# Patient Record
Sex: FEMALE | Race: WHITE | NOT HISPANIC OR LATINO | Employment: OTHER | URBAN - METROPOLITAN AREA
[De-identification: names, ages, dates, MRNs, and addresses within clinical notes are randomized per-mention and may not be internally consistent; named-entity substitution may affect disease eponyms.]

---

## 2017-05-02 ENCOUNTER — ALLSCRIPTS OFFICE VISIT (OUTPATIENT)
Dept: OTHER | Facility: OTHER | Age: 67
End: 2017-05-02

## 2017-05-02 RX ORDER — DILTIAZEM HYDROCHLORIDE 240 MG/1
240 CAPSULE, EXTENDED RELEASE ORAL DAILY
COMMUNITY

## 2017-05-02 RX ORDER — NABUMETONE 750 MG/1
750 TABLET, FILM COATED ORAL 2 TIMES DAILY
COMMUNITY

## 2017-05-02 RX ORDER — SIMVASTATIN 20 MG
20 TABLET ORAL
COMMUNITY

## 2017-05-02 RX ORDER — ASPIRIN 81 MG/1
81 TABLET ORAL DAILY
COMMUNITY

## 2017-05-02 RX ORDER — DONEPEZIL HYDROCHLORIDE 10 MG/1
10 TABLET, FILM COATED ORAL
COMMUNITY

## 2017-05-02 RX ORDER — CHOLECALCIFEROL (VITAMIN D3) 1250 MCG
CAPSULE ORAL
COMMUNITY

## 2017-05-02 RX ORDER — LEVOTHYROXINE SODIUM 137 UG/1
CAPSULE ORAL
COMMUNITY

## 2017-05-02 RX ORDER — DIPHENOXYLATE HYDROCHLORIDE AND ATROPINE SULFATE 2.5; .025 MG/1; MG/1
1 TABLET ORAL DAILY
COMMUNITY

## 2017-05-02 RX ORDER — DULOXETIN HYDROCHLORIDE 60 MG/1
60 CAPSULE, DELAYED RELEASE ORAL DAILY
COMMUNITY

## 2017-05-02 RX ORDER — TAMOXIFEN CITRATE 20 MG/1
20 TABLET ORAL 2 TIMES DAILY
COMMUNITY

## 2017-05-02 RX ORDER — MEMANTINE HYDROCHLORIDE 10 MG/1
10 TABLET ORAL 2 TIMES DAILY
COMMUNITY

## 2017-05-10 ENCOUNTER — ANESTHESIA EVENT (OUTPATIENT)
Dept: PERIOP | Facility: HOSPITAL | Age: 67
End: 2017-05-10
Payer: MEDICARE

## 2017-05-10 ENCOUNTER — ANESTHESIA (OUTPATIENT)
Dept: PERIOP | Facility: HOSPITAL | Age: 67
End: 2017-05-10
Payer: MEDICARE

## 2017-05-10 ENCOUNTER — HOSPITAL ENCOUNTER (OUTPATIENT)
Facility: HOSPITAL | Age: 67
Setting detail: OUTPATIENT SURGERY
Discharge: HOME | End: 2017-05-10
Attending: INTERNAL MEDICINE | Admitting: INTERNAL MEDICINE
Payer: MEDICARE

## 2017-05-10 ENCOUNTER — GENERIC CONVERSION - ENCOUNTER (OUTPATIENT)
Dept: OTHER | Facility: OTHER | Age: 67
End: 2017-05-10

## 2017-05-10 VITALS
WEIGHT: 192.02 LBS | HEART RATE: 73 BPM | OXYGEN SATURATION: 98 % | TEMPERATURE: 97.2 F | DIASTOLIC BLOOD PRESSURE: 72 MMHG | HEIGHT: 66 IN | BODY MASS INDEX: 30.86 KG/M2 | SYSTOLIC BLOOD PRESSURE: 131 MMHG | RESPIRATION RATE: 16 BRPM

## 2017-05-10 DIAGNOSIS — R07.89 ATYPICAL CHEST PAIN: ICD-10-CM

## 2017-05-10 PROCEDURE — 88305 TISSUE EXAM BY PATHOLOGIST: CPT | Performed by: INTERNAL MEDICINE

## 2017-05-10 PROCEDURE — 88342 IMHCHEM/IMCYTCHM 1ST ANTB: CPT | Performed by: INTERNAL MEDICINE

## 2017-05-10 RX ORDER — PROPOFOL 10 MG/ML
INJECTION, EMULSION INTRAVENOUS AS NEEDED
Status: DISCONTINUED | OUTPATIENT
Start: 2017-05-10 | End: 2017-05-10 | Stop reason: SURG

## 2017-05-10 RX ORDER — SODIUM CHLORIDE, SODIUM LACTATE, POTASSIUM CHLORIDE, CALCIUM CHLORIDE 600; 310; 30; 20 MG/100ML; MG/100ML; MG/100ML; MG/100ML
125 INJECTION, SOLUTION INTRAVENOUS CONTINUOUS
Status: DISCONTINUED | OUTPATIENT
Start: 2017-05-10 | End: 2017-05-10 | Stop reason: HOSPADM

## 2017-05-10 RX ORDER — LIDOCAINE HYDROCHLORIDE 20 MG/ML
INJECTION, SOLUTION EPIDURAL; INFILTRATION; INTRACAUDAL; PERINEURAL AS NEEDED
Status: DISCONTINUED | OUTPATIENT
Start: 2017-05-10 | End: 2017-05-10 | Stop reason: SURG

## 2017-05-10 RX ADMIN — LIDOCAINE HYDROCHLORIDE 40 MG: 20 INJECTION, SOLUTION EPIDURAL; INFILTRATION; INTRACAUDAL; PERINEURAL at 12:37

## 2017-05-10 RX ADMIN — SODIUM CHLORIDE, SODIUM LACTATE, POTASSIUM CHLORIDE, AND CALCIUM CHLORIDE: .6; .31; .03; .02 INJECTION, SOLUTION INTRAVENOUS at 12:16

## 2017-05-10 RX ADMIN — PROPOFOL 80 MG: 10 INJECTION, EMULSION INTRAVENOUS at 12:37

## 2017-05-15 ENCOUNTER — GENERIC CONVERSION - ENCOUNTER (OUTPATIENT)
Dept: OTHER | Facility: OTHER | Age: 67
End: 2017-05-15

## 2018-01-13 VITALS
WEIGHT: 188.13 LBS | SYSTOLIC BLOOD PRESSURE: 140 MMHG | DIASTOLIC BLOOD PRESSURE: 80 MMHG | HEIGHT: 66 IN | BODY MASS INDEX: 30.23 KG/M2 | HEART RATE: 76 BPM

## 2018-01-18 NOTE — RESULT NOTES
Verified Results  (1) TISSUE EXAM [id number] 12:40PM Abel Para     Test Name Result Flag Reference   LAB AP CASE REPORT (Report)     Surgical Pathology Report             Case: [id number]                   Authorizing Provider: Bryanna Quach MD  Collected:      05/10/2017 1240        Ordering Location:   [facility] Received:      05/10/2017 1314                    Operating Room                                 Pathologist:      Avery Medeiros MD                                Specimens:  A) - Duodenum, Duodenum, r/o celiac                                  B) - Stomach, Stomach, r/o H pylori                                  C) - Esophagus, Esophagus, r/o Carey's   LAB AP FINAL DIAGNOSIS (Report)     A  Duodenum, Duodenum, r/o celiac biopsy:   -Benign small intestinal mucosa with intact villi and no histopathological   changes   -No evidence of dysplasia or malignancy  B  Stomach, Stomach, r/o H pylori biopsy:  -Benign gastric- type mucosa with mild chronic inactive gastritis   -No evidence of Paneth cell metaplasia or atrophic gastritis   -No evidence of dysplasia or malignancy   -No H Pylori organisms identified on immunohistochemical stained slide  Control reacted appropriately  C  Esophagus, Esophagus, r/o Carey's biopsy:  -Benign gastric-cardiac type mucosa with mild chronic inactive gastritis   -No evidence of Goblet cell metaplasia/ Carey???s esophagus identified   -Squamous epithelium is not identified   -No evidence of dysplasia or malignancy seen  Electronically signed by Avery Medeiros MD on 5/12/2017 at 6:01 PM   LAB AP NOTE      Interpretation performed at [facility], [facility]   LAB AP SURGICAL ADDITIONAL INFORMATION (Report)     These tests were developed and their performance characteristics   determined by [facility] or [facility]  They may not be cleared or approved by the [place]   Food and   Drug Administration  The FDA has determined that such clearance or   approval is not necessary  These tests are used for clinical purposes  They should not be regarded as investigational or for research  This   laboratory has been approved by CLIA 88, designated as a high-complexity   laboratory and is qualified to perform these tests  LAB AP GROSS DESCRIPTION (Report)     A  The specimen is received in formalin, labeled with the patient's   name and hospital number, and is designated duodenum  The specimen   consists of two, rubbery, tan-brown tissue fragments each measuring up to   0 2 cm in greatest dimension  Entirely submitted  One cassette  B  The specimen is received in formalin, labeled with the patient's name   and hospital number, and is designated  stomach consists of 3 rubbery   tan-brown tissue fragments measuring from 0 2 up to 0 3 cm in greatest   dimension  Entirely submitted  One cassette  C  The specimen is received in formalin, labeled with the patient's name   and hospital number, and is designated esophagus  The specimen consists   of a single, rubbery, tan-brown tissue fragment measuring up to 0 2 cm in   greatest dimension  Entirely submitted  One cassette  Note: The estimated total formalin fixation time based upon information   provided by the submitting clinician and the standard processing schedule   is 25 25 hours      SRS   LAB AP CLINICAL INFORMATION Cold bx   R/o celiac     Cold bx   R/o celiac

## 2018-01-29 ENCOUNTER — APPOINTMENT (EMERGENCY)
Dept: RADIOLOGY | Facility: HOSPITAL | Age: 68
End: 2018-01-29
Payer: MEDICARE

## 2018-01-29 ENCOUNTER — HOSPITAL ENCOUNTER (EMERGENCY)
Facility: HOSPITAL | Age: 68
Discharge: HOME | End: 2018-01-29
Attending: EMERGENCY MEDICINE | Admitting: EMERGENCY MEDICINE
Payer: MEDICARE

## 2018-01-29 VITALS
DIASTOLIC BLOOD PRESSURE: 79 MMHG | HEIGHT: 66 IN | HEART RATE: 67 BPM | OXYGEN SATURATION: 99 % | RESPIRATION RATE: 18 BRPM | SYSTOLIC BLOOD PRESSURE: 165 MMHG | TEMPERATURE: 98.1 F

## 2018-01-29 DIAGNOSIS — S76.012A STRAIN OF FLEXOR MUSCLE OF HIP, LEFT, INITIAL ENCOUNTER: ICD-10-CM

## 2018-01-29 DIAGNOSIS — S76.212A STRAIN OF GROIN, LEFT, INITIAL ENCOUNTER: Primary | ICD-10-CM

## 2018-01-29 PROCEDURE — 73502 X-RAY EXAM HIP UNI 2-3 VIEWS: CPT

## 2018-01-29 PROCEDURE — 99283 EMERGENCY DEPT VISIT LOW MDM: CPT

## 2018-01-29 RX ORDER — CYCLOBENZAPRINE HCL 10 MG
10 TABLET ORAL 2 TIMES DAILY PRN
Qty: 20 TABLET | Refills: 0 | Status: SHIPPED | OUTPATIENT
Start: 2018-01-29 | End: 2021-01-20 | Stop reason: HOSPADM

## 2018-01-29 RX ORDER — CYCLOBENZAPRINE HCL 10 MG
10 TABLET ORAL ONCE
Status: COMPLETED | OUTPATIENT
Start: 2018-01-29 | End: 2018-01-29

## 2018-01-29 RX ORDER — IBUPROFEN 400 MG/1
400 TABLET ORAL ONCE
Status: COMPLETED | OUTPATIENT
Start: 2018-01-29 | End: 2018-01-29

## 2018-01-29 RX ADMIN — IBUPROFEN 400 MG: 400 TABLET, FILM COATED ORAL at 15:15

## 2018-01-29 RX ADMIN — CYCLOBENZAPRINE HYDROCHLORIDE 10 MG: 10 TABLET, FILM COATED ORAL at 15:15

## 2018-01-29 NOTE — DISCHARGE INSTRUCTIONS
Groin Strain   WHAT YOU NEED TO KNOW:   A groin strain occurs when a muscle or tendon is stretched or torn  The groin is the area where your abdomen meets your upper leg  Tendons are cords of tissue that attach muscle to bone  DISCHARGE INSTRUCTIONS:   Rest your groin area: You will need to rest your groin area from activities that may cause you pain  This will help decrease the risk of more damage to your groin  Use crutches or a cane as directed  Ice your groin area: Ice your groin area to help decrease swelling and pain  Put crushed ice in a plastic bag and cover it with a towel  Put the ice on your groin area for 15 to 20 minutes every hour  Do this for as many days as directed  Wrap your groin:  Your healthcare provider will instruct you on how to wrap your groin with an elastic bandage or tape  When you wrap your groin, it becomes more stable  Wrapping your groin can help decrease your pain  Elevate the injured area:  Keep the leg on your injured side raised to help decrease pain and swelling in your groin area  Use pillows, blankets, or rolled towels to elevate your leg as often as you can  Medicine:   · Pain medicine: You may be given medicine such as aspirin or ibuprofen to decrease or take away pain  Do not wait until the pain is severe before you take your medicine  · Take your medicine as directed:  Call your healthcare provider if you think your medicine is not helping or if you have side effects  Tell him if you take any vitamins, herbs, or other medicines  Keep a list of the medicines you take  Include the amounts, and when and why you take them  Bring the list or the pill bottles to follow-up visits  Activity:  You may need to exercise the injured area after your pain and swelling have decreased  Exercises will help prevent stiffness in the injured area and increase strength  Return to your normal activities slowly   You could injure yourself again if you try to return to normal activity too soon  Return to your normal level of activity when:  · You do not have pain when you walk, run, or jump  · Your injured leg moves like your uninjured leg  · Your injured leg feels as strong as your uninjured leg  Prevent another injury:   · Warm up and stretch before you exercise  · Wear shoes that fit well  · Wear equipment to protect yourself when you play sports  · Do exercises as directed to build muscle strength  Stop if you feel pain or tightness in your groin  Follow up with your healthcare provider as directed:  Write down any questions you have so you remember to ask them in your follow-up visits  Contact your healthcare provider if:   · You have increased swelling and pain in your injured area  · You have increased groin pain when standing or with movement  · You have questions or concerns about your injury or treatment  © 2017 [address]  Information is for End User's use only and may not be sold, redistributed or otherwise used for commercial purposes  All illustrations and images included in CareNotes® are the copyrighted property of A [employer] M [employer]  or [employer]ft[employer] Pick[employer]  The above information is an  only  It is not intended as medical advice for individual conditions or treatments  Talk to your doctor, nurse or pharmacist before following any medical regimen to see if it is safe and effective for you  Hip Sprain, Ambulatory Care   GENERAL INFORMATION:   A hip sprain  is when a ligament in your hip is stretched or torn  Ligaments (tough tissue that connects bones) surround your hip and hold it in place    Seek immediate care for the following symptoms:   · Trouble standing on the leg on your injured side    · New or increased stiffness or trouble moving your injured hip    · New or increased numbness in the leg on your injured side    · Increased swelling and pain in your hip    · Bluish or pale skin on the leg on your injured side  Treatment for a hip sprain  may include any of the following:  · NSAIDs  help decrease swelling and pain or fever  This medicine is available with or without a doctor's order  NSAIDs can cause stomach bleeding or kidney problems in certain people  If you take blood thinner medicine, always ask your healthcare provider if NSAIDs are safe for you  Always read the medicine label and follow directions  · Hip exercises  help decrease stiffness  Your healthcare provider may want you to start hip exercises after you rest your hip for about 3 days  He may also have you start physical therapy  A physical therapist teaches you light exercises to help decrease pain and swelling and improve hip movement  Once you are able to move your hip without pain, you will be taught exercises to improve your strength  If you have a severe sprain, you may need to wait 1 to 3 weeks before you exercise your hip  Manage your hip sprain:   · Rest your hip for 2 to 3 days after your injury  This will help decrease the risk of more damage to your hip  · Apply ice on your hip for 15 to 20 minutes every hour or as directed  Use an ice pack, or put crushed ice in a plastic bag  Cover it with a towel  Ice helps prevent tissue damage and decreases swelling and pain  You may need to apply ice to your hip at least 4 to 8 times each day  Prevent another hip sprain:   · Do not exercise when you feel pain or you are tired  · Exercise daily  Make sure you warm up and stretch before you exercise  · Wear equipment to protect yourself when you play sports  · Wear shoes that fit well to decrease your risk for falls  · Stop exercising and playing sports if your symptoms from a past injury return  Follow up with your healthcare provider as directed:  Write down your questions so you remember to ask them during your visits  CARE AGREEMENT:   You have the right to help plan your care   Learn about your health condition and how it may be treated  Discuss treatment options with your caregivers to decide what care you want to receive  You always have the right to refuse treatment  The above information is an  only  It is not intended as medical advice for individual conditions or treatments  Talk to your doctor, nurse or pharmacist before following any medical regimen to see if it is safe and effective for you  © 2014 [address] is for End User's use only and may not be sold, redistributed or otherwise used for commercial purposes  All illustrations and images included in CareNotes® are the copyrighted property of A D A M , [employer]  or [employer]ftali Pickens

## 2018-01-29 NOTE — ED PROVIDER NOTES
History  Chief Complaint   Patient presents with    Hip Pain     Pt presents to ED due to left hip pain after twisting LLE while making the bed this AM  Hx of hip pain  +2 pedal pulse  History provided by:  Patient   used: No    Hip Pain   Associated symptoms: no rash and no shortness of breath     41-year-old female presented for evaluation of left hip and groin pain which came on this morning after she had twisted her body and leg while making the bed  She had previously injured the same area about a month ago and was still in the process of improving when she re-injured herself today  Took Tylenol prior to arrival with some improvement  Tenderness of the left low back/hip as well as the left groin  Pain exacerbated with resistance against flexion of the hip  Normal strength, sensation  No difficulty urinating or with bowel movements  Plan conservative treatment for groin strain/hip strain  Given info for exercises  Follow up with PCP if symptoms are not improving  Prior to Admission Medications   Prescriptions Last Dose Informant Patient Reported? Taking?    Cholecalciferol (VITAMIN D3) 89421 units CAPS   Yes No   Sig: Take by mouth   DULoxetine (CYMBALTA) 60 mg delayed release capsule   Yes No   Sig: Take 60 mg by mouth daily   Levothyroxine Sodium 137 MCG CAPS   Yes No   Sig: Take by mouth   aspirin (ECOTRIN LOW STRENGTH) 81 mg EC tablet   Yes No   Sig: Take 81 mg by mouth daily   diltiazem (DILACOR XR) 240 MG 24 hr capsule   Yes No   Sig: Take 240 mg by mouth daily   donepezil (ARICEPT) 10 mg tablet   Yes No   Sig: Take 10 mg by mouth daily at bedtime   losartan 50 mg TABS 100 mg, hydrochlorothiazide 12 5 mg TABS 12 5 mg   Yes No   Sig: Take by mouth daily   memantine (NAMENDA) 10 mg tablet   Yes No   Sig: Take 10 mg by mouth 2 (two) times a day   multivitamin (THERAGRAN) TABS   Yes No   Sig: Take 1 tablet by mouth daily   nabumetone (RELAFEN) 750 mg tablet   Yes No Sig: Take 750 mg by mouth 2 (two) times a day   simvastatin (ZOCOR) 20 mg tablet   Yes No   Sig: Take 20 mg by mouth daily at bedtime   tamoxifen (NOLVADEX) 20 mg tablet   Yes No   Sig: Take 20 mg by mouth 2 (two) times a day      Facility-Administered Medications: None       Past Medical History:   Diagnosis Date    GERD (gastroesophageal reflux disease)     Hyperlipidemia     Hypertension     Hypothyroidism     Memory difficulties     Shortness of breath        Past Surgical History:   Procedure Laterality Date    APPENDECTOMY      BREAST SURGERY       SECTION      HYSTERECTOMY      [place] ESOPHAGOGASTRODUODENOSCOPY TRANSORAL DIAGNOSTIC N/A 5/10/2017    Procedure: ESOPHAGOGASTRODUODENOSCOPY (EGD); Surgeon: Jany Buck MD;  Location: [facility]; Service: Gastroenterology    TONSILLECTOMY         History reviewed  No pertinent family history  I have reviewed and agree with the history as documented  Social History   Substance Use Topics    Smoking status: Never Smoker    Smokeless tobacco: Never Used    Alcohol use No        Review of Systems   Constitutional: Negative for activity change and appetite change  Respiratory: Negative for chest tightness and shortness of breath  Musculoskeletal: Positive for gait problem  Negative for back pain and neck pain  Skin: Negative for color change and rash  Neurological: Negative for dizziness and weakness  All other systems reviewed and are negative        Physical Exam  ED Triage Vitals [18 1306]   Temperature Pulse Respirations Blood Pressure SpO2   98 1 °F (36 7 °C) 67 18 165/79 99 %      Temp Source Heart Rate Source Patient Position - Orthostatic VS BP Location FiO2 (%)   Oral Monitor Sitting Left arm --      Pain Score       9           Orthostatic Vital Signs  Vitals:    18 1306   BP: 165/79   Pulse: 67   Patient Position - Orthostatic VS: Sitting       Physical Exam   Constitutional: She appears well-developed and well-nourished  No distress  HENT:   Head: Normocephalic and atraumatic  Cardiovascular: Normal rate, regular rhythm and intact distal pulses  Musculoskeletal: Normal range of motion  Pain with active flexion of the left hip as well as some rotation  Tenderness along the inguinal region and around the left hip to the sacroiliac region  Neurological: She exhibits normal muscle tone  Coordination normal    Normal strength, sensation  Skin: Skin is warm and dry  Psychiatric: She has a normal mood and affect  Her behavior is normal    Nursing note and vitals reviewed  ED Medications  Medications   ibuprofen (MOTRIN) tablet 400 mg (not administered)   cyclobenzaprine (FLEXERIL) tablet 10 mg (not administered)       Diagnostic Studies  Results Reviewed     None                 XR hip/pelv 2-3 vws left if performed   Final Result by Ada Velasco MD (01/29 1343)      No acute osseous abnormality  Workstation performed: [id number]                    Procedures  Procedures       Phone Contacts  ED Phone Contact    ED Course  ED Course                                MDM  Number of Diagnoses or Management Options  Strain of flexor muscle of hip, left, initial encounter:   Strain of groin, left, initial encounter:   Diagnosis management comments: 79-year-old female with the left hip/groin strain after twisting while making her bed this morning  X-ray done prior to my evaluation was unremarkable  Given ibuprofen, Flexeril in the emergency department  Can continue symptomatic care at home followed by physical therapy/exercises as tolerated         Amount and/or Complexity of Data Reviewed  Tests in the radiology section of CPT®: reviewed      CritCare Time    Disposition  Final diagnoses:   Strain of groin, left, initial encounter   Strain of flexor muscle of hip, left, initial encounter     Time reflects when diagnosis was documented in both MDM as applicable and the Disposition within this note     Time User Action Codes Description Comment    1/29/2018  2:47 PM Renetta November Add [[id number] [id number]] Strain of groin, left, initial encounter     1/29/2018  2:47 [facility]rain of flexor muscle of hip, left, initial encounter       ED Disposition     ED Disposition Condition Comment    Discharge  Codi Bello discharge to home/self care  Condition at discharge: Stable        Follow-up Information     Follow up With Specialties Details Why Contact Info    Ifeanyi Peng MD Internal Medicine   [address] [place] [address]  [phone]          Patient's Medications   Discharge Prescriptions    CYCLOBENZAPRINE (FLEXERIL) 10 MG TABLET    Take 1 tablet (10 mg total) by mouth 2 (two) times a day as needed for muscle spasms (Don't take with tramadol)       Start Date: 1/29/2018 End Date: --       Order Dose: 10 mg       Quantity: 20 tablet    Refills: 0     No discharge procedures on file      ED Provider  Electronically Signed by           Margarita Gonzalez MD  01/29/18 6896

## 2018-09-15 ENCOUNTER — APPOINTMENT (OUTPATIENT)
Dept: RADIOLOGY | Facility: MEDICAL CENTER | Age: 68
End: 2018-09-15
Payer: MEDICARE

## 2018-09-15 ENCOUNTER — OFFICE VISIT (OUTPATIENT)
Dept: URGENT CARE | Facility: MEDICAL CENTER | Age: 68
End: 2018-09-15
Payer: MEDICARE

## 2018-09-15 VITALS
OXYGEN SATURATION: 98 % | RESPIRATION RATE: 18 BRPM | HEART RATE: 66 BPM | TEMPERATURE: 99 F | HEIGHT: 66 IN | BODY MASS INDEX: 28.12 KG/M2 | DIASTOLIC BLOOD PRESSURE: 70 MMHG | WEIGHT: 175 LBS | SYSTOLIC BLOOD PRESSURE: 136 MMHG

## 2018-09-15 DIAGNOSIS — M79.671 RIGHT FOOT PAIN: Primary | ICD-10-CM

## 2018-09-15 DIAGNOSIS — M79.671 RIGHT FOOT PAIN: ICD-10-CM

## 2018-09-15 PROCEDURE — G0463 HOSPITAL OUTPT CLINIC VISIT: HCPCS | Performed by: PHYSICIAN ASSISTANT

## 2018-09-15 PROCEDURE — 99213 OFFICE O/P EST LOW 20 MIN: CPT | Performed by: PHYSICIAN ASSISTANT

## 2018-09-15 PROCEDURE — 73630 X-RAY EXAM OF FOOT: CPT

## 2018-09-15 RX ORDER — AMOXICILLIN AND CLAVULANATE POTASSIUM 875; 125 MG/1; MG/1
1 TABLET, FILM COATED ORAL EVERY 12 HOURS SCHEDULED
COMMUNITY
End: 2021-01-20 | Stop reason: HOSPADM

## 2018-09-15 NOTE — PATIENT INSTRUCTIONS
Ace bandage as directed  Ice as directed  Take motrin as directed  Follow up with PCP in 1-2 days  Go to the ER for worsening symptoms

## 2018-09-15 NOTE — PROGRESS NOTES
[facility]        NAME: Johnathon Gomes is a 70 y o  female  : 1950    MRN: [id number]      Assessment and Plan   Right foot pain [M79 671]  1  Right foot pain  XR foot 3+ vw right         Patient Instructions     Patient Instructions   Ace bandage as directed  Ice as directed  Follow up with PCP in 1-2 days  Go to the ER for worsening symptoms  Chief Complaint     Chief Complaint   Patient presents with    Foot Pain         History of Present Illness       This is a 58-year-old female complaining of right foot pain x2 days  Patient denies any injury, trauma, accident or specific event when for injuring her foot  Patient reports pain with ambulation  Patient reports she is unable to bear weight  Patient use a walker to get out to the car today  Patient denies this ever happening in the past   Patient denies history of gout  Patient denies any fever chills, nausea vomiting, diarrhea, constipation  Patient reports swelling and mild redness to the dorsal aspect of foot  Pain is located over the 3rd 4th and 5th metatarsals  Review of Systems   Review of Systems   Constitutional: Negative for chills and fever  HENT: Negative  Eyes: Negative  Respiratory: Negative for chest tightness, shortness of breath and wheezing  Cardiovascular: Negative for chest pain and palpitations  Musculoskeletal: Positive for arthralgias  Skin: Negative for rash           Current Medications       Current Outpatient Prescriptions:     amoxicillin-clavulanate (AUGMENTIN) 875-125 mg per tablet, Take 1 tablet by mouth every 12 (twelve) hours, Disp: , Rfl:     aspirin (ECOTRIN LOW STRENGTH) 81 mg EC tablet, Take 81 mg by mouth daily, Disp: , Rfl:     diltiazem (DILACOR XR) 240 MG 24 hr capsule, Take 240 mg by mouth daily, Disp: , Rfl:     donepezil (ARICEPT) 10 mg tablet, Take 10 mg by mouth daily at bedtime, Disp: , Rfl:     Levothyroxine Sodium 137 MCG CAPS, Take by mouth, Disp: , Rfl:     losartan 50 mg TABS 100 mg, hydrochlorothiazide 12 5 mg TABS 12 5 mg, Take by mouth daily, Disp: , Rfl:     memantine (NAMENDA) 10 mg tablet, Take 10 mg by mouth 2 (two) times a day, Disp: , Rfl:     multivitamin (THERAGRAN) TABS, Take 1 tablet by mouth daily, Disp: , Rfl:     nabumetone (RELAFEN) 750 mg tablet, Take 750 mg by mouth 2 (two) times a day, Disp: , Rfl:     simvastatin (ZOCOR) 20 mg tablet, Take 20 mg by mouth daily at bedtime, Disp: , Rfl:     tamoxifen (NOLVADEX) 20 mg tablet, Take 20 mg by mouth 2 (two) times a day, Disp: , Rfl:     Cholecalciferol (VITAMIN D3) 28255 units CAPS, Take by mouth, Disp: , Rfl:     cyclobenzaprine (FLEXERIL) 10 mg tablet, Take 1 tablet (10 mg total) by mouth 2 (two) times a day as needed for muscle spasms (Don't take with tramadol) (Patient not taking: Reported on 9/15/2018 ), Disp: 20 tablet, Rfl: 0    DULoxetine (CYMBALTA) 60 mg delayed release capsule, Take 60 mg by mouth daily, Disp: , Rfl:     Current Allergies     Allergies as of 09/15/2018 - Reviewed 09/15/2018   Allergen Reaction Noted    Erythromycin GI Intolerance 2017            The following portions of the patient's history were reviewed and updated as appropriate: allergies, current medications, past family history, past medical history, past social history, past surgical history and problem list      Past Medical History:   Diagnosis Date    GERD (gastroesophageal reflux disease)     Hyperlipidemia     Hypertension     Hypothyroidism     Memory difficulties     Shortness of breath        Past Surgical History:   Procedure Laterality Date    APPENDECTOMY      BREAST SURGERY       SECTION      HYSTERECTOMY      [place] ESOPHAGOGASTRODUODENOSCOPY TRANSORAL DIAGNOSTIC N/A 5/10/2017    Procedure: ESOPHAGOGASTRODUODENOSCOPY (EGD); Surgeon: Amber Parr MD;  Location: [facility];   Service: Gastroenterology    TONSILLECTOMY         No family history on file       Medications have been verified  Objective   /70   Pulse 66   Temp 99 °F (37 2 °C) (Temporal)   Resp 18   Ht 5' 6" (1 676 m)   Wt 79 4 kg (175 lb)   SpO2 98%   BMI 28 25 kg/m²     X-RAY  Right foot  I personally reviewed X-ray  No acute osseous abnormalities  Physical Exam     Physical Exam   Constitutional: She is oriented to person, place, and time  She appears well-developed and well-nourished  No distress  Cardiovascular: Normal rate, regular rhythm and normal heart sounds  Pulmonary/Chest: Effort normal and breath sounds normal  No respiratory distress  Musculoskeletal: She exhibits tenderness  Feet:    Neurological: She is alert and oriented to person, place, and time  Skin: No rash noted  Nursing note and vitals reviewed

## 2018-09-15 NOTE — PROGRESS NOTES
Pt  Began with foot pain about two days ago  Denies trauma to the area  Her right foot is swollen and painful  The top her foot is red and warm

## 2019-09-23 ENCOUNTER — TRANSCRIBE ORDERS (OUTPATIENT)
Dept: ADMINISTRATIVE | Facility: HOSPITAL | Age: 69
End: 2019-09-23

## 2019-09-23 DIAGNOSIS — F02.80 ALZHEIMER'S DEMENTIA WITHOUT BEHAVIORAL DISTURBANCE, UNSPECIFIED TIMING OF DEMENTIA ONSET (HCC): Primary | ICD-10-CM

## 2019-09-23 DIAGNOSIS — G30.9 ALZHEIMER'S DEMENTIA WITHOUT BEHAVIORAL DISTURBANCE, UNSPECIFIED TIMING OF DEMENTIA ONSET (HCC): Primary | ICD-10-CM

## 2019-09-27 ENCOUNTER — TRANSCRIBE ORDERS (OUTPATIENT)
Dept: ADMINISTRATIVE | Facility: HOSPITAL | Age: 69
End: 2019-09-27

## 2019-09-27 ENCOUNTER — APPOINTMENT (OUTPATIENT)
Dept: RADIOLOGY | Facility: MEDICAL CENTER | Age: 69
End: 2019-09-27
Payer: MEDICARE

## 2019-09-27 DIAGNOSIS — R05.9 COUGH: ICD-10-CM

## 2019-09-27 DIAGNOSIS — R05.9 COUGH: Primary | ICD-10-CM

## 2019-09-27 PROCEDURE — 71046 X-RAY EXAM CHEST 2 VIEWS: CPT

## 2019-09-30 ENCOUNTER — HOSPITAL ENCOUNTER (EMERGENCY)
Facility: HOSPITAL | Age: 69
Discharge: HOME | End: 2019-09-30
Attending: EMERGENCY MEDICINE
Payer: MEDICARE

## 2019-09-30 ENCOUNTER — APPOINTMENT (EMERGENCY)
Dept: RADIOLOGY | Facility: HOSPITAL | Age: 69
End: 2019-09-30
Payer: MEDICARE

## 2019-09-30 ENCOUNTER — APPOINTMENT (EMERGENCY)
Dept: CT IMAGING | Facility: HOSPITAL | Age: 69
End: 2019-09-30
Payer: MEDICARE

## 2019-09-30 VITALS
SYSTOLIC BLOOD PRESSURE: 147 MMHG | OXYGEN SATURATION: 94 % | TEMPERATURE: 98.1 F | HEART RATE: 88 BPM | BODY MASS INDEX: 29.82 KG/M2 | RESPIRATION RATE: 18 BRPM | WEIGHT: 184.75 LBS | DIASTOLIC BLOOD PRESSURE: 66 MMHG

## 2019-09-30 DIAGNOSIS — R79.89 LOW TSH LEVEL: ICD-10-CM

## 2019-09-30 DIAGNOSIS — R42 DIZZINESS: Primary | ICD-10-CM

## 2019-09-30 LAB
ALBUMIN SERPL BCP-MCNC: 3.2 G/DL (ref 3.5–5)
ALP SERPL-CCNC: 51 U/L (ref 46–116)
ALT SERPL W P-5'-P-CCNC: 27 U/L (ref 12–78)
ANION GAP SERPL CALCULATED.3IONS-SCNC: 8 MMOL/L (ref 4–13)
APTT PPP: 27 SECONDS (ref 23–37)
AST SERPL W P-5'-P-CCNC: 36 U/L (ref 5–45)
BACTERIA UR QL AUTO: ABNORMAL /HPF
BASOPHILS # BLD AUTO: 0.03 THOUSANDS/ΜL (ref 0–0.1)
BASOPHILS NFR BLD AUTO: 1 % (ref 0–1)
BILIRUB SERPL-MCNC: 0.9 MG/DL (ref 0.2–1)
BILIRUB UR QL STRIP: NEGATIVE
BUN SERPL-MCNC: 18 MG/DL (ref 5–25)
CALCIUM SERPL-MCNC: 8.5 MG/DL (ref 8.3–10.1)
CHLORIDE SERPL-SCNC: 105 MMOL/L (ref 100–108)
CLARITY UR: CLEAR
CO2 SERPL-SCNC: 26 MMOL/L (ref 21–32)
COLOR UR: YELLOW
CREAT SERPL-MCNC: 1.09 MG/DL (ref 0.6–1.3)
EOSINOPHIL # BLD AUTO: 0.13 THOUSAND/ΜL (ref 0–0.61)
EOSINOPHIL NFR BLD AUTO: 2 % (ref 0–6)
ERYTHROCYTE [DISTWIDTH] IN BLOOD BY AUTOMATED COUNT: 12.6 % (ref 11.6–15.1)
GFR SERPL CREATININE-BSD FRML MDRD: 52 ML/MIN/1.73SQ M
GLUCOSE SERPL-MCNC: 110 MG/DL (ref 65–140)
GLUCOSE UR STRIP-MCNC: NEGATIVE MG/DL
HCT VFR BLD AUTO: 37 % (ref 34.8–46.1)
HGB BLD-MCNC: 12.4 G/DL (ref 11.5–15.4)
HGB UR QL STRIP.AUTO: NEGATIVE
IMM GRANULOCYTES # BLD AUTO: 0.01 THOUSAND/UL (ref 0–0.2)
IMM GRANULOCYTES NFR BLD AUTO: 0 % (ref 0–2)
INR PPP: 1.1 (ref 0.84–1.19)
KETONES UR STRIP-MCNC: ABNORMAL MG/DL
LEUKOCYTE ESTERASE UR QL STRIP: ABNORMAL
LYMPHOCYTES # BLD AUTO: 2.48 THOUSANDS/ΜL (ref 0.6–4.47)
LYMPHOCYTES NFR BLD AUTO: 46 % (ref 14–44)
MAGNESIUM SERPL-MCNC: 2 MG/DL (ref 1.6–2.6)
MCH RBC QN AUTO: 33.7 PG (ref 26.8–34.3)
MCHC RBC AUTO-ENTMCNC: 33.5 G/DL (ref 31.4–37.4)
MCV RBC AUTO: 101 FL (ref 82–98)
MONOCYTES # BLD AUTO: 0.66 THOUSAND/ΜL (ref 0.17–1.22)
MONOCYTES NFR BLD AUTO: 12 % (ref 4–12)
NEUTROPHILS # BLD AUTO: 2.1 THOUSANDS/ΜL (ref 1.85–7.62)
NEUTS SEG NFR BLD AUTO: 39 % (ref 43–75)
NITRITE UR QL STRIP: NEGATIVE
NON-SQ EPI CELLS URNS QL MICRO: ABNORMAL /HPF
NRBC BLD AUTO-RTO: 0 /100 WBCS
NT-PROBNP SERPL-MCNC: 491 PG/ML
OTHER STN SPEC: ABNORMAL
PH UR STRIP.AUTO: 5.5 [PH] (ref 4.5–8)
PLATELET # BLD AUTO: 181 THOUSANDS/UL (ref 149–390)
PMV BLD AUTO: 8.9 FL (ref 8.9–12.7)
POTASSIUM SERPL-SCNC: 3.8 MMOL/L (ref 3.5–5.3)
PROT SERPL-MCNC: 6.2 G/DL (ref 6.4–8.2)
PROT UR STRIP-MCNC: NEGATIVE MG/DL
PROTHROMBIN TIME: 13.6 SECONDS (ref 11.6–14.5)
RBC # BLD AUTO: 3.68 MILLION/UL (ref 3.81–5.12)
RBC #/AREA URNS AUTO: ABNORMAL /HPF
SODIUM SERPL-SCNC: 139 MMOL/L (ref 136–145)
SP GR UR STRIP.AUTO: 1.01 (ref 1–1.03)
T4 FREE SERPL-MCNC: 2.62 NG/DL (ref 0.76–1.46)
TROPONIN I SERPL-MCNC: <0.02 NG/ML
TSH SERPL DL<=0.05 MIU/L-ACNC: <0.007 UIU/ML (ref 0.36–3.74)
UROBILINOGEN UR QL STRIP.AUTO: 0.2 E.U./DL
WBC # BLD AUTO: 5.41 THOUSAND/UL (ref 4.31–10.16)
WBC #/AREA URNS AUTO: ABNORMAL /HPF

## 2019-09-30 PROCEDURE — 85730 THROMBOPLASTIN TIME PARTIAL: CPT | Performed by: EMERGENCY MEDICINE

## 2019-09-30 PROCEDURE — 83735 ASSAY OF MAGNESIUM: CPT | Performed by: EMERGENCY MEDICINE

## 2019-09-30 PROCEDURE — 84443 ASSAY THYROID STIM HORMONE: CPT | Performed by: EMERGENCY MEDICINE

## 2019-09-30 PROCEDURE — 93005 ELECTROCARDIOGRAM TRACING: CPT

## 2019-09-30 PROCEDURE — 83880 ASSAY OF NATRIURETIC PEPTIDE: CPT | Performed by: EMERGENCY MEDICINE

## 2019-09-30 PROCEDURE — 80053 COMPREHEN METABOLIC PANEL: CPT | Performed by: EMERGENCY MEDICINE

## 2019-09-30 PROCEDURE — 71046 X-RAY EXAM CHEST 2 VIEWS: CPT

## 2019-09-30 PROCEDURE — 84439 ASSAY OF FREE THYROXINE: CPT | Performed by: EMERGENCY MEDICINE

## 2019-09-30 PROCEDURE — 36415 COLL VENOUS BLD VENIPUNCTURE: CPT

## 2019-09-30 PROCEDURE — 81001 URINALYSIS AUTO W/SCOPE: CPT

## 2019-09-30 PROCEDURE — 85610 PROTHROMBIN TIME: CPT | Performed by: EMERGENCY MEDICINE

## 2019-09-30 PROCEDURE — 99284 EMERGENCY DEPT VISIT MOD MDM: CPT | Performed by: EMERGENCY MEDICINE

## 2019-09-30 PROCEDURE — 84484 ASSAY OF TROPONIN QUANT: CPT | Performed by: EMERGENCY MEDICINE

## 2019-09-30 PROCEDURE — 99284 EMERGENCY DEPT VISIT MOD MDM: CPT

## 2019-09-30 PROCEDURE — 70450 CT HEAD/BRAIN W/O DYE: CPT

## 2019-09-30 PROCEDURE — 85025 COMPLETE CBC W/AUTO DIFF WBC: CPT | Performed by: EMERGENCY MEDICINE

## 2019-09-30 RX ORDER — MECLIZINE HCL 12.5 MG/1
25 TABLET ORAL ONCE
Status: COMPLETED | OUTPATIENT
Start: 2019-09-30 | End: 2019-09-30

## 2019-09-30 RX ORDER — MECLIZINE HYDROCHLORIDE 25 MG/1
25 TABLET ORAL 3 TIMES DAILY PRN
Qty: 30 TABLET | Refills: 0 | Status: SHIPPED | OUTPATIENT
Start: 2019-09-30

## 2019-09-30 RX ADMIN — MECLIZINE 25 MG: 12.5 TABLET ORAL at 14:16

## 2019-09-30 NOTE — ED PROVIDER NOTES
History  Chief Complaint   Patient presents with    Dizziness     Pt reports dizziness and weakness starting this morning  Pt sent over by PCP for possible stroke  Pt does not show deficits at this time  53-year-old female comes in for evaluation of dizziness  Patient states approximately 2 hours ago he began having dizziness like the room is spinning now feels weak  States the dizziness is somewhat better but is not completely gone  Patient states the dizziness is worse when she turns her head from side to side  Patient denies any headache vomiting fever chills chest pain or shortness of breath  Patient denies any previous similar episodes  History provided by:  Parent and patient   used: No    Dizziness   Quality:  Head spinning and room spinning  Severity:  Moderate  Onset quality:  Sudden  Duration:  2 hours  Timing:  Constant  Progression:  Improving  Chronicity:  New  Context: head movement    Ineffective treatments:  None tried  Associated symptoms: weakness    Associated symptoms: no blood in stool, no chest pain, no diarrhea, no headaches, no palpitations, no shortness of breath and no vomiting    Risk factors: no anemia, no Meniere's disease and no new medications        Prior to Admission Medications   Prescriptions Last Dose Informant Patient Reported? Taking?    Cholecalciferol (VITAMIN D3) 38811 units CAPS   Yes No   Sig: Take by mouth   DULoxetine (CYMBALTA) 60 mg delayed release capsule   Yes No   Sig: Take 60 mg by mouth daily   Levothyroxine Sodium 137 MCG CAPS   Yes No   Sig: Take by mouth   amoxicillin-clavulanate (AUGMENTIN) 875-125 mg per tablet   Yes No   Sig: Take 1 tablet by mouth every 12 (twelve) hours   aspirin (ECOTRIN LOW STRENGTH) 81 mg EC tablet   Yes No   Sig: Take 81 mg by mouth daily   cyclobenzaprine (FLEXERIL) 10 mg tablet   No No   Sig: Take 1 tablet (10 mg total) by mouth 2 (two) times a day as needed for muscle spasms (Don't take with tramadol)   Patient not taking: Reported on 9/15/2018    diltiazem (DILACOR XR) 240 MG 24 hr capsule   Yes No   Sig: Take 240 mg by mouth daily   donepezil (ARICEPT) 10 mg tablet   Yes No   Sig: Take 10 mg by mouth daily at bedtime   losartan 50 mg TABS 100 mg, hydrochlorothiazide 12 5 mg TABS 12 5 mg   Yes No   Sig: Take by mouth daily   memantine (NAMENDA) 10 mg tablet   Yes No   Sig: Take 10 mg by mouth 2 (two) times a day   multivitamin (THERAGRAN) TABS   Yes No   Sig: Take 1 tablet by mouth daily   nabumetone (RELAFEN) 750 mg tablet   Yes No   Sig: Take 750 mg by mouth 2 (two) times a day   simvastatin (ZOCOR) 20 mg tablet   Yes No   Sig: Take 20 mg by mouth daily at bedtime   tamoxifen (NOLVADEX) 20 mg tablet   Yes No   Sig: Take 20 mg by mouth 2 (two) times a day      Facility-Administered Medications: None       Past Medical History:   Diagnosis Date    Cancer ([facility] [address] )     breast    GERD (gastroesophageal reflux disease)     Hyperlipidemia     Hypertension     Hypothyroidism     Memory difficulties     Shortness of breath        Past Surgical History:   Procedure Laterality Date    APPENDECTOMY      BREAST SURGERY       SECTION      HYSTERECTOMY      [place] ESOPHAGOGASTRODUODENOSCOPY TRANSORAL DIAGNOSTIC N/A 5/10/2017    Procedure: ESOPHAGOGASTRODUODENOSCOPY (EGD); Surgeon: Yesy Winkler MD;  Location: [facility]; Service: Gastroenterology    TONSILLECTOMY         History reviewed  No pertinent family history  I have reviewed and agree with the history as documented  Social History     Tobacco Use    Smoking status: Never Smoker    Smokeless tobacco: Never Used   Substance Use Topics    Alcohol use: No    Drug use: No        Review of Systems   Constitutional: Negative for fatigue and fever  HENT: Negative for congestion and ear pain  Eyes: Negative for discharge and redness  Respiratory: Negative for apnea, cough, shortness of breath and wheezing      Cardiovascular: Negative for chest pain and palpitations  Gastrointestinal: Negative for abdominal pain, blood in stool, diarrhea and vomiting  Endocrine: Negative for cold intolerance and polydipsia  Genitourinary: Negative for difficulty urinating and hematuria  Musculoskeletal: Negative for arthralgias and back pain  Skin: Negative for color change and rash  Allergic/Immunologic: Negative for environmental allergies and immunocompromised state  Neurological: Positive for dizziness and weakness  Negative for numbness and headaches  Hematological: Negative for adenopathy  Does not bruise/bleed easily  Psychiatric/Behavioral: Negative for agitation and behavioral problems  Physical Exam  Physical Exam   Constitutional: She is oriented to person, place, and time  Vital signs are normal  She appears well-developed and well-nourished  Non-toxic appearance  HENT:   Head: Normocephalic and atraumatic  Right Ear: Tympanic membrane and external ear normal    Left Ear: Tympanic membrane and external ear normal    Nose: Nose normal  No rhinorrhea, sinus tenderness or nasal deformity  Mouth/Throat: Uvula is midline and oropharynx is clear and moist  Normal dentition  Eyes: Pupils are equal, round, and reactive to light  Conjunctivae, EOM and lids are normal  Right eye exhibits no discharge  Left eye exhibits no discharge  Neck: Trachea normal and normal range of motion  Neck supple  No JVD present  Carotid bruit is not present  Cardiovascular: Normal rate, regular rhythm, intact distal pulses and normal pulses  No extrasystoles are present  PMI is not displaced  Pulmonary/Chest: Effort normal and breath sounds normal  No accessory muscle usage  No respiratory distress  She has no wheezes  She has no rhonchi  She has no rales  Abdominal: Soft  Normal appearance and bowel sounds are normal  She exhibits no mass  There is no tenderness  There is no rigidity, no rebound and no guarding  Musculoskeletal:        Right shoulder: She exhibits normal range of motion, no bony tenderness, no swelling and no deformity  Cervical back: Normal  She exhibits normal range of motion, no tenderness, no bony tenderness and no deformity  Lymphadenopathy:     She has no cervical adenopathy  She has no axillary adenopathy  Neurological: She is alert and oriented to person, place, and time  She has normal strength and normal reflexes  No cranial nerve deficit or sensory deficit  GCS eye subscore is 4  GCS verbal subscore is 5  GCS motor subscore is 6  Skin: Skin is warm and dry  No rash noted  Psychiatric: She has a normal mood and affect  Her speech is normal and behavior is normal    Nursing note and vitals reviewed  Vital Signs  ED Triage Vitals   Temperature Pulse Respirations Blood Pressure SpO2   09/30/19 1254 09/30/19 1254 09/30/19 1254 09/30/19 1256 09/30/19 1254   98 1 °F (36 7 °C) 88 18 147/66 94 %      Temp Source Heart Rate Source Patient Position - Orthostatic VS BP Location FiO2 (%)   09/30/19 1254 09/30/19 1254 09/30/19 1254 09/30/19 1254 --   Oral Monitor Sitting Right arm       Pain Score       09/30/19 1254       No Pain           Vitals:    09/30/19 1254 09/30/19 1256   BP:  147/66   Pulse: 88    Patient Position - Orthostatic VS: Sitting          Visual Acuity      ED Medications  Medications   meclizine (ANTIVERT) tablet 25 mg (25 mg Oral Given 9/30/19 1416)       Diagnostic Studies  Results Reviewed     Procedure Component Value Units Date/Time    Urine Microscopic [[id number]] Collected:  09/30/19 1503    Lab Status:   In process Specimen:  Urine, Clean Catch Updated:  09/30/19 1451    ED Urine Macroscopic [[id number]]  (Abnormal) Collected:  09/30/19 1503    Lab Status:  Final result Specimen:  Urine Updated:  09/30/19 1448     Color, UA Yellow     Clarity, UA Clear     pH, UA 5 5     Leukocytes, UA Trace     Nitrite, UA Negative     Protein, UA Negative mg/dl Glucose, UA Negative mg/dl      Ketones, UA Trace mg/dl      Urobilinogen, UA 0 2 E U /dl      Bilirubin, UA Negative     Blood, UA Negative     Specific Gravity, UA 1 015    Narrative:       CLINITEK RESULT    T4, free [[id number]]     Lab Status:  No result Specimen:  Blood     TSH [[id number]]  (Abnormal) Collected:  09/30/19 1313    Lab Status:  Final result Specimen:  Blood from Arm, Right Updated:  09/30/19 1357     TSH 3RD GENERATON <0 007 uIU/mL     Narrative:       Patients undergoing fluorescein dye angiography may retain small amounts of fluorescein in the body for 48-72 hours post procedure  Samples containing fluorescein can produce falsely depressed TSH values  If the patient had this procedure,a specimen should be resubmitted post fluorescein clearance        Magnesium [[id number]]  (Normal) Collected:  09/30/19 1313    Lab Status:  Final result Specimen:  Blood from Arm, Right Updated:  09/30/19 1355     Magnesium 2 0 mg/dL     Protime-INR [[id number]]  (Normal) Collected:  09/30/19 1329    Lab Status:  Final result Specimen:  Blood from Arm, Right Updated:  09/30/19 1350     Protime 13 6 seconds      INR 1 10    APTT [[id number]]  (Normal) Collected:  09/30/19 1329    Lab Status:  Final result Specimen:  Blood from Arm, Right Updated:  09/30/19 1350     PTT 27 seconds     Troponin I [[id number]]  (Normal) Collected:  09/30/19 1313    Lab Status:  Final result Specimen:  Blood from Arm, Right Updated:  09/30/19 1340     Troponin I <0 02 ng/mL     Comprehensive metabolic panel [[id number]]  (Abnormal) Collected:  09/30/19 1313    Lab Status:  Final result Specimen:  Blood from Arm, Right Updated:  09/30/19 1334     Sodium 139 mmol/L      Potassium 3 8 mmol/L      Chloride 105 mmol/L      CO2 26 mmol/L      ANION GAP 8 mmol/L      BUN 18 mg/dL      Creatinine 1 09 mg/dL      Glucose 110 mg/dL      Calcium 8 5 mg/dL      AST 36 U/L      ALT 27 U/L      Alkaline Phosphatase 51 U/L      Total Protein 6 2 g/dL Albumin 3 2 g/dL      Total Bilirubin 0 90 mg/dL      eGFR 52 ml/min/1 73sq m     Narrative:       [place] guidelines for Chronic Kidney Disease (CKD):     Stage 1 with normal or high GFR (GFR > 90 mL/min/1 73 square meters)    Stage 2 Mild CKD (GFR = 60-89 mL/min/1 73 square meters)    Stage 3A Moderate CKD (GFR = 45-59 mL/min/1 73 square meters)    Stage 3B Moderate CKD (GFR = 30-44 mL/min/1 73 square meters)    Stage 4 Severe CKD (GFR = 15-29 mL/min/1 73 square meters)    Stage 5 End Stage CKD (GFR <15 mL/min/1 73 square meters)  Note: GFR calculation is accurate only with a steady state creatinine    B-type natriuretic peptide [[id number]]     Lab Status:  No result Specimen:  Blood     CBC and differential [[id number]]  (Abnormal) Collected:  09/30/19 1313    Lab Status:  Final result Specimen:  Blood from Arm, Right Updated:  09/30/19 1319     WBC 5 41 Thousand/uL      RBC 3 68 Million/uL      Hemoglobin 12 4 g/dL      Hematocrit 37 0 %       fL      MCH 33 7 pg      MCHC 33 5 g/dL      RDW 12 6 %      MPV 8 9 fL      Platelets 545 Thousands/uL      nRBC 0 /100 WBCs      Neutrophils Relative 39 %      Immat GRANS % 0 %      Lymphocytes Relative 46 %      Monocytes Relative 12 %      Eosinophils Relative 2 %      Basophils Relative 1 %      Neutrophils Absolute 2 10 Thousands/µL      Immature Grans Absolute 0 01 Thousand/uL      Lymphocytes Absolute 2 48 Thousands/µL      Monocytes Absolute 0 66 Thousand/µL      Eosinophils Absolute 0 13 Thousand/µL      Basophils Absolute 0 03 Thousands/µL                  CT head without contrast   Final Result by Pradip De La O MD (09/30 1348)      No acute intracranial abnormality                    Workstation performed: [id number]         XR chest 2 views    (Results Pending)              Procedures  ECG 12 Lead Documentation Only  Date/Time: 9/30/2019 1:00 PM  Performed by: Vinita Begum DO  Authorized by: Vinita Begum DO Patient location:  ED  Rate:     ECG rate:  67  Rhythm:     Rhythm: sinus rhythm    Ectopy:     Ectopy: none             ED Course                               MDM  Number of Diagnoses or Management Options  Dizziness: new and requires workup  Low TSH level: established and worsening     Amount and/or Complexity of Data Reviewed  Clinical lab tests: ordered and reviewed  Tests in the radiology section of CPT®: ordered and reviewed  Tests in the medicine section of CPT®: ordered and reviewed  Independent visualization of images, tracings, or specimens: yes    Risk of Complications, Morbidity, and/or Mortality  General comments: Patient's daughter reports that last week she did see her family doctor who decrease the amount of her Synthroid due to her abnormal TSH  They did not offer me this information when I 1st saw and evaluated her    Patient Progress  Patient progress: stable      Disposition  Final diagnoses:   Dizziness   Low TSH level     Time reflects when diagnosis was documented in both MDM as applicable and the Disposition within this note     Time User Action Codes Description Comment    9/30/2019  2:46 PM Eber  K Add [R42] Dizziness     9/30/2019  2:46 PM Rito Mclean Add [E03 9] Hypothyroid     9/30/2019  2:58 PM Vicki Mclean Remove [E03 9] Hypothyroid     9/30/2019  2:58 PM Rito Mclean Add [E05 90] Hyperthyroidism     9/30/2019  2:58 PM Rito Mclean K Remove [E05 90] Hyperthyroidism     9/30/2019  2:58 PM Hiral Calderon Add [R79 89] Low TSH level       ED Disposition     ED Disposition Condition Date/Time Comment    Discharge Stable Mon Sep 30, 7793  4:06 PM Mahendra Sampson discharge to home/self care  Follow-up Information     Follow up With Specialties Details Why Contact Dagoberto Galicia MD Internal Medicine Schedule an appointment as soon as possible for a visit   [facility]  [address]  [address] [place] [address] Otolaryngology Schedule an appointment as soon as possible for a visit   [address]  [address] [place] [address]            Patient's Medications   Discharge Prescriptions    MECLIZINE (ANTIVERT) 25 MG TABLET    Take 1 tablet (25 mg total) by mouth 3 (three) times a day as needed for dizziness       Start Date: 9/30/2019 End Date: --       Order Dose: 25 mg       Quantity: 30 tablet    Refills: 0     No discharge procedures on file      ED Provider  Electronically Signed by           Korina Stack,   09/30/19 Corwith Renata,   09/30/19 Andry Yanez, DO  09/30/19 9860

## 2019-10-02 LAB
ATRIAL RATE: 67 BPM
P AXIS: 39 DEGREES
PR INTERVAL: 120 MS
QRS AXIS: 3 DEGREES
QRSD INTERVAL: 106 MS
QT INTERVAL: 386 MS
QTC INTERVAL: 407 MS
T WAVE AXIS: 56 DEGREES
VENTRICULAR RATE: 67 BPM

## 2019-10-02 PROCEDURE — 93010 ELECTROCARDIOGRAM REPORT: CPT | Performed by: INTERNAL MEDICINE

## 2019-10-06 ENCOUNTER — HOSPITAL ENCOUNTER (OUTPATIENT)
Dept: MRI IMAGING | Facility: HOSPITAL | Age: 69
Discharge: HOME | End: 2019-10-06
Payer: MEDICARE

## 2019-10-06 DIAGNOSIS — F02.80 ALZHEIMER'S DEMENTIA WITHOUT BEHAVIORAL DISTURBANCE, UNSPECIFIED TIMING OF DEMENTIA ONSET (HCC): ICD-10-CM

## 2019-10-06 DIAGNOSIS — G30.9 ALZHEIMER'S DEMENTIA WITHOUT BEHAVIORAL DISTURBANCE, UNSPECIFIED TIMING OF DEMENTIA ONSET (HCC): ICD-10-CM

## 2019-10-06 PROCEDURE — 70551 MRI BRAIN STEM W/O DYE: CPT

## 2019-12-04 ENCOUNTER — TRANSCRIBE ORDERS (OUTPATIENT)
Dept: ADMINISTRATIVE | Facility: HOSPITAL | Age: 69
End: 2019-12-04

## 2019-12-04 DIAGNOSIS — Z85.3 PERSONAL HISTORY OF MALIGNANT NEOPLASM OF BREAST: Primary | ICD-10-CM

## 2019-12-04 DIAGNOSIS — N64.4 MASTODYNIA: ICD-10-CM

## 2019-12-19 ENCOUNTER — HOSPITAL ENCOUNTER (OUTPATIENT)
Dept: RADIOLOGY | Facility: HOSPITAL | Age: 69
Discharge: HOME | End: 2019-12-19
Payer: MEDICARE

## 2019-12-19 DIAGNOSIS — Z85.3 PERSONAL HISTORY OF MALIGNANT NEOPLASM OF BREAST: ICD-10-CM

## 2019-12-19 DIAGNOSIS — N64.4 MASTODYNIA: ICD-10-CM

## 2019-12-19 PROCEDURE — 77065 DX MAMMO INCL CAD UNI: CPT

## 2019-12-19 PROCEDURE — G0279 TOMOSYNTHESIS, MAMMO: HCPCS

## 2019-12-19 PROCEDURE — 76642 ULTRASOUND BREAST LIMITED: CPT

## 2021-01-09 ENCOUNTER — HOSPITAL ENCOUNTER (INPATIENT)
Facility: HOSPITAL | Age: 71
LOS: 11 days | Discharge: HOME HEALTH | DRG: 177 | End: 2021-01-20
Attending: EMERGENCY MEDICINE | Admitting: INTERNAL MEDICINE
Payer: MEDICARE

## 2021-01-09 ENCOUNTER — APPOINTMENT (EMERGENCY)
Dept: RADIOLOGY | Facility: HOSPITAL | Age: 71
DRG: 177 | End: 2021-01-09
Payer: MEDICARE

## 2021-01-09 DIAGNOSIS — U07.1 COVID-19: Primary | ICD-10-CM

## 2021-01-09 DIAGNOSIS — R09.02 HYPOXIA: ICD-10-CM

## 2021-01-09 DIAGNOSIS — Z74.09 IMMOBILITY: ICD-10-CM

## 2021-01-09 DIAGNOSIS — R41.0 CONFUSION: ICD-10-CM

## 2021-01-09 PROBLEM — G93.40 ACUTE ENCEPHALOPATHY: Status: ACTIVE | Noted: 2021-01-09

## 2021-01-09 PROBLEM — F03.90 DEMENTIA (HCC): Status: ACTIVE | Noted: 2021-01-09

## 2021-01-09 PROBLEM — Z85.3 HISTORY OF BREAST CANCER: Status: ACTIVE | Noted: 2021-01-09

## 2021-01-09 PROBLEM — I10 HYPERTENSION: Status: ACTIVE | Noted: 2021-01-09

## 2021-01-09 PROBLEM — J96.01 ACUTE RESPIRATORY FAILURE WITH HYPOXIA (HCC): Status: ACTIVE | Noted: 2021-01-09

## 2021-01-09 LAB
ALBUMIN SERPL BCP-MCNC: 2.5 G/DL (ref 3.5–5)
ALP SERPL-CCNC: 55 U/L (ref 46–116)
ALT SERPL W P-5'-P-CCNC: 53 U/L (ref 12–78)
ANION GAP SERPL CALCULATED.3IONS-SCNC: 6 MMOL/L (ref 4–13)
AST SERPL W P-5'-P-CCNC: 32 U/L (ref 5–45)
BASE EX.OXY STD BLDV CALC-SCNC: 68.6 % (ref 60–80)
BASE EXCESS BLDV CALC-SCNC: 2.2 MMOL/L
BASOPHILS # BLD MANUAL: 0 THOUSAND/UL (ref 0–0.1)
BASOPHILS NFR MAR MANUAL: 0 % (ref 0–1)
BILIRUB DIRECT SERPL-MCNC: 0.14 MG/DL (ref 0–0.2)
BILIRUB SERPL-MCNC: 0.4 MG/DL (ref 0.2–1)
BUN SERPL-MCNC: 24 MG/DL (ref 5–25)
CALCIUM SERPL-MCNC: 8.4 MG/DL (ref 8.3–10.1)
CHLORIDE SERPL-SCNC: 101 MMOL/L (ref 100–108)
CO2 SERPL-SCNC: 29 MMOL/L (ref 21–32)
CREAT SERPL-MCNC: 1.16 MG/DL (ref 0.6–1.3)
EOSINOPHIL # BLD MANUAL: 0 THOUSAND/UL (ref 0–0.4)
EOSINOPHIL NFR BLD MANUAL: 0 % (ref 0–6)
ERYTHROCYTE [DISTWIDTH] IN BLOOD BY AUTOMATED COUNT: 13.1 % (ref 11.6–15.1)
FLUAV RNA RESP QL NAA+PROBE: NEGATIVE
FLUBV RNA RESP QL NAA+PROBE: NEGATIVE
GFR SERPL CREATININE-BSD FRML MDRD: 47 ML/MIN/1.73SQ M
GLUCOSE SERPL-MCNC: 219 MG/DL (ref 65–140)
HCO3 BLDV-SCNC: 25.6 MMOL/L (ref 24–30)
HCT VFR BLD AUTO: 39.7 % (ref 34.8–46.1)
HGB BLD-MCNC: 13.3 G/DL (ref 11.5–15.4)
LYMPHOCYTES # BLD AUTO: 0.51 THOUSAND/UL (ref 0.6–4.47)
LYMPHOCYTES # BLD AUTO: 3 % (ref 14–44)
MCH RBC QN AUTO: 34.2 PG (ref 26.8–34.3)
MCHC RBC AUTO-ENTMCNC: 33.5 G/DL (ref 31.4–37.4)
MCV RBC AUTO: 102 FL (ref 82–98)
MONOCYTES # BLD AUTO: 0.67 THOUSAND/UL (ref 0–1.22)
MONOCYTES NFR BLD: 4 % (ref 4–12)
NEUTROPHILS # BLD MANUAL: 15.5 THOUSAND/UL (ref 1.85–7.62)
NEUTS BAND NFR BLD MANUAL: 3 % (ref 0–8)
NEUTS SEG NFR BLD AUTO: 89 % (ref 43–75)
NRBC BLD AUTO-RTO: 0 /100 WBCS
O2 CT BLDV-SCNC: 13.6 ML/DL
PCO2 BLDV: 36.1 MM HG (ref 42–50)
PH BLDV: 7.47 [PH] (ref 7.3–7.4)
PLATELET # BLD AUTO: 189 THOUSANDS/UL (ref 149–390)
PLATELET BLD QL SMEAR: ADEQUATE
PMV BLD AUTO: 9.3 FL (ref 8.9–12.7)
PO2 BLDV: 33.9 MM HG (ref 35–45)
POTASSIUM SERPL-SCNC: 4.4 MMOL/L (ref 3.5–5.3)
PROT SERPL-MCNC: 6 G/DL (ref 6.4–8.2)
RBC # BLD AUTO: 3.89 MILLION/UL (ref 3.81–5.12)
RSV RNA RESP QL NAA+PROBE: NEGATIVE
SARS-COV-2 RNA RESP QL NAA+PROBE: POSITIVE
SODIUM SERPL-SCNC: 136 MMOL/L (ref 136–145)
TOTAL CELLS COUNTED SPEC: 100
TROPONIN I SERPL-MCNC: <0.02 NG/ML
VARIANT LYMPHS # BLD AUTO: 1 %
WBC # BLD AUTO: 16.85 THOUSAND/UL (ref 4.31–10.16)
WBC TOXIC VACUOLES BLD QL SMEAR: PRESENT

## 2021-01-09 PROCEDURE — 84484 ASSAY OF TROPONIN QUANT: CPT | Performed by: EMERGENCY MEDICINE

## 2021-01-09 PROCEDURE — 36415 COLL VENOUS BLD VENIPUNCTURE: CPT | Performed by: EMERGENCY MEDICINE

## 2021-01-09 PROCEDURE — 71045 X-RAY EXAM CHEST 1 VIEW: CPT

## 2021-01-09 PROCEDURE — 1124F ACP DISCUSS-NO DSCNMKR DOCD: CPT | Performed by: EMERGENCY MEDICINE

## 2021-01-09 PROCEDURE — 99285 EMERGENCY DEPT VISIT HI MDM: CPT | Performed by: EMERGENCY MEDICINE

## 2021-01-09 PROCEDURE — 80048 BASIC METABOLIC PNL TOTAL CA: CPT | Performed by: EMERGENCY MEDICINE

## 2021-01-09 PROCEDURE — XW033E5 INTRODUCTION OF REMDESIVIR ANTI-INFECTIVE INTO PERIPHERAL VEIN, PERCUTANEOUS APPROACH, NEW TECHNOLOGY GROUP 5: ICD-10-PCS | Performed by: INTERNAL MEDICINE

## 2021-01-09 PROCEDURE — 94664 DEMO&/EVAL PT USE INHALER: CPT

## 2021-01-09 PROCEDURE — 80076 HEPATIC FUNCTION PANEL: CPT | Performed by: EMERGENCY MEDICINE

## 2021-01-09 PROCEDURE — 0241U HB NFCT DS VIR RESP RNA 4 TRGT: CPT | Performed by: EMERGENCY MEDICINE

## 2021-01-09 PROCEDURE — 99223 1ST HOSP IP/OBS HIGH 75: CPT | Performed by: INTERNAL MEDICINE

## 2021-01-09 PROCEDURE — 85027 COMPLETE CBC AUTOMATED: CPT | Performed by: EMERGENCY MEDICINE

## 2021-01-09 PROCEDURE — 85007 BL SMEAR W/DIFF WBC COUNT: CPT | Performed by: EMERGENCY MEDICINE

## 2021-01-09 PROCEDURE — 99285 EMERGENCY DEPT VISIT HI MDM: CPT

## 2021-01-09 PROCEDURE — 82805 BLOOD GASES W/O2 SATURATION: CPT | Performed by: EMERGENCY MEDICINE

## 2021-01-09 RX ORDER — ZINC SULFATE 50(220)MG
220 CAPSULE ORAL DAILY
Status: COMPLETED | OUTPATIENT
Start: 2021-01-10 | End: 2021-01-16

## 2021-01-09 RX ORDER — ONDANSETRON 2 MG/ML
4 INJECTION INTRAMUSCULAR; INTRAVENOUS EVERY 6 HOURS PRN
Status: DISCONTINUED | OUTPATIENT
Start: 2021-01-09 | End: 2021-01-20 | Stop reason: HOSPADM

## 2021-01-09 RX ORDER — MELATONIN
2000 DAILY
Status: DISCONTINUED | OUTPATIENT
Start: 2021-01-10 | End: 2021-01-20 | Stop reason: HOSPADM

## 2021-01-09 RX ORDER — DEXAMETHASONE SODIUM PHOSPHATE 4 MG/ML
6 INJECTION, SOLUTION INTRA-ARTICULAR; INTRALESIONAL; INTRAMUSCULAR; INTRAVENOUS; SOFT TISSUE EVERY 24 HOURS
Status: COMPLETED | OUTPATIENT
Start: 2021-01-09 | End: 2021-01-18

## 2021-01-09 RX ORDER — ACETAMINOPHEN 325 MG/1
650 TABLET ORAL EVERY 6 HOURS PRN
Status: DISCONTINUED | OUTPATIENT
Start: 2021-01-09 | End: 2021-01-20 | Stop reason: HOSPADM

## 2021-01-09 RX ORDER — HEPARIN SODIUM 5000 [USP'U]/ML
5000 INJECTION, SOLUTION INTRAVENOUS; SUBCUTANEOUS EVERY 8 HOURS SCHEDULED
Status: DISCONTINUED | OUTPATIENT
Start: 2021-01-09 | End: 2021-01-20 | Stop reason: HOSPADM

## 2021-01-09 RX ORDER — DILTIAZEM HYDROCHLORIDE 60 MG/1
120 CAPSULE, EXTENDED RELEASE ORAL EVERY 12 HOURS SCHEDULED
Status: DISCONTINUED | OUTPATIENT
Start: 2021-01-09 | End: 2021-01-20 | Stop reason: HOSPADM

## 2021-01-09 RX ORDER — TAMOXIFEN CITRATE 10 MG/1
20 TABLET ORAL 2 TIMES DAILY
Status: DISCONTINUED | OUTPATIENT
Start: 2021-01-09 | End: 2021-01-20 | Stop reason: HOSPADM

## 2021-01-09 RX ORDER — PRAVASTATIN SODIUM 40 MG
40 TABLET ORAL
Status: DISCONTINUED | OUTPATIENT
Start: 2021-01-10 | End: 2021-01-20 | Stop reason: HOSPADM

## 2021-01-09 RX ORDER — ASPIRIN 81 MG/1
81 TABLET ORAL DAILY
Status: DISCONTINUED | OUTPATIENT
Start: 2021-01-10 | End: 2021-01-20 | Stop reason: HOSPADM

## 2021-01-09 RX ORDER — MULTIVITAMIN/IRON/FOLIC ACID 18MG-0.4MG
1 TABLET ORAL DAILY
Status: DISCONTINUED | OUTPATIENT
Start: 2021-01-17 | End: 2021-01-20 | Stop reason: HOSPADM

## 2021-01-09 RX ORDER — MEMANTINE HYDROCHLORIDE 5 MG/1
10 TABLET ORAL 2 TIMES DAILY
Status: DISCONTINUED | OUTPATIENT
Start: 2021-01-09 | End: 2021-01-20 | Stop reason: HOSPADM

## 2021-01-09 RX ORDER — DONEPEZIL HYDROCHLORIDE 5 MG/1
10 TABLET, FILM COATED ORAL
Status: DISCONTINUED | OUTPATIENT
Start: 2021-01-09 | End: 2021-01-20 | Stop reason: HOSPADM

## 2021-01-09 RX ORDER — ASCORBIC ACID 500 MG
1000 TABLET ORAL EVERY 12 HOURS SCHEDULED
Status: COMPLETED | OUTPATIENT
Start: 2021-01-09 | End: 2021-01-16

## 2021-01-09 RX ADMIN — TAMOXIFEN CITRATE 20 MG: 10 TABLET ORAL at 21:29

## 2021-01-09 RX ADMIN — DONEPEZIL HYDROCHLORIDE 10 MG: 5 TABLET ORAL at 21:28

## 2021-01-09 RX ADMIN — DILTIAZEM HYDROCHLORIDE 120 MG: 60 CAPSULE, EXTENDED RELEASE ORAL at 21:29

## 2021-01-09 RX ADMIN — DEXAMETHASONE SODIUM PHOSPHATE 6 MG: 4 INJECTION INTRA-ARTICULAR; INTRALESIONAL; INTRAMUSCULAR; INTRAVENOUS; SOFT TISSUE at 21:28

## 2021-01-09 RX ADMIN — OXYCODONE HYDROCHLORIDE AND ACETAMINOPHEN 1000 MG: 500 TABLET ORAL at 21:28

## 2021-01-09 RX ADMIN — MEMANTINE 10 MG: 10 TABLET ORAL at 21:28

## 2021-01-09 RX ADMIN — HEPARIN SODIUM 5000 UNITS: 5000 INJECTION INTRAVENOUS; SUBCUTANEOUS at 21:28

## 2021-01-09 RX ADMIN — REMDESIVIR 200 MG: 100 INJECTION, POWDER, LYOPHILIZED, FOR SOLUTION INTRAVENOUS at 22:00

## 2021-01-09 NOTE — ED NOTES
Patient unable to answer questions, just answers " I knew them before"     Paige Johnson  01/09/21 5607

## 2021-01-09 NOTE — ED NOTES
Patient constantly at the door and opening it stating she would like to go home  Has been explained multiple times that we would have to contact the dr with her wishes and patient agreeable to that however, within mins is back at the door asking to go home    Unknown if this is patient's normal mental status however, primary RN made aware and will speak with patient        Qamar Viramontes  01/09/21 1760

## 2021-01-10 LAB
ANION GAP SERPL CALCULATED.3IONS-SCNC: 9 MMOL/L (ref 4–13)
BUN SERPL-MCNC: 28 MG/DL (ref 5–25)
CALCIUM SERPL-MCNC: 8.1 MG/DL (ref 8.3–10.1)
CHLORIDE SERPL-SCNC: 101 MMOL/L (ref 100–108)
CO2 SERPL-SCNC: 24 MMOL/L (ref 21–32)
CREAT SERPL-MCNC: 1.21 MG/DL (ref 0.6–1.3)
ERYTHROCYTE [DISTWIDTH] IN BLOOD BY AUTOMATED COUNT: 13.1 % (ref 11.6–15.1)
GFR SERPL CREATININE-BSD FRML MDRD: 45 ML/MIN/1.73SQ M
GLUCOSE SERPL-MCNC: 157 MG/DL (ref 65–140)
GLUCOSE SERPL-MCNC: 181 MG/DL (ref 65–140)
GLUCOSE SERPL-MCNC: 254 MG/DL (ref 65–140)
GLUCOSE SERPL-MCNC: 359 MG/DL (ref 65–140)
HCT VFR BLD AUTO: 38.8 % (ref 34.8–46.1)
HGB BLD-MCNC: 13 G/DL (ref 11.5–15.4)
MCH RBC QN AUTO: 34.2 PG (ref 26.8–34.3)
MCHC RBC AUTO-ENTMCNC: 33.5 G/DL (ref 31.4–37.4)
MCV RBC AUTO: 102 FL (ref 82–98)
PLATELET # BLD AUTO: 162 THOUSANDS/UL (ref 149–390)
PMV BLD AUTO: 9.2 FL (ref 8.9–12.7)
POTASSIUM SERPL-SCNC: 4.4 MMOL/L (ref 3.5–5.3)
RBC # BLD AUTO: 3.8 MILLION/UL (ref 3.81–5.12)
SODIUM SERPL-SCNC: 134 MMOL/L (ref 136–145)
WBC # BLD AUTO: 11.83 THOUSAND/UL (ref 4.31–10.16)

## 2021-01-10 PROCEDURE — 82948 REAGENT STRIP/BLOOD GLUCOSE: CPT

## 2021-01-10 PROCEDURE — 80048 BASIC METABOLIC PNL TOTAL CA: CPT | Performed by: INTERNAL MEDICINE

## 2021-01-10 PROCEDURE — 99233 SBSQ HOSP IP/OBS HIGH 50: CPT | Performed by: INTERNAL MEDICINE

## 2021-01-10 PROCEDURE — 82728 ASSAY OF FERRITIN: CPT | Performed by: INTERNAL MEDICINE

## 2021-01-10 PROCEDURE — 85027 COMPLETE CBC AUTOMATED: CPT | Performed by: INTERNAL MEDICINE

## 2021-01-10 RX ORDER — OLANZAPINE 10 MG/1
2.5 INJECTION, POWDER, LYOPHILIZED, FOR SOLUTION INTRAMUSCULAR ONCE
Status: COMPLETED | OUTPATIENT
Start: 2021-01-10 | End: 2021-01-10

## 2021-01-10 RX ORDER — HALOPERIDOL 5 MG/ML
2 INJECTION INTRAMUSCULAR ONCE
Status: COMPLETED | OUTPATIENT
Start: 2021-01-11 | End: 2021-01-11

## 2021-01-10 RX ORDER — INSULIN GLARGINE 100 [IU]/ML
6 INJECTION, SOLUTION SUBCUTANEOUS
Status: DISCONTINUED | OUTPATIENT
Start: 2021-01-10 | End: 2021-01-11

## 2021-01-10 RX ADMIN — WATER 10 ML: 1 INJECTION INTRAMUSCULAR; INTRAVENOUS; SUBCUTANEOUS at 21:45

## 2021-01-10 RX ADMIN — TAMOXIFEN CITRATE 20 MG: 10 TABLET ORAL at 18:17

## 2021-01-10 RX ADMIN — LOSARTAN POTASSIUM: 50 TABLET, FILM COATED ORAL at 08:29

## 2021-01-10 RX ADMIN — INSULIN LISPRO 1 UNITS: 100 INJECTION, SOLUTION INTRAVENOUS; SUBCUTANEOUS at 18:19

## 2021-01-10 RX ADMIN — ZINC SULFATE 220 MG (50 MG) CAPSULE 220 MG: CAPSULE at 08:30

## 2021-01-10 RX ADMIN — ASPIRIN 81 MG: 81 TABLET, COATED ORAL at 08:29

## 2021-01-10 RX ADMIN — HEPARIN SODIUM 5000 UNITS: 5000 INJECTION INTRAVENOUS; SUBCUTANEOUS at 21:45

## 2021-01-10 RX ADMIN — LEVOTHYROXINE SODIUM 137 MCG: 25 TABLET ORAL at 05:41

## 2021-01-10 RX ADMIN — MEMANTINE 10 MG: 10 TABLET ORAL at 08:30

## 2021-01-10 RX ADMIN — TAMOXIFEN CITRATE 20 MG: 10 TABLET ORAL at 08:31

## 2021-01-10 RX ADMIN — DILTIAZEM HYDROCHLORIDE 120 MG: 60 CAPSULE, EXTENDED RELEASE ORAL at 08:30

## 2021-01-10 RX ADMIN — PRAVASTATIN SODIUM 40 MG: 40 TABLET ORAL at 18:26

## 2021-01-10 RX ADMIN — REMDESIVIR 100 MG: 100 INJECTION, POWDER, LYOPHILIZED, FOR SOLUTION INTRAVENOUS at 22:00

## 2021-01-10 RX ADMIN — INSULIN GLARGINE 6 UNITS: 100 INJECTION, SOLUTION SUBCUTANEOUS at 14:24

## 2021-01-10 RX ADMIN — INSULIN LISPRO 1 UNITS: 100 INJECTION, SOLUTION INTRAVENOUS; SUBCUTANEOUS at 21:48

## 2021-01-10 RX ADMIN — Medication 2000 UNITS: at 08:29

## 2021-01-10 RX ADMIN — OLANZAPINE 2.5 MG: 10 INJECTION, POWDER, FOR SOLUTION INTRAMUSCULAR at 21:44

## 2021-01-10 RX ADMIN — DEXAMETHASONE SODIUM PHOSPHATE 6 MG: 4 INJECTION INTRA-ARTICULAR; INTRALESIONAL; INTRAMUSCULAR; INTRAVENOUS; SOFT TISSUE at 21:45

## 2021-01-10 RX ADMIN — MEMANTINE 10 MG: 10 TABLET ORAL at 18:17

## 2021-01-10 RX ADMIN — INSULIN LISPRO 2 UNITS: 100 INJECTION, SOLUTION INTRAVENOUS; SUBCUTANEOUS at 14:26

## 2021-01-10 RX ADMIN — OXYCODONE HYDROCHLORIDE AND ACETAMINOPHEN 1000 MG: 500 TABLET ORAL at 08:29

## 2021-01-10 RX ADMIN — HEPARIN SODIUM 5000 UNITS: 5000 INJECTION INTRAVENOUS; SUBCUTANEOUS at 05:41

## 2021-01-10 RX ADMIN — DILTIAZEM HYDROCHLORIDE 120 MG: 60 CAPSULE, EXTENDED RELEASE ORAL at 21:47

## 2021-01-10 RX ADMIN — DONEPEZIL HYDROCHLORIDE 10 MG: 5 TABLET ORAL at 21:46

## 2021-01-10 RX ADMIN — HEPARIN SODIUM 5000 UNITS: 5000 INJECTION INTRAVENOUS; SUBCUTANEOUS at 14:24

## 2021-01-10 RX ADMIN — OXYCODONE HYDROCHLORIDE AND ACETAMINOPHEN 1000 MG: 500 TABLET ORAL at 21:46

## 2021-01-10 NOTE — ASSESSMENT & PLAN NOTE
Currently on 2 liters of oxygen sating >92 percent  Continue oxygen supplementation p r n    Will read use oxygen as able

## 2021-01-10 NOTE — H&P
H&P- Chandler Evans [birth year], 69 y o  female MRN: [id number]    Unit/Bed#: ED 07 Encounter: [id number]    Primary Care Provider: Darron Vargas MD   Date and time admitted to hospital: 1/9/2021  3:20 PM    * COVID-19  Assessment & Plan  Will place patient on mild COVID-19 pathway  Will initiate iv remdesvir and Decadron   Continue oxygen supplementation  Multicolored let    Acute encephalopathy  Assessment & Plan  Likely toxic metabolic in the setting of acute COVID-19 infection with underlying dementia  Patient appears confused and trying to leave  Discussed with patient and explained about staying in the hospital due to hypoxia and requiring oxygen supplementation at this time  Encourage reorientation and delirium precautions  Fall precautions    Acute respiratory failure with hypoxia ([facility] [address] )  Assessment & Plan  Currently on 2 liters of oxygen sating >92 percent  Continue oxygen supplementation p r n  History of breast cancer  Assessment & Plan  Continue home dose tamoxifen    Dementia ([facility] [address] )  Assessment & Plan  Continue home medications Aricept and Namenda    Hypertension  Assessment & Plan  Blood pressure appears controlled  Continue home medications losartan/hydrochlorothiazide, Cardizem  Continue to monitor    VTE Prophylaxis: Heparin  / sequential compression device   Code Status:  Full code as per daughter  POLST: POLST form is not discussed and not completed at this time  Discussion with family:  Discussed with daughter over the phone    Anticipated Length of Stay:  Patient will be admitted on an Inpatient basis with an anticipated length of stay of  >2 midnights  Justification for Hospital Stay:  COVID-19    Total Time for Visit, including Counseling / Coordination of Care: 30 minutes  Greater than 50% of this total time spent on direct patient counseling and coordination of care  Chief Complaint:  Confusion, shortness of breath    History of Present Illness:    Chandler Evans is a 69 y o  female with past medical history of hypertension, breast cancer, dementia who presents for evaluation of confusion and hypoxia  As per patient's daughter patient complaining of increased work of breathing, was initially [place] +ve on , but continued to have shortness of breath, progressively getting worse over the last couple days  Also daughter has noticed patient being more confused than her baseline and was not acting herself  Reported fevers and chills at home  Had multiple episodes of diarrhea yesterday  Review of Systems:    Review of Systems   Constitutional: Positive for activity change, chills and fever  Respiratory: Negative for apnea, cough, chest tightness and shortness of breath  Gastrointestinal: Positive for diarrhea  Genitourinary: Negative for dysuria  Neurological: Negative for dizziness and light-headedness  Psychiatric/Behavioral: Positive for behavioral problems and confusion  Past Medical and Surgical History:     Past Medical History:   Diagnosis Date    Breast cancer ([facility] )     Cancer ([facility] )     breast    GERD (gastroesophageal reflux disease)     Hyperlipidemia     Hypertension     Hypothyroidism     Memory difficulties     Shortness of breath        Past Surgical History:   Procedure Laterality Date    APPENDECTOMY      BREAST SURGERY       SECTION      HYSTERECTOMY      MASTECTOMY Left     [place] ESOPHAGOGASTRODUODENOSCOPY TRANSORAL DIAGNOSTIC N/A 5/10/2017    Procedure: ESOPHAGOGASTRODUODENOSCOPY (EGD); Surgeon: Christopher Malave MD;  Location: [facility]; Service: Gastroenterology    TONSILLECTOMY         Meds/Allergies:    Prior to Admission medications    Medication Sig Start Date End Date Taking?  Authorizing Provider   amoxicillin-clavulanate (AUGMENTIN) 875-125 mg per tablet Take 1 tablet by mouth every 12 (twelve) hours    Historical Provider, MD   aspirin (ECOTRIN LOW STRENGTH) 81 mg EC tablet Take 81 mg by mouth daily Historical Provider, MD   Cholecalciferol (VITAMIN D3) 56308 units CAPS Take by mouth    Historical Provider, MD   cyclobenzaprine (FLEXERIL) 10 mg tablet Take 1 tablet (10 mg total) by mouth 2 (two) times a day as needed for muscle spasms (Don't take with tramadol)  Patient not taking: Reported on 9/15/2018  1/29/18   Debora Child MD   diltiazem (DILACOR XR) 240 MG 24 hr capsule Take 240 mg by mouth daily    Historical Provider, MD   donepezil (ARICEPT) 10 mg tablet Take 10 mg by mouth daily at bedtime    Historical Provider, MD   DULoxetine (CYMBALTA) 60 mg delayed release capsule Take 60 mg by mouth daily    Historical Provider, MD   Levothyroxine Sodium 137 MCG CAPS Take by mouth    Historical Provider, MD   losartan 50 mg TABS 100 mg, hydrochlorothiazide 12 5 mg TABS 12 5 mg Take by mouth daily    Historical Provider, MD   meclizine (ANTIVERT) 25 mg tablet Take 1 tablet (25 mg total) by mouth 3 (three) times a day as needed for dizziness 9/30/19   Vicki Mclean DO   memantine (NAMENDA) 10 mg tablet Take 10 mg by mouth 2 (two) times a day    Historical Provider, MD   multivitamin (THERAGRAN) TABS Take 1 tablet by mouth daily    Historical Provider, MD   nabumetone (RELAFEN) 750 mg tablet Take 750 mg by mouth 2 (two) times a day    Historical Provider, MD   simvastatin (ZOCOR) 20 mg tablet Take 20 mg by mouth daily at bedtime    Historical Provider, MD   tamoxifen (NOLVADEX) 20 mg tablet Take 20 mg by mouth 2 (two) times a day    Historical Provider, MD     I have reviewed home medications with patient personally  Allergies:    Allergies   Allergen Reactions    Erythromycin GI Intolerance       Social History:     Marital Status: /Civil Union   Occupation:   Patient Pre-hospital Living Situation:   Patient Pre-hospital Level of Mobility:   Patient Pre-hospital Diet Restrictions:   Substance Use History:   Social History     Substance and Sexual Activity   Alcohol Use No     Social History Tobacco Use   Smoking Status Never Smoker   Smokeless Tobacco Never Used     Social History     Substance and Sexual Activity   Drug Use No       Family History:    History reviewed  No pertinent family history  Physical Exam:     Vitals:   Blood Pressure: 147/76 (01/09/21 1915)  Pulse: 79 (01/09/21 1915)  Temperature: 98 2 °F (36 8 °C) (01/09/21 1530)  Temp Source: Oral (01/09/21 1530)  Respirations: 20 (01/09/21 1915)  SpO2: 92 % (01/09/21 1915)    Physical Exam  Vitals signs and nursing note reviewed  Constitutional:       Appearance: Normal appearance  HENT:      Head: Normocephalic and atraumatic  Neck:      Musculoskeletal: Normal range of motion  Cardiovascular:      Rate and Rhythm: Normal rate and regular rhythm  Pulmonary:      Effort: Pulmonary effort is normal  No respiratory distress  Breath sounds: Wheezing and rales present  Abdominal:      General: Abdomen is flat  Palpations: Abdomen is soft  Musculoskeletal: Normal range of motion  Skin:     General: Skin is warm and dry  Neurological:      General: No focal deficit present  Mental Status: She is alert  Comments: Patient is alert, oriented to name and place  Not oriented to time  Follows commands however pleasantly confused       Additional Data:     Lab Results: I have personally reviewed pertinent reports        Results from last 7 days   Lab Units 01/09/21  1545   WBC Thousand/uL 16 85*   HEMOGLOBIN g/dL 13 3   HEMATOCRIT % 39 7   PLATELETS Thousands/uL 189   BANDS PCT % 3   LYMPHO PCT % 3*   MONO PCT % 4   EOS PCT % 0     Results from last 7 days   Lab Units 01/09/21  1545   SODIUM mmol/L 136   POTASSIUM mmol/L 4 4   CHLORIDE mmol/L 101   CO2 mmol/L 29   BUN mg/dL 24   CREATININE mg/dL 1 16   ANION GAP mmol/L 6   CALCIUM mg/dL 8 4   ALBUMIN g/dL 2 5*   TOTAL BILIRUBIN mg/dL 0 40   ALK PHOS U/L 55   ALT U/L 53   AST U/L 32   GLUCOSE RANDOM mg/dL 219*                       Imaging: I have personally reviewed pertinent reports  XR chest 1 view portable    (Results Pending)       EKG, Pathology, and Other Studies Reviewed on Admission:   · EKG:     Allscripts / Epic Records Reviewed: Yes     ** Please Note: This note has been constructed using a voice recognition system   **

## 2021-01-10 NOTE — ASSESSMENT & PLAN NOTE
Likely toxic metabolic in the setting of acute COVID-19 infection with underlying dementia  Patient appears confused and trying to leave  Discussed with patient and explained about staying in the hospital due to hypoxia and requiring oxygen supplementation at this time      Encourage reorientation and delirium precautions  Fall precautions

## 2021-01-10 NOTE — ASSESSMENT & PLAN NOTE
Blood pressure appears controlled  Continue home medications losartan/hydrochlorothiazide, Cardizem  Continue to monitor blood pressure with holding parameters for the medication

## 2021-01-10 NOTE — PROGRESS NOTES
Progress Note - Jeannette Hemphill [birth year], 69 y o  female MRN: [id number]    Unit/Bed#: -01 Encounter: [id number]    Primary Care Provider: Joaquín Soto MD   Date and time admitted to hospital: 1/9/2021  3:20 PM        Acute encephalopathy  Assessment & Plan  Likely toxic metabolic in the setting of acute COVID-19 infection with underlying dementia  Patient appears slightly confused  Encourage reorientation and delirium precautions  Fall precautions    Acute respiratory failure with hypoxia ([address] )  Assessment & Plan  Currently on 2 liters of oxygen sating >92 percent  Continue oxygen supplementation p r n  Will read use oxygen as able    History of breast cancer  Assessment & Plan  Continue home dose tamoxifen    Dementia ([facility] [address] )  Assessment & Plan  Continue home medications Aricept and Namenda    Hypertension  Assessment & Plan  Blood pressure appears controlled  Continue home medications losartan/hydrochlorothiazide, Cardizem  Continue to monitor blood pressure with holding parameters for the medication    * COVID-19  Assessment & Plan  Patient is on mild COVID-19 pathway  Continue iv remdesvir and Decadron   Continue oxygen supplementation        TE Pharmacologic Prophylaxis: Heparin    Patient Centered Rounds: I have performed bedside rounds with nursing staff today  Discussions with Specialists or Other Care Team Provider:  With nursing  Education and Discussions with Family / Patient:  Spoke with patient  Current Length of Stay: 1 day(s)  Current Patient Status: Inpatient     Certification Statement: The patient will continue to require additional inpatient hospital stay due to COVID-19  Discharge Plan / Estimated Discharge Date: At least 2 days    Code Status: Level 1 - Full Code  ______________________________________________________________________________    Subjective:   Patient seen and examined by me  She is a little confused    Shortness of breath present but she is able to complete sentences  No high fever, chills or rigors  No nausea or vomiting at this point of time  Does have weakness which is generalized  Objective:   Vitals: Blood pressure 149/65, pulse (!) 51, temperature 98 3 °F (36 8 °C), resp  rate 18, height 5' 3" (1 6 m), weight 90 3 kg (199 lb 1 2 oz), SpO2 95 %  Physical Exam:   General exam- looks a little weak  HEENT - atraumatic and normocephalic  Neck- supple  Skin - no fresh rash  Extremities no fresh focal deformities  Cardiovascular- No visible JVD  Respiratory- no accessory muscles of respiration being used  Skin - no fresh rash  Abdomen - no visible mass  CNS- No fresh focal deficits  Psych- no acute psychosis      Additional Data:   Labs:  Results from last 7 days   Lab Units 01/10/21  0500 01/09/21  1545   WBC Thousand/uL 11 83* 16 85*   HEMOGLOBIN g/dL 13 0 13 3   HEMATOCRIT % 38 8 39 7   MCV fL 102* 102*   TOTAL NEUT ABS Thousand/uL  --  15 50*   BANDS PCT %  --  3   PLATELETS Thousands/uL 162 189     Results from last 7 days   Lab Units 01/10/21  0500 01/09/21  1545   SODIUM mmol/L 134* 136   POTASSIUM mmol/L 4 4 4 4   CHLORIDE mmol/L 101 101   CO2 mmol/L 24 29   ANION GAP mmol/L 9 6   BUN mg/dL 28* 24   CREATININE mg/dL 1 21 1 16   CALCIUM mg/dL 8 1* 8 4   ALBUMIN g/dL  --  2 5*   TOTAL BILIRUBIN mg/dL  --  0 40   ALK PHOS U/L  --  55   ALT U/L  --  53   AST U/L  --  32   EGFR ml/min/1 73sq m 45 47   GLUCOSE RANDOM mg/dL 359* 219*         Results from last 7 days   Lab Units 01/09/21  1545   TROPONIN I ng/mL <0 02                          * I Have Reviewed All Lab Data Listed Above  Cultures:   Results from last 7 days   Lab Units 01/09/21  1545   INFLUENZA A PCR  Negative     Results from last 7 days   Lab Units 01/09/21  1545   INFLUENZA A PCR  Negative   INFLUENZA B PCR  Negative   RSV PCR  Negative         Imaging:  Imaging Reports Reviewed Today Include:   No results found    Scheduled Meds:  Current Facility-Administered Medications   Medication Dose Route Frequency Provider Last Rate    acetaminophen  650 mg Oral Q6H PRN Forrest Murphy MD      ascorbic acid  1,000 mg Oral Q12H Mary Mckeon MD      aspirin  81 mg Oral Daily Forrest Murphy MD      cholecalciferol  2,000 Units Oral Daily Forrest Murphy MD      dexamethasone  6 mg Intravenous Q24H Forrest Murphy MD      diltiazem  120 mg Oral Q12H Mary Mckeon MD      donepezil  10 mg Oral HS Forrest Murphy MD      heparin (porcine)  5,000 Units Subcutaneous Q8H [facility] Forrest Murphy MD      levothyroxine  137 mcg Oral Early Morning Forrest Murphy MD      losartan potassium-hydrochlorothiazide (HYZAAR 100/12  5) combo dose   Oral Daily Forrest Murphy MD      memantine  10 mg Oral BID Forrest Murphy MD      zinc sulfate  220 mg Oral Daily Forrest Murphy MD      Followed by   Alber Coy ON 1/17/2021] multivitamin-minerals  1 tablet Oral Daily Forrest Murphy MD      ondansetron  4 mg Intravenous Q6H PRN Forrest Murphy MD      pravastatin  40 mg Oral Daily With Evelyn Lora MD      remdesivir  100 mg Intravenous Q24H Forrest Murphy MD      tamoxifen  20 mg Oral BID MD Valentin Gan MD [address] [facility]    ** Please Note: This note has been constructed using a voice recognition system   **

## 2021-01-10 NOTE — PLAN OF CARE
Problem: Potential for Falls  Goal: Patient will remain free of falls  Description: INTERVENTIONS:  - Assess patient frequently for physical needs  -  Identify cognitive and physical deficits and behaviors that affect risk of falls    -  [place] fall precautions as indicated by assessment   - Educate patient/family on patient safety including physical limitations  - Instruct patient to call for assistance with activity based on assessment  - Modify environment to reduce risk of injury  - Consider OT/PT consult to assist with strengthening/mobility  Outcome: Not Progressing     Problem: PAIN - ADULT  Goal: Verbalizes/displays adequate comfort level or baseline comfort level  Description: Interventions:  - Encourage patient to monitor pain and request assistance  - Assess pain using appropriate pain scale  - Administer analgesics based on type and severity of pain and evaluate response  - Implement non-pharmacological measures as appropriate and evaluate response  - Consider cultural and social influences on pain and pain management  - Notify physician/advanced practitioner if interventions unsuccessful or patient reports new pain  Outcome: Not Progressing     Problem: INFECTION - ADULT  Goal: Absence or prevention of progression during hospitalization  Description: INTERVENTIONS:  - Assess and monitor for signs and symptoms of infection  - Monitor lab/diagnostic results  - Monitor all insertion sites, i e  indwelling lines, tubes, and drains  - Monitor endotracheal if appropriate and nasal secretions for changes in amount and color  - [place] appropriate cooling/warming therapies per order  - Administer medications as ordered  - Instruct and encourage patient and family to use good hand hygiene technique  - Identify and instruct in appropriate isolation precautions for identified infection/condition  Outcome: Not Progressing  Goal: Absence of fever/infection during neutropenic period  Description: INTERVENTIONS:  - Monitor WBC    Outcome: Not Progressing     Problem: RESPIRATORY - ADULT  Goal: Achieves optimal ventilation and oxygenation  Description: INTERVENTIONS:  - Assess for changes in respiratory status  - Assess for changes in mentation and behavior  - Position to facilitate oxygenation and minimize respiratory effort  - Oxygen administered by appropriate delivery if ordered  - Initiate smoking cessation education as indicated  - Encourage broncho-pulmonary hygiene including cough, deep breathe, Incentive Spirometry  - Assess the need for suctioning and aspirate as needed  - Assess and instruct to report SOB or any respiratory difficulty  - Respiratory Therapy support as indicated  Outcome: Not Progressing     Problem: METABOLIC, FLUID AND ELECTROLYTES - ADULT  Goal: Electrolytes maintained within normal limits  Description: INTERVENTIONS:  - Monitor labs and assess patient for signs and symptoms of electrolyte imbalances  - Administer electrolyte replacement as ordered  - Monitor response to electrolyte replacements, including repeat lab results as appropriate  - Instruct patient on fluid and nutrition as appropriate  Outcome: Not Progressing  Goal: Fluid balance maintained  Description: INTERVENTIONS:  - Monitor labs   - Monitor I/O and WT  - Instruct patient on fluid and nutrition as appropriate  - Assess for signs & symptoms of volume excess or deficit  Outcome: Not Progressing     Problem: HEMATOLOGIC - ADULT  Goal: Maintains hematologic stability  Description: INTERVENTIONS  - Assess for signs and symptoms of bleeding or hemorrhage  - Monitor labs  - Administer supportive blood products/factors as ordered and appropriate  Outcome: Not Progressing

## 2021-01-10 NOTE — NURSING NOTE
Patient continuously removes her O2 and arti  Had to be redirected countless times during evening shift to maintain O2 on

## 2021-01-10 NOTE — ASSESSMENT & PLAN NOTE
Blood pressure appears controlled  Continue home medications losartan/hydrochlorothiazide, Cardizem  Continue to monitor

## 2021-01-10 NOTE — ED NOTES
Patient changed to a sticker pulse ox due to her constantly pulling her pulse ox off  Patient constantly removing herself off all monitoring equipment  Patient very confused  Patient was noted to turn off the monitor advised patient she needs to keep on the monitor and the o2    Patient answers she knows however, she will remove everything as soon as RN or tech leaves room     [facility]  01/09/21 [id number]

## 2021-01-10 NOTE — RESPIRATORY THERAPY NOTE
RT Protocol Note  Steve Lopez 69 y o  female MRN: [id number]  Unit/Bed#: -01 Encounter: [id number]    Assessment    Principal Problem:    COVID-19  Active Problems:    Hypertension    Dementia ([place] [address] )    History of breast cancer    Acute respiratory failure with hypoxia ([place] [address] )    Acute encephalopathy      Home Pulmonary Medications:  none       Past Medical History:   Diagnosis Date    Arthritis     Breast cancer ([place] [address] )     Cancer ([place] [address] )     breast    Coronary artery disease     Diabetes mellitus ([place] [address] )     GERD (gastroesophageal reflux disease)     History of transfusion     Hyperlipidemia     Hypertension     Hypothyroidism     Memory difficulties     Psychiatric disorder     Shortness of breath      Social History     Socioeconomic History    Marital status: /Civil Union     Spouse name: None    Number of children: None    Years of education: None    Highest education level: None   Occupational History    None   Social Needs    Financial resource strain: None    Food insecurity     Worry: None     Inability: None    Transportation needs     Medical: None     Non-medical: None   Tobacco Use    Smoking status: Never Smoker    Smokeless tobacco: Never Used   Substance and Sexual Activity    Alcohol use: Never     Frequency: Never    Drug use: Never    Sexual activity: Not Currently     Partners: Male     Birth control/protection: Abstinence   Lifestyle    Physical activity     Days per week: None     Minutes per session: None    Stress: None   Relationships    Social connections     Talks on phone: None     Gets together: None     Attends Taoism service: None     Active member of club or organization: None     Attends meetings of clubs or organizations: None     Relationship status: None    Intimate partner violence     Fear of current or ex partner: None     Emotionally abused: None     Physically abused: None     Forced sexual activity: None   Other Topics Concern    None   Social History Narrative    None       Subjective  Pt offers no respiratory complaints at this time  Objective    Physical Exam:   Assessment Type: Assess only  General Appearance: Awake, Alert  Respiratory Pattern: Normal  Chest Assessment: Chest expansion symmetrical  Bilateral Breath Sounds: Diminished  O2 Device: 2L NC    Vitals:  Blood pressure 168/77, pulse (!) 44, temperature (!) 102 5 °F (39 2 °C), temperature source Oral, resp  rate 18, SpO2 90 %  Imaging and other studies: I have personally reviewed pertinent reports        O2 Device: 2L NC     Plan    Respiratory Plan: Discontinue Protocol, No distress/Pulmonary history

## 2021-01-10 NOTE — ASSESSMENT & PLAN NOTE
Likely toxic metabolic in the setting of acute COVID-19 infection with underlying dementia  Patient appears slightly confused     Encourage reorientation and delirium precautions  Fall precautions

## 2021-01-10 NOTE — ASSESSMENT & PLAN NOTE
Patient is on mild COVID-19 pathway    Continue iv remdesvir and Decadron   Continue oxygen supplementation

## 2021-01-10 NOTE — ED PROVIDER NOTES
History  Chief Complaint   Patient presents with    Shortness of Breath     Pt c/o increased work of breathing  Covid + on 12/30  Pt was 90% on RA upon EMS arrival      68 yo female who presents to the ED for evaluation of confusion and hypoxia  She is unable to provide history due to confusion  EMS notes recent COVID diagnosis but no documentation in Epic to verify this  Prior to Admission Medications   Prescriptions Last Dose Informant Patient Reported? Taking?    Cholecalciferol (VITAMIN D3) 67156 units CAPS   Yes No   Sig: Take by mouth   DULoxetine (CYMBALTA) 60 mg delayed release capsule   Yes No   Sig: Take 60 mg by mouth daily   Levothyroxine Sodium 137 MCG CAPS   Yes No   Sig: Take by mouth   amoxicillin-clavulanate (AUGMENTIN) 875-125 mg per tablet   Yes No   Sig: Take 1 tablet by mouth every 12 (twelve) hours   aspirin (ECOTRIN LOW STRENGTH) 81 mg EC tablet   Yes No   Sig: Take 81 mg by mouth daily   cyclobenzaprine (FLEXERIL) 10 mg tablet   No No   Sig: Take 1 tablet (10 mg total) by mouth 2 (two) times a day as needed for muscle spasms (Don't take with tramadol)   Patient not taking: Reported on 9/15/2018    diltiazem (DILACOR XR) 240 MG 24 hr capsule   Yes No   Sig: Take 240 mg by mouth daily   donepezil (ARICEPT) 10 mg tablet   Yes No   Sig: Take 10 mg by mouth daily at bedtime   losartan 50 mg TABS 100 mg, hydrochlorothiazide 12 5 mg TABS 12 5 mg   Yes No   Sig: Take by mouth daily   meclizine (ANTIVERT) 25 mg tablet   No No   Sig: Take 1 tablet (25 mg total) by mouth 3 (three) times a day as needed for dizziness   memantine (NAMENDA) 10 mg tablet   Yes No   Sig: Take 10 mg by mouth 2 (two) times a day   multivitamin (THERAGRAN) TABS   Yes No   Sig: Take 1 tablet by mouth daily   nabumetone (RELAFEN) 750 mg tablet   Yes No   Sig: Take 750 mg by mouth 2 (two) times a day   simvastatin (ZOCOR) 20 mg tablet   Yes No   Sig: Take 20 mg by mouth daily at bedtime   tamoxifen (NOLVADEX) 20 mg tablet   Yes No   Sig: Take 20 mg by mouth 2 (two) times a day      Facility-Administered Medications: None       Past Medical History:   Diagnosis Date    [facility] [address] [facility]     [facility]     breast    GERD (gastroesophageal reflux disease)     Hyperlipidemia     Hypertension     Hypothyroidism     Memory difficulties     Shortness of breath        Past Surgical History:   Procedure Laterality Date    APPENDECTOMY      BREAST SURGERY       SECTION      HYSTERECTOMY      MASTECTOMY Left     IN ESOPHAGOGASTRODUODENOSCOPY TRANSORAL DIAGNOSTIC N/A 5/10/2017    Procedure: ESOPHAGOGASTRODUODENOSCOPY (EGD); Surgeon: Osa Eisenmenger, MD;  Location: [facility]; Service: Gastroenterology    TONSILLECTOMY         History reviewed  No pertinent family history  I have reviewed and agree with the history as documented  E-Cigarette/Vaping     E-Cigarette/Vaping Substances     Social History     Tobacco Use    Smoking status: Never Smoker    Smokeless tobacco: Never Used   Substance Use Topics    Alcohol use: No    Drug use: No       Review of Systems   Unable to perform ROS: Mental status change       Physical Exam  Physical Exam  Vitals signs and nursing note reviewed  Constitutional:       General: She is not in acute distress  Appearance: Normal appearance  She is well-developed  She is not ill-appearing, toxic-appearing or diaphoretic  HENT:      Head: Normocephalic and atraumatic  Eyes:      Conjunctiva/sclera: Conjunctivae normal       Pupils: Pupils are equal, round, and reactive to light  Neck:      Musculoskeletal: Normal range of motion and neck supple  Vascular: No JVD  Cardiovascular:      Rate and Rhythm: Normal rate and regular rhythm  Heart sounds: Normal heart sounds  Pulmonary:      Effort: Pulmonary effort is normal       Breath sounds: Normal breath sounds  No stridor  Abdominal:      General: There is no distension        Palpations: Abdomen is soft  Tenderness: There is no abdominal tenderness  There is no guarding or rebound  Musculoskeletal: Normal range of motion  General: No tenderness or deformity  Skin:     General: Skin is warm and dry  Capillary Refill: Capillary refill takes less than 2 seconds  Coloration: Skin is not pale  Findings: No erythema or rash  Neurological:      General: No focal deficit present  Mental Status: She is alert  She is disoriented  Cranial Nerves: No cranial nerve deficit  Sensory: No sensory deficit  Motor: No weakness or abnormal muscle tone  Coordination: Coordination normal          Vital Signs  ED Triage Vitals   Temperature Pulse Respirations Blood Pressure SpO2   01/09/21 1530 01/09/21 1525 01/09/21 1525 01/09/21 1530 01/09/21 1525   98 2 °F (36 8 °C) 65 21 153/76 (!) 89 %      Temp Source Heart Rate Source Patient Position - Orthostatic VS BP Location FiO2 (%)   01/09/21 1530 01/09/21 1525 01/09/21 1525 01/09/21 1525 --   Oral Monitor Lying Right arm       Pain Score       --                  Vitals:    01/09/21 1525 01/09/21 1530 01/09/21 1841   BP:  153/76 148/67   Pulse: 65  79   Patient Position - Orthostatic VS: Lying  Sitting         Visual Acuity  Visual Acuity      Most Recent Value   L Pupil Size (mm)  4   R Pupil Size (mm)  4          ED Medications  Medications - No data to display    Diagnostic Studies  Results Reviewed     Procedure Component Value Units Date/Time    COVID19, Influenza A/B, RSV PCR, [facility] [[id number]]  (Abnormal) Collected: 01/09/21 1545    Lab Status: Final result Specimen: Nares from Nasopharyngeal Swab Updated: 01/09/21 1648     SARS-CoV-2 Positive     INFLUENZA A PCR Negative     INFLUENZA B PCR Negative     RSV PCR Negative    Narrative: This test has been authorized by FDA under an EUA (Emergency Use Assay) for use by authorized laboratories    Clinical caution and judgement should be used with the interpretation of these results with consideration of the clinical impression and other laboratory testing  Testing reported as "Positive" or "Negative" has been proven to be accurate according to standard laboratory validation requirements  All testing is performed with control materials showing appropriate reactivity at standard intervals      CBC and differential [[id number]]  (Abnormal) Collected: 01/09/21 1545    Lab Status: Final result Specimen: Blood from Arm, Right Updated: 01/09/21 1621     WBC 16 85 Thousand/uL      RBC 3 89 Million/uL      Hemoglobin 13 3 g/dL      Hematocrit 39 7 %       fL      MCH 34 2 pg      MCHC 33 5 g/dL      RDW 13 1 %      MPV 9 3 fL      Platelets 632 Thousands/uL      nRBC 0 /100 WBCs     Manual Differential(PHLEBS Do Not Order) [[id number]]  (Abnormal) Collected: 01/09/21 1545    Lab Status: Final result Specimen: Blood from Arm, Right Updated: 01/09/21 1621     Segmented % 89 %      Bands % 3 %      Lymphocytes % 3 %      Monocytes % 4 %      Eosinophils, % 0 %      Basophils % 0 %      Atypical Lymphocytes % 1 %      Absolute Neutrophils 15 50 Thousand/uL      Lymphocytes Absolute 0 51 Thousand/uL      Monocytes Absolute 0 67 Thousand/uL      Eosinophils Absolute 0 00 Thousand/uL      Basophils Absolute 0 00 Thousand/uL      Total Counted 100     Toxic Vacuolated Neutrophils Present     Platelet Estimate Adequate    Troponin I [[id number]]  (Normal) Collected: 01/09/21 1545    Lab Status: Final result Specimen: Blood from Arm, Right Updated: 01/09/21 1614     Troponin I <0 02 ng/mL     Basic metabolic panel [[id number]]  (Abnormal) Collected: 01/09/21 1545    Lab Status: Final result Specimen: Blood from Arm, Right Updated: 01/09/21 1611     Sodium 136 mmol/L      Potassium 4 4 mmol/L      Chloride 101 mmol/L      CO2 29 mmol/L      ANION GAP 6 mmol/L      BUN 24 mg/dL      Creatinine 1 16 mg/dL      Glucose 219 mg/dL      Calcium 8 4 mg/dL      eGFR 47 ml/min/1 73sq m     Narrative: Emely guidelines for Chronic Kidney Disease (CKD):     Stage 1 with normal or high GFR (GFR > 90 mL/min/1 73 square meters)    Stage 2 Mild CKD (GFR = 60-89 mL/min/1 73 square meters)    Stage 3A Moderate CKD (GFR = 45-59 mL/min/1 73 square meters)    Stage 3B Moderate CKD (GFR = 30-44 mL/min/1 73 square meters)    Stage 4 Severe CKD (GFR = 15-29 mL/min/1 73 square meters)    Stage 5 End Stage CKD (GFR <15 mL/min/1 73 square meters)  Note: GFR calculation is accurate only with a steady state creatinine    Hepatic function panel [[id number]]  (Abnormal) Collected: 01/09/21 1545    Lab Status: Final result Specimen: Blood from Arm, Right Updated: 01/09/21 1611     Total Bilirubin 0 40 mg/dL      Bilirubin, Direct 0 14 mg/dL      Alkaline Phosphatase 55 U/L      AST 32 U/L      ALT 53 U/L      Total Protein 6 0 g/dL      Albumin 2 5 g/dL     Blood gas, venous [[id number]]  (Abnormal) Collected: 01/09/21 1600    Lab Status: Final result Specimen: Blood from Arm, Right Updated: 01/09/21 1608     pH, El 7 469     pCO2, El 36 1 mm Hg      pO2, El 33 9 mm Hg      HCO3, El 25 6 mmol/L      Base Excess, El 2 2 mmol/L      O2 Content, El 13 6 ml/dL      O2 HGB, VENOUS 68 6 %                  XR chest 1 view portable    (Results Pending)       No ptx, no effusion, no infiltrate  Impression - normal xray        Procedures  Procedures         ED Course               Identification of Seniors at Risk      Most Recent Value   (ISAR) Identification of Seniors at Risk   Before the illness or injury that brought you to the Emergency, did you need someone to help you on a regular basis? 0 Filed at: 01/09/2021 1530   In the last 24 hours, have you needed more help than usual?  1 Filed at: 01/09/2021 1530   Have you been hospitalized for one or more nights during the past 6 months?   0 Filed at: 01/09/2021 1530   In general, do you see well?  0 Filed at: 01/09/2021 1530   In general, do you have serious problems with your memory? 0 Filed at: 01/09/2021 1530   Do you take more than three different medications every day? 1 Filed at: 01/09/2021 1530   ISAR Score  2 Filed at: 01/09/2021 1530                    SBIRT 22yo+      Most Recent Value   SBIRT (25 yo +)   In order to provide better care to our patients, we are screening all of our patients for alcohol and drug use  Would it be okay to ask you these screening questions? Yes Filed at: 01/09/2021 1545   Initial Alcohol Screen: US AUDIT-C    1  How often do you have a drink containing alcohol?  0 Filed at: 01/09/2021 1545   2  How many drinks containing alcohol do you have on a typical day you are drinking? 0 Filed at: 01/09/2021 1545   3a  Male UNDER 65: How often do you have five or more drinks on one occasion? 0 Filed at: 01/09/2021 1545   3b  FEMALE Any Age, or MALE 65+: How often do you have 4 or more drinks on one occassion? 0 Filed at: 01/09/2021 1545   Audit-C Score  0 Filed at: 01/09/2021 1545   CHANEL: How many times in the past year have you    Used an illegal drug or used a prescription medication for non-medical reasons? Never Filed at: 01/09/2021 1545                    MDM    Disposition  Final diagnoses:   COVID-19   Hypoxia   Confusion     Time reflects when diagnosis was documented in both MDM as applicable and the Disposition within this note     Time User Action Codes Description Comment    1/9/2021  5:12 PM Veronda Frisk Add [U07 1] COVID-19     1/9/2021  5:12 PM Veronda Frisk Add [R09 02] Hypoxia     1/9/2021  5:12 PM Veronda Frisk Add [R41 0] Confusion       ED Disposition     ED Disposition Condition Date/Time Comment    Admit Stable Sat Jan 9, 2021  5:12 PM Case was discussed with Dr Kaz Ryan and the patient's admission status was agreed to be Admission Status: inpatient status to the service of Dr Kaz Ryan           Follow-up Information    None         Patient's Medications   Discharge Prescriptions    No medications on file     No discharge procedures on file      PDMP Review     None          ED Provider  Electronically Signed by           Annalise Gonzalez MD  01/09/21 9804

## 2021-01-11 LAB
ALBUMIN SERPL BCP-MCNC: 2.6 G/DL (ref 3.5–5)
ALP SERPL-CCNC: 51 U/L (ref 46–116)
ALT SERPL W P-5'-P-CCNC: 59 U/L (ref 12–78)
ANION GAP SERPL CALCULATED.3IONS-SCNC: 9 MMOL/L (ref 4–13)
AST SERPL W P-5'-P-CCNC: 46 U/L (ref 5–45)
BILIRUB SERPL-MCNC: 0.5 MG/DL (ref 0.2–1)
BUN SERPL-MCNC: 28 MG/DL (ref 5–25)
CALCIUM ALBUM COR SERPL-MCNC: 9.5 MG/DL (ref 8.3–10.1)
CALCIUM SERPL-MCNC: 8.4 MG/DL (ref 8.3–10.1)
CHLORIDE SERPL-SCNC: 101 MMOL/L (ref 100–108)
CO2 SERPL-SCNC: 25 MMOL/L (ref 21–32)
CREAT SERPL-MCNC: 1.14 MG/DL (ref 0.6–1.3)
CRP SERPL QL: 52 MG/L
D DIMER PPP FEU-MCNC: 0.36 UG/ML FEU
ERYTHROCYTE [DISTWIDTH] IN BLOOD BY AUTOMATED COUNT: 13 % (ref 11.6–15.1)
GFR SERPL CREATININE-BSD FRML MDRD: 48 ML/MIN/1.73SQ M
GLUCOSE SERPL-MCNC: 152 MG/DL (ref 65–140)
GLUCOSE SERPL-MCNC: 156 MG/DL (ref 65–140)
GLUCOSE SERPL-MCNC: 191 MG/DL (ref 65–140)
GLUCOSE SERPL-MCNC: 222 MG/DL (ref 65–140)
GLUCOSE SERPL-MCNC: 227 MG/DL (ref 65–140)
HCT VFR BLD AUTO: 38.1 % (ref 34.8–46.1)
HGB BLD-MCNC: 12.7 G/DL (ref 11.5–15.4)
MCH RBC QN AUTO: 34 PG (ref 26.8–34.3)
MCHC RBC AUTO-ENTMCNC: 33.3 G/DL (ref 31.4–37.4)
MCV RBC AUTO: 102 FL (ref 82–98)
PLATELET # BLD AUTO: 168 THOUSANDS/UL (ref 149–390)
PMV BLD AUTO: 9.4 FL (ref 8.9–12.7)
POTASSIUM SERPL-SCNC: 3.7 MMOL/L (ref 3.5–5.3)
PROCALCITONIN SERPL-MCNC: 0.06 NG/ML
PROT SERPL-MCNC: 6.2 G/DL (ref 6.4–8.2)
RBC # BLD AUTO: 3.73 MILLION/UL (ref 3.81–5.12)
SODIUM SERPL-SCNC: 135 MMOL/L (ref 136–145)
WBC # BLD AUTO: 7.65 THOUSAND/UL (ref 4.31–10.16)

## 2021-01-11 PROCEDURE — 82948 REAGENT STRIP/BLOOD GLUCOSE: CPT

## 2021-01-11 PROCEDURE — 80053 COMPREHEN METABOLIC PANEL: CPT | Performed by: INTERNAL MEDICINE

## 2021-01-11 PROCEDURE — 86140 C-REACTIVE PROTEIN: CPT | Performed by: INTERNAL MEDICINE

## 2021-01-11 PROCEDURE — 85379 FIBRIN DEGRADATION QUANT: CPT | Performed by: INTERNAL MEDICINE

## 2021-01-11 PROCEDURE — 84145 PROCALCITONIN (PCT): CPT | Performed by: INTERNAL MEDICINE

## 2021-01-11 PROCEDURE — 85027 COMPLETE CBC AUTOMATED: CPT | Performed by: INTERNAL MEDICINE

## 2021-01-11 PROCEDURE — 99233 SBSQ HOSP IP/OBS HIGH 50: CPT | Performed by: INTERNAL MEDICINE

## 2021-01-11 RX ORDER — INSULIN GLARGINE 100 [IU]/ML
10 INJECTION, SOLUTION SUBCUTANEOUS
Status: DISCONTINUED | OUTPATIENT
Start: 2021-01-11 | End: 2021-01-12

## 2021-01-11 RX ORDER — OLANZAPINE 10 MG/1
2.5 INJECTION, POWDER, LYOPHILIZED, FOR SOLUTION INTRAMUSCULAR
Status: DISCONTINUED | OUTPATIENT
Start: 2021-01-11 | End: 2021-01-20 | Stop reason: HOSPADM

## 2021-01-11 RX ADMIN — HALOPERIDOL LACTATE 2 MG: 5 INJECTION, SOLUTION INTRAMUSCULAR at 00:17

## 2021-01-11 RX ADMIN — OXYCODONE HYDROCHLORIDE AND ACETAMINOPHEN 1000 MG: 500 TABLET ORAL at 23:04

## 2021-01-11 RX ADMIN — DILTIAZEM HYDROCHLORIDE 120 MG: 60 CAPSULE, EXTENDED RELEASE ORAL at 10:46

## 2021-01-11 RX ADMIN — REMDESIVIR 100 MG: 100 INJECTION, POWDER, LYOPHILIZED, FOR SOLUTION INTRAVENOUS at 23:29

## 2021-01-11 RX ADMIN — INSULIN LISPRO 1 UNITS: 100 INJECTION, SOLUTION INTRAVENOUS; SUBCUTANEOUS at 17:14

## 2021-01-11 RX ADMIN — MEMANTINE 10 MG: 10 TABLET ORAL at 10:44

## 2021-01-11 RX ADMIN — INSULIN LISPRO 1 UNITS: 100 INJECTION, SOLUTION INTRAVENOUS; SUBCUTANEOUS at 09:00

## 2021-01-11 RX ADMIN — OXYCODONE HYDROCHLORIDE AND ACETAMINOPHEN 1000 MG: 500 TABLET ORAL at 10:45

## 2021-01-11 RX ADMIN — HEPARIN SODIUM 5000 UNITS: 5000 INJECTION INTRAVENOUS; SUBCUTANEOUS at 05:25

## 2021-01-11 RX ADMIN — MEMANTINE 10 MG: 10 TABLET ORAL at 17:13

## 2021-01-11 RX ADMIN — TAMOXIFEN CITRATE 20 MG: 10 TABLET ORAL at 10:48

## 2021-01-11 RX ADMIN — DEXAMETHASONE SODIUM PHOSPHATE 6 MG: 4 INJECTION INTRA-ARTICULAR; INTRALESIONAL; INTRAMUSCULAR; INTRAVENOUS; SOFT TISSUE at 23:04

## 2021-01-11 RX ADMIN — HEPARIN SODIUM 5000 UNITS: 5000 INJECTION INTRAVENOUS; SUBCUTANEOUS at 17:13

## 2021-01-11 RX ADMIN — Medication 2000 UNITS: at 10:46

## 2021-01-11 RX ADMIN — ZINC SULFATE 220 MG (50 MG) CAPSULE 220 MG: CAPSULE at 10:44

## 2021-01-11 RX ADMIN — ASPIRIN 81 MG: 81 TABLET, COATED ORAL at 10:45

## 2021-01-11 RX ADMIN — DONEPEZIL HYDROCHLORIDE 10 MG: 5 TABLET ORAL at 23:04

## 2021-01-11 RX ADMIN — LEVOTHYROXINE SODIUM 137 MCG: 25 TABLET ORAL at 05:24

## 2021-01-11 RX ADMIN — INSULIN LISPRO 1 UNITS: 100 INJECTION, SOLUTION INTRAVENOUS; SUBCUTANEOUS at 13:00

## 2021-01-11 RX ADMIN — INSULIN LISPRO 1 UNITS: 100 INJECTION, SOLUTION INTRAVENOUS; SUBCUTANEOUS at 23:15

## 2021-01-11 RX ADMIN — PRAVASTATIN SODIUM 40 MG: 40 TABLET ORAL at 17:13

## 2021-01-11 RX ADMIN — INSULIN GLARGINE 10 UNITS: 100 INJECTION, SOLUTION SUBCUTANEOUS at 13:00

## 2021-01-11 RX ADMIN — TAMOXIFEN CITRATE 20 MG: 10 TABLET ORAL at 17:15

## 2021-01-11 RX ADMIN — LOSARTAN POTASSIUM: 50 TABLET, FILM COATED ORAL at 10:45

## 2021-01-11 RX ADMIN — DILTIAZEM HYDROCHLORIDE 120 MG: 60 CAPSULE, EXTENDED RELEASE ORAL at 23:06

## 2021-01-11 RX ADMIN — HEPARIN SODIUM 5000 UNITS: 5000 INJECTION INTRAVENOUS; SUBCUTANEOUS at 23:05

## 2021-01-11 NOTE — ASSESSMENT & PLAN NOTE
Continue home medications Aricept and Namenda  Got significantly confused overnight and was trying to get out of bed    Patient is better this morning but is on continuous one-to-one observation  She is needing 4 point restraints and still is a little restless

## 2021-01-11 NOTE — PLAN OF CARE
Problem: Potential for Falls  Goal: Patient will remain free of falls  Description: INTERVENTIONS:  - Assess patient frequently for physical needs  -  Identify cognitive and physical deficits and behaviors that affect risk of falls    -  [place] fall precautions as indicated by assessment   - Educate patient/family on patient safety including physical limitations  - Instruct patient to call for assistance with activity based on assessment  - Modify environment to reduce risk of injury  - Consider OT/PT consult to assist with strengthening/mobility  Outcome: Progressing     Problem: PAIN - ADULT  Goal: Verbalizes/displays adequate comfort level or baseline comfort level  Description: Interventions:  - Encourage patient to monitor pain and request assistance  - Assess pain using appropriate pain scale  - Administer analgesics based on type and severity of pain and evaluate response  - Implement non-pharmacological measures as appropriate and evaluate response  - Consider cultural and social influences on pain and pain management  - Notify physician/advanced practitioner if interventions unsuccessful or patient reports new pain  Outcome: Progressing     Problem: INFECTION - ADULT  Goal: Absence or prevention of progression during hospitalization  Description: INTERVENTIONS:  - Assess and monitor for signs and symptoms of infection  - Monitor lab/diagnostic results  - Monitor all insertion sites, i e  indwelling lines, tubes, and drains  - Monitor endotracheal if appropriate and nasal secretions for changes in amount and color  - [place] appropriate cooling/warming therapies per order  - Administer medications as ordered  - Instruct and encourage patient and family to use good hand hygiene technique  - Identify and instruct in appropriate isolation precautions for identified infection/condition  Outcome: Progressing  Goal: Absence of fever/infection during neutropenic period  Description: INTERVENTIONS:  - Monitor WBC    Outcome: Progressing     Problem: RESPIRATORY - ADULT  Goal: Achieves optimal ventilation and oxygenation  Description: INTERVENTIONS:  - Assess for changes in respiratory status  - Assess for changes in mentation and behavior  - Position to facilitate oxygenation and minimize respiratory effort  - Oxygen administered by appropriate delivery if ordered  - Initiate smoking cessation education as indicated  - Encourage broncho-pulmonary hygiene including cough, deep breathe, Incentive Spirometry  - Assess the need for suctioning and aspirate as needed  - Assess and instruct to report SOB or any respiratory difficulty  - Respiratory Therapy support as indicated  Outcome: Progressing     Problem: METABOLIC, FLUID AND ELECTROLYTES - ADULT  Goal: Electrolytes maintained within normal limits  Description: INTERVENTIONS:  - Monitor labs and assess patient for signs and symptoms of electrolyte imbalances  - Administer electrolyte replacement as ordered  - Monitor response to electrolyte replacements, including repeat lab results as appropriate  - Instruct patient on fluid and nutrition as appropriate  Outcome: Progressing  Goal: Fluid balance maintained  Description: INTERVENTIONS:  - Monitor labs   - Monitor I/O and WT  - Instruct patient on fluid and nutrition as appropriate  - Assess for signs & symptoms of volume excess or deficit  Outcome: Progressing     Problem: HEMATOLOGIC - ADULT  Goal: Maintains hematologic stability  Description: INTERVENTIONS  - Assess for signs and symptoms of bleeding or hemorrhage  - Monitor labs  - Administer supportive blood products/factors as ordered and appropriate  Outcome: Progressing

## 2021-01-11 NOTE — ASSESSMENT & PLAN NOTE
Blood pressure appears controlled  Continue home medications losartan/hydrochlorothiazide, Cardizem  Continue to monitor blood pressure with holding parameters for the medications

## 2021-01-11 NOTE — PROGRESS NOTES
Progress Note - Ramona Confer [birth year], 69 y o  female MRN: [id number]    Unit/Bed#: -01 Encounter: [id number]    Primary Care Provider: Wendy Bay MD   Date and time admitted to hospital: 1/9/2021  3:20 PM        Acute encephalopathy  Assessment & Plan  Likely toxic metabolic in the setting of acute COVID-19 infection with underlying dementia  Patient appears slightly confused  Encourage reorientation and delirium precautions  Fall precautions  Patient on continues one-to-one observation along with restraints  These were put in because the patient was a threat to self  Will try to reduce the restrains when her confusion gets better  Acute respiratory failure with hypoxia (HCC)  Assessment & Plan  Currently on 2 liters of oxygen sating >92 percent  Continue oxygen supplementation p r n  Will reduce oxygen as able    History of breast cancer  Assessment & Plan  Continue home dose tamoxifen    Dementia ([facility] [address] )  Assessment & Plan  Continue home medications Aricept and Namenda  Got significantly confused overnight and was trying to get out of bed  Patient is better this morning but is on continuous one-to-one observation  She is needing 4 point restraints and still is a little restless    Hypertension  Assessment & Plan  Blood pressure appears controlled  Continue home medications losartan/hydrochlorothiazide, Cardizem  Continue to monitor blood pressure with holding parameters for the medications    * COVID-19  Assessment & Plan  Patient is on mild COVID-19 pathway  Continue iv remdesvir and Decadron   Continue oxygen supplementation      TE Pharmacologic Prophylaxis: Heparin    Patient Centered Rounds: I have performed bedside rounds with nursing staff today    Discussions with Specialists or Other Care Team Provider:  Spoke to 8 and nursing staff  Education and Discussions with Family / Patient:  Spoke to patient but this was limited as the patient was confused    Current Length of Stay: 2 day(s)  Current Patient Status: Inpatient     Certification Statement: The patient will continue to require additional inpatient hospital stay due to COVID-19  Discharge Plan / Estimated Discharge Date:  2-3 days    Code Status: Level 1 - Full Code  ______________________________________________________________________________    Subjective:   Patient seen and examined by me  Patient is confused and is not able to give any subjective complaints  Patient does not have any chest pain, palpitations or diaphoresis at this point of  No nausea or vomiting  Patient is confused and was trying to get out of the bed and overnight needed to be put on restraints and continues one-to-one observation    Objective:   Vitals: Blood pressure (!) 179/75, pulse 75, temperature 98 6 °F (37 °C), resp  rate 18, height 5' 3" (1 6 m), weight 90 3 kg (199 lb 1 2 oz), SpO2 90 %      Physical Exam:   General appearance: alert, distracted, appears stated age and cooperative, looks a little weak  HEENT - atraumatic and normocephalic  Neck- supple  Skin - no fresh rash  Extremities no fresh focal deformities  Cardiovascular- No visible JVD  Respiratory- no accessory muscles of respiration being used  Skin - no fresh rash  Abdomen - no visible mass  CNS- No fresh focal deficits  Psych- no acute psychosis      Additional Data:   Labs:  Results from last 7 days   Lab Units 01/11/21  0522 01/10/21  0500 01/09/21  1545   WBC Thousand/uL 7 65 11 83* 16 85*   HEMOGLOBIN g/dL 12 7 13 0 13 3   HEMATOCRIT % 38 1 38 8 39 7   MCV fL 102* 102* 102*   TOTAL NEUT ABS Thousand/uL  --   --  15 50*   BANDS PCT %  --   --  3   PLATELETS Thousands/uL 168 162 189     Results from last 7 days   Lab Units 01/11/21  0522 01/10/21  0500 01/09/21  1545   SODIUM mmol/L 135* 134* 136   POTASSIUM mmol/L 3 7 4 4 4 4   CHLORIDE mmol/L 101 101 101   CO2 mmol/L 25 24 29   ANION GAP mmol/L 9 9 6   BUN mg/dL 28* 28* 24   CREATININE mg/dL 1 14 1 21 1 16   CALCIUM mg/dL 8 4 8 1* 8  4   ALBUMIN g/dL 2 6*  --  2 5*   TOTAL BILIRUBIN mg/dL 0 50  --  0 40   ALK PHOS U/L 51  --  55   ALT U/L 59  --  53   AST U/L 46*  --  32   EGFR ml/min/1 73sq m 48 45 47   GLUCOSE RANDOM mg/dL 227* 359* 219*         Results from last 7 days   Lab Units 01/09/21  1545   TROPONIN I ng/mL <0 02              Results from last 7 days   Lab Units 01/11/21  0759 01/10/21  2122 01/10/21  1626 01/10/21  1419   POC GLUCOSE mg/dl 222* 157* 181* 254*             * I Have Reviewed All Lab Data Listed Above  Cultures:   Results from last 7 days   Lab Units 01/09/21  1545   INFLUENZA A PCR  Negative     Results from last 7 days   Lab Units 01/09/21  1545   INFLUENZA A PCR  Negative   INFLUENZA B PCR  Negative   RSV PCR  Negative         Imaging:  Imaging Reports Reviewed Today Include:   Xr Chest 1 View Portable    Result Date: 1/10/2021  Impression: Mild patchy groundglass density in the right mid to lower lung zone may represent developing infiltrate  In the setting of clinically suspected/proven COVID-19, this plain film appearance while nonspecific, can be seen in cases of viral pneumonia such as  COVID-19  The study was marked in [facility] for immediate notification   Workstation performed: [id number]     Scheduled Meds:  Current Facility-Administered Medications   Medication Dose Route Frequency Provider Last Rate    acetaminophen  650 mg Oral Q6H PRN Steve Gupta MD      ascorbic acid  1,000 mg Oral Q12H Gerber Hogan MD      aspirin  81 mg Oral Daily Steve Gupta MD      cholecalciferol  2,000 Units Oral Daily Steve Gupta MD      dexamethasone  6 mg Intravenous Q24H Steve Gupta MD      diltiazem  120 mg Oral Q12H Gerber Hogan MD      donepezil  10 mg Oral HS Steve Gupta MD      heparin (porcine)  5,000 Units Subcutaneous Q8H [address] Steve Gupta MD      insulin glargine  10 Units Subcutaneous Daily With Lunch Ruben Kussmaul, MD      insulin lispro  1-5 Units Subcutaneous TID AC Mindi Corona MD      insulin lispro  1-5 Units Subcutaneous HS Mindi Corona MD      levothyroxine  137 mcg Oral Early Morning Ching Gil MD      losartan potassium-hydrochlorothiazide (HYZAAR 100/12  5) combo dose   Oral Daily Ching Gil MD      memantine  10 mg Oral BID Ching Gil MD      zinc sulfate  220 mg Oral Daily Ching iGl MD      Followed by   Mac Jay ON 1/17/2021] multivitamin-minerals  1 tablet Oral Daily Ching Gil MD      ondansetron  4 mg Intravenous Q6H PRN Ching Gil MD      pravastatin  40 mg Oral Daily With Po Aguirre MD      remdesivir  100 mg Intravenous Q24H Ching Gil MD      tamoxifen  20 mg Oral BID MD Mindi Farfan MD  [facility]    ** Please Note: This note has been constructed using a voice recognition system   **

## 2021-01-11 NOTE — ASSESSMENT & PLAN NOTE
Likely toxic metabolic in the setting of acute COVID-19 infection with underlying dementia  Patient appears slightly confused  Encourage reorientation and delirium precautions  Fall precautions  Patient on continues one-to-one observation along with restraints  These were put in because the patient was a threat to self  Will try to reduce the restrains when her confusion gets better

## 2021-01-11 NOTE — ASSESSMENT & PLAN NOTE
Currently on 2 liters of oxygen sating >92 percent  Continue oxygen supplementation p r n    Will reduce oxygen as able

## 2021-01-12 DIAGNOSIS — Z71.89 COMPLEX CARE COORDINATION: Primary | ICD-10-CM

## 2021-01-12 LAB
FERRITIN SERPL-MCNC: 171 NG/ML (ref 8–388)
GLUCOSE SERPL-MCNC: 197 MG/DL (ref 65–140)
GLUCOSE SERPL-MCNC: 226 MG/DL (ref 65–140)
GLUCOSE SERPL-MCNC: 278 MG/DL (ref 65–140)
GLUCOSE SERPL-MCNC: 315 MG/DL (ref 65–140)
PROCALCITONIN SERPL-MCNC: <0.05 NG/ML

## 2021-01-12 PROCEDURE — 99232 SBSQ HOSP IP/OBS MODERATE 35: CPT | Performed by: STUDENT IN AN ORGANIZED HEALTH CARE EDUCATION/TRAINING PROGRAM

## 2021-01-12 PROCEDURE — 84145 PROCALCITONIN (PCT): CPT | Performed by: INTERNAL MEDICINE

## 2021-01-12 PROCEDURE — 82948 REAGENT STRIP/BLOOD GLUCOSE: CPT

## 2021-01-12 RX ORDER — INSULIN GLARGINE 100 [IU]/ML
15 INJECTION, SOLUTION SUBCUTANEOUS
Status: DISCONTINUED | OUTPATIENT
Start: 2021-01-13 | End: 2021-01-15

## 2021-01-12 RX ORDER — LANOLIN ALCOHOL/MO/W.PET/CERES
6 CREAM (GRAM) TOPICAL
Status: DISCONTINUED | OUTPATIENT
Start: 2021-01-12 | End: 2021-01-20 | Stop reason: HOSPADM

## 2021-01-12 RX ADMIN — DILTIAZEM HYDROCHLORIDE 120 MG: 60 CAPSULE, EXTENDED RELEASE ORAL at 09:12

## 2021-01-12 RX ADMIN — REMDESIVIR 100 MG: 100 INJECTION, POWDER, LYOPHILIZED, FOR SOLUTION INTRAVENOUS at 23:18

## 2021-01-12 RX ADMIN — TAMOXIFEN CITRATE 20 MG: 10 TABLET ORAL at 17:43

## 2021-01-12 RX ADMIN — HEPARIN SODIUM 5000 UNITS: 5000 INJECTION INTRAVENOUS; SUBCUTANEOUS at 06:58

## 2021-01-12 RX ADMIN — MEMANTINE 10 MG: 10 TABLET ORAL at 17:44

## 2021-01-12 RX ADMIN — ASPIRIN 81 MG: 81 TABLET, COATED ORAL at 09:11

## 2021-01-12 RX ADMIN — INSULIN LISPRO 2 UNITS: 100 INJECTION, SOLUTION INTRAVENOUS; SUBCUTANEOUS at 09:08

## 2021-01-12 RX ADMIN — MEMANTINE 10 MG: 10 TABLET ORAL at 09:11

## 2021-01-12 RX ADMIN — OXYCODONE HYDROCHLORIDE AND ACETAMINOPHEN 1000 MG: 500 TABLET ORAL at 09:11

## 2021-01-12 RX ADMIN — DILTIAZEM HYDROCHLORIDE 120 MG: 60 CAPSULE, EXTENDED RELEASE ORAL at 20:40

## 2021-01-12 RX ADMIN — ZINC SULFATE 220 MG (50 MG) CAPSULE 220 MG: CAPSULE at 09:11

## 2021-01-12 RX ADMIN — INSULIN GLARGINE 10 UNITS: 100 INJECTION, SOLUTION SUBCUTANEOUS at 13:10

## 2021-01-12 RX ADMIN — INSULIN LISPRO 3 UNITS: 100 INJECTION, SOLUTION INTRAVENOUS; SUBCUTANEOUS at 13:13

## 2021-01-12 RX ADMIN — PRAVASTATIN SODIUM 40 MG: 40 TABLET ORAL at 17:43

## 2021-01-12 RX ADMIN — DONEPEZIL HYDROCHLORIDE 10 MG: 5 TABLET ORAL at 22:16

## 2021-01-12 RX ADMIN — INSULIN LISPRO 2 UNITS: 100 INJECTION, SOLUTION INTRAVENOUS; SUBCUTANEOUS at 17:46

## 2021-01-12 RX ADMIN — MELATONIN 6 MG: at 22:16

## 2021-01-12 RX ADMIN — HEPARIN SODIUM 5000 UNITS: 5000 INJECTION INTRAVENOUS; SUBCUTANEOUS at 22:16

## 2021-01-12 RX ADMIN — DEXAMETHASONE SODIUM PHOSPHATE 6 MG: 4 INJECTION INTRA-ARTICULAR; INTRALESIONAL; INTRAMUSCULAR; INTRAVENOUS; SOFT TISSUE at 20:33

## 2021-01-12 RX ADMIN — TAMOXIFEN CITRATE 20 MG: 10 TABLET ORAL at 09:13

## 2021-01-12 RX ADMIN — OXYCODONE HYDROCHLORIDE AND ACETAMINOPHEN 1000 MG: 500 TABLET ORAL at 20:39

## 2021-01-12 RX ADMIN — Medication 2000 UNITS: at 09:11

## 2021-01-12 RX ADMIN — LEVOTHYROXINE SODIUM 137 MCG: 25 TABLET ORAL at 06:58

## 2021-01-12 RX ADMIN — LOSARTAN POTASSIUM: 50 TABLET, FILM COATED ORAL at 09:11

## 2021-01-12 RX ADMIN — HEPARIN SODIUM 5000 UNITS: 5000 INJECTION INTRAVENOUS; SUBCUTANEOUS at 16:15

## 2021-01-12 RX ADMIN — INSULIN LISPRO 1 UNITS: 100 INJECTION, SOLUTION INTRAVENOUS; SUBCUTANEOUS at 22:20

## 2021-01-12 NOTE — ASSESSMENT & PLAN NOTE
91- 93% on 3 L nasal cannula  Plan as stated above under COVID-19  Continue oxygen supplementation p r n    Will reduce oxygen as able

## 2021-01-12 NOTE — ASSESSMENT & PLAN NOTE
Patient is on mild COVID-19 pathway  Continue iv remdesvir and Decadron   Continue oxygen supplementation  Encouraged incentive spirometry use, encouraged self proning and ambulation as able

## 2021-01-12 NOTE — ASSESSMENT & PLAN NOTE
Continue home medications Aricept and Namenda  Earlier patient had episode of agitated behavior requiring restrain and close one-to-one observation  Currently awake, alert, oriented self, place, time  Intermittent forgetfulness requiring reorientation  Discontinue restrain and close observation for now  Encouraged good sleep hygiene

## 2021-01-12 NOTE — CASE MANAGEMENT
LOS: 3    CM received cb from pt's daughter who reports she would prefer to be called as she is poa and her parents both have dementia  Per pt's daughter she and family all have covid  Per pt's daughter she will bring in poa documentation when well  Pt lives in [place] with her daughter and family  Per pt's daughter pt lives in [place] that has 2 [address] with railings  Pt denies hx of dme and completes adl's with assistance  Pt denies hx of rehab, hhc, and sa  Pt's pcp is Markie  Pt uses [place] [facility]  Pt's daughter is poa  Pt takes medication from depression as prescribed by pcp  Pt is retired and does not drive  Pt's family transports  CM reviewed discharge planning process including the following: identifying help at home, patient preference for discharge planning needs, pharmacy preference, and availability of treatment team to discuss questions or concerns patient and/or family may have regarding understanding medications and recognizing signs and symptoms once discharged  CM also encouraged patient to follow up with all recommended appointments after discharge  Patient advised of importance for patient and family to participate in managing patients medical well being  CM discussed dcp and pt's current status on restraints and 1:1  Per pt's daughter pt usually only is upset with her otherwise manageable  Pt's daughter reports she won't be calling pt either because she doesn't want to escalate her  Pt's daughter states plan is for her to bring pt home  Pt's daughter is working on setting up private aides as parents have [address]  Pt's daughter left messages for various agencies  Pt's daughter was grateful that CM offered and provided additional resources  Pt's daughter will pick pt up  CM suggested slvna and op cm; pt's daughter was thankful for the service and suggestion  Pt's daughter had no other questions at this time  CM sent referral for op cm and slvna  CM Dept to follow pt through dc

## 2021-01-12 NOTE — ASSESSMENT & PLAN NOTE
Likely toxic metabolic in the setting of acute COVID-19 infection with underlying dementia  Patient appears slightly confused  Encourage reorientation and delirium precautions  Fall precautions  Resolved

## 2021-01-12 NOTE — PROGRESS NOTES
Progress Note - Santa Mckeeite [birth year], 69 y o  female MRN: [id number]    Unit/Bed#: -01 Encounter: [id number]    Primary Care Provider: Aruna Soni MD   Date and time admitted to hospital: 1/9/2021  3:20 PM        * COVID-19  Assessment & Plan  Patient is on mild COVID-19 pathway  Continue iv remdesvir and Decadron   Continue oxygen supplementation  Encouraged incentive spirometry use, encouraged self proning and ambulation as able  Acute respiratory failure with hypoxia (HCC)  Assessment & Plan  91- 93% on 3 L nasal cannula  Plan as stated above under COVID-19  Continue oxygen supplementation p r n  Will reduce oxygen as able    Acute encephalopathy  Assessment & Plan  Likely toxic metabolic in the setting of acute COVID-19 infection with underlying dementia  Patient appears slightly confused  Encourage reorientation and delirium precautions  Fall precautions  Resolved  History of breast cancer  Assessment & Plan  Continue home dose tamoxifen    Dementia [facility])  Assessment & Plan  Continue home medications Aricept and Namenda  Earlier patient had episode of agitated behavior requiring restrain and close one-to-one observation  Currently awake, alert, oriented self, place, time  Intermittent forgetfulness requiring reorientation  Discontinue restrain and close observation for now  Encouraged good sleep hygiene  Hypertension  Assessment & Plan  Blood pressure appears controlled  Continue home medications losartan/hydrochlorothiazide, Cardizem  Continue to monitor blood pressure with holding parameters for the medications      VTE Pharmacologic Prophylaxis:   Pharmacologic: Heparin    Patient Centered Rounds: I have performed bedside rounds with nursing staff today  Education and Discussions with Family / Patient: will call family tomorrow  Time Spent for Care: 30 minutes  More than 50% of total time spent on counseling and coordination of care as described above      Current Length of Stay: 3 day(s)    Current Patient Status: Inpatient   Certification Statement: The patient will continue to require additional inpatient hospital stay due to above  Discharge Plan: TBD    Code Status: Level 1 - Full Code      Subjective:   Afebrile, hemodynamically stable  Seen ambulating in her room  All currently O2 saturation 91-93% on 3 L nasal cannula  Awake, alert, oriented to place, person, time  Forgetful at times  Currently present  Denies any complaints at this time  Objective:     Vitals:   Temp (24hrs), Av °F (36 7 °C), Min:97 3 °F (36 3 °C), Max:98 4 °F (36 9 °C)    Temp:  [97 3 °F (36 3 °C)-98 4 °F (36 9 °C)] 97 3 °F (36 3 °C)  HR:  [49-66] 66  Resp:  [16-18] 18  BP: ()/(62-77) 99/62  SpO2:  [91 %-95 %] 92 %  Body mass index is 35 15 kg/m²  Input and Output Summary (last 24 hours): Intake/Output Summary (Last 24 hours) at 2021 1723  Last data filed at 2021 1500  Gross per 24 hour   Intake 1350 ml   Output 1300 ml   Net 50 ml       Physical Exam:     Physical Exam  Constitutional:       General: She is not in acute distress  Appearance: Normal appearance  She is not ill-appearing  HENT:      Head: Normocephalic and atraumatic  Mouth/Throat:      Pharynx: Oropharynx is clear  Eyes:      Extraocular Movements: Extraocular movements intact  Neck:      Musculoskeletal: Normal range of motion and neck supple  Cardiovascular:      Rate and Rhythm: Normal rate and regular rhythm  Pulses: Normal pulses  Heart sounds: Normal heart sounds  Pulmonary:      Effort: Pulmonary effort is normal  No respiratory distress  Breath sounds: Normal breath sounds  Abdominal:      General: Bowel sounds are normal  There is no distension  Palpations: Abdomen is soft  Musculoskeletal: Normal range of motion  General: No swelling  Skin:     Findings: No rash  Neurological:      General: No focal deficit present        Mental Status: She is alert  Comments: Patient is awake, alert, oriented to person, place, time  Forgetful at times requiring reorientation  Psychiatric:         Mood and Affect: Mood normal          Behavior: Behavior normal            Additional Data:     Labs:    Results from last 7 days   Lab Units 01/11/21  0522  01/09/21  1545   WBC Thousand/uL 7 65   < > 16 85*   HEMOGLOBIN g/dL 12 7   < > 13 3   HEMATOCRIT % 38 1   < > 39 7   PLATELETS Thousands/uL 168   < > 189   BANDS PCT %  --   --  3   LYMPHO PCT %  --   --  3*   MONO PCT %  --   --  4   EOS PCT %  --   --  0    < > = values in this interval not displayed  Results from last 7 days   Lab Units 01/11/21  0522   SODIUM mmol/L 135*   POTASSIUM mmol/L 3 7   CHLORIDE mmol/L 101   CO2 mmol/L 25   BUN mg/dL 28*   CREATININE mg/dL 1 14   ANION GAP mmol/L 9   CALCIUM mg/dL 8 4   ALBUMIN g/dL 2 6*   TOTAL BILIRUBIN mg/dL 0 50   ALK PHOS U/L 51   ALT U/L 59   AST U/L 46*   GLUCOSE RANDOM mg/dL 227*         Results from last 7 days   Lab Units 01/12/21  1548 01/12/21  1132 01/12/21  0623 01/11/21  2100 01/11/21  1634 01/11/21  1240 01/11/21  0759 01/10/21  2122 01/10/21  1626 01/10/21  1419   POC GLUCOSE mg/dl 278* 315* 226* 191* 152* 156* 222* 157* 181* 254*         Results from last 7 days   Lab Units 01/12/21  0441 01/11/21  1344   PROCALCITONIN ng/ml <0 05 0 06           * I Have Reviewed All Lab Data Listed Above  * Additional Pertinent Lab Tests Reviewed:  All Labs Within Last 24 Hours Reviewed      Recent Cultures (last 7 days):           Last 24 Hours Medication List:   Current Facility-Administered Medications   Medication Dose Route Frequency Provider Last Rate    acetaminophen  650 mg Oral Q6H PRN Stella Paul MD      ascorbic acid  1,000 mg Oral Q12H King Dhillon MD      aspirin  81 mg Oral Daily Stella Paul MD      cholecalciferol  2,000 Units Oral Daily Stella Paul MD      dexamethasone  6 mg Intravenous Q24H Louisa Biju Hernandez MD      diltiazem  120 mg Oral Q12H Pallavi Amin MD      donepezil  10 mg Oral HS Derrek Sal MD      heparin (porcine)  5,000 Units Subcutaneous Q8H [address] Derrek Sal MD      insulin glargine  10 Units Subcutaneous Daily With Lunch Denton Solis MD      insulin lispro  1-5 Units Subcutaneous TID AC Jalil Neil MD      insulin lispro  1-5 Units Subcutaneous HS Jalil Neil MD     [facility] levothyroxine  137 mcg Oral Early Morning Derrek Sal MD      losartan potassium-hydrochlorothiazide (HYZAAR 100/12  5) combo dose   Oral Daily Derrek Sal MD      memantine  10 mg Oral BID Derrek Sal MD      zinc sulfate  220 mg Oral Daily Derrek Sal MD      Followed by   Renee Zhang ON 1/17/2021] multivitamin-minerals  1 tablet Oral Daily Derrek Sal MD      OLANZapine  2 5 mg Intramuscular Q3H PRN Jalil Neil MD      ondansetron  4 mg Intravenous Q6H PRN Derrek Sal MD      pravastatin  40 mg Oral Daily With Mariaelena Davis MD      remdesivir  100 mg Intravenous Q24H Derrek Sal MD      tamoxifen  20 mg Oral BID Derrek Sal MD          Today, Patient Was Seen By: Harsh Crfat MD    ** Please Note: Dictation voice to text software may have been used in the creation of this document   **

## 2021-01-13 LAB
ALBUMIN SERPL BCP-MCNC: 2.3 G/DL (ref 3.5–5)
ALP SERPL-CCNC: 51 U/L (ref 46–116)
ALT SERPL W P-5'-P-CCNC: 81 U/L (ref 12–78)
ANION GAP SERPL CALCULATED.3IONS-SCNC: 8 MMOL/L (ref 4–13)
AST SERPL W P-5'-P-CCNC: 50 U/L (ref 5–45)
BASOPHILS # BLD AUTO: 0.01 THOUSANDS/ΜL (ref 0–0.1)
BASOPHILS NFR BLD AUTO: 0 % (ref 0–1)
BILIRUB SERPL-MCNC: 0.4 MG/DL (ref 0.2–1)
BUN SERPL-MCNC: 33 MG/DL (ref 5–25)
CALCIUM ALBUM COR SERPL-MCNC: 9.7 MG/DL (ref 8.3–10.1)
CALCIUM SERPL-MCNC: 8.3 MG/DL (ref 8.3–10.1)
CHLORIDE SERPL-SCNC: 104 MMOL/L (ref 100–108)
CO2 SERPL-SCNC: 24 MMOL/L (ref 21–32)
CREAT SERPL-MCNC: 1.23 MG/DL (ref 0.6–1.3)
CRP SERPL QL: 11.9 MG/L
EOSINOPHIL # BLD AUTO: 0 THOUSAND/ΜL (ref 0–0.61)
EOSINOPHIL NFR BLD AUTO: 0 % (ref 0–6)
ERYTHROCYTE [DISTWIDTH] IN BLOOD BY AUTOMATED COUNT: 12.9 % (ref 11.6–15.1)
GFR SERPL CREATININE-BSD FRML MDRD: 44 ML/MIN/1.73SQ M
GLUCOSE SERPL-MCNC: 204 MG/DL (ref 65–140)
GLUCOSE SERPL-MCNC: 205 MG/DL (ref 65–140)
GLUCOSE SERPL-MCNC: 242 MG/DL (ref 65–140)
GLUCOSE SERPL-MCNC: 245 MG/DL (ref 65–140)
GLUCOSE SERPL-MCNC: 265 MG/DL (ref 65–140)
HCT VFR BLD AUTO: 41.3 % (ref 34.8–46.1)
HGB BLD-MCNC: 13.7 G/DL (ref 11.5–15.4)
IMM GRANULOCYTES # BLD AUTO: 0.11 THOUSAND/UL (ref 0–0.2)
IMM GRANULOCYTES NFR BLD AUTO: 1 % (ref 0–2)
LYMPHOCYTES # BLD AUTO: 1.69 THOUSANDS/ΜL (ref 0.6–4.47)
LYMPHOCYTES NFR BLD AUTO: 14 % (ref 14–44)
MCH RBC QN AUTO: 34 PG (ref 26.8–34.3)
MCHC RBC AUTO-ENTMCNC: 33.2 G/DL (ref 31.4–37.4)
MCV RBC AUTO: 103 FL (ref 82–98)
MONOCYTES # BLD AUTO: 0.18 THOUSAND/ΜL (ref 0.17–1.22)
MONOCYTES NFR BLD AUTO: 2 % (ref 4–12)
NEUTROPHILS # BLD AUTO: 9.94 THOUSANDS/ΜL (ref 1.85–7.62)
NEUTS SEG NFR BLD AUTO: 83 % (ref 43–75)
NRBC BLD AUTO-RTO: 0 /100 WBCS
PLATELET # BLD AUTO: 165 THOUSANDS/UL (ref 149–390)
PMV BLD AUTO: 9.2 FL (ref 8.9–12.7)
POTASSIUM SERPL-SCNC: 4.4 MMOL/L (ref 3.5–5.3)
PROT SERPL-MCNC: 5.8 G/DL (ref 6.4–8.2)
RBC # BLD AUTO: 4.03 MILLION/UL (ref 3.81–5.12)
SODIUM SERPL-SCNC: 136 MMOL/L (ref 136–145)
WBC # BLD AUTO: 11.93 THOUSAND/UL (ref 4.31–10.16)

## 2021-01-13 PROCEDURE — 80053 COMPREHEN METABOLIC PANEL: CPT | Performed by: STUDENT IN AN ORGANIZED HEALTH CARE EDUCATION/TRAINING PROGRAM

## 2021-01-13 PROCEDURE — 99232 SBSQ HOSP IP/OBS MODERATE 35: CPT | Performed by: STUDENT IN AN ORGANIZED HEALTH CARE EDUCATION/TRAINING PROGRAM

## 2021-01-13 PROCEDURE — 82948 REAGENT STRIP/BLOOD GLUCOSE: CPT

## 2021-01-13 PROCEDURE — 86140 C-REACTIVE PROTEIN: CPT | Performed by: STUDENT IN AN ORGANIZED HEALTH CARE EDUCATION/TRAINING PROGRAM

## 2021-01-13 PROCEDURE — 85025 COMPLETE CBC W/AUTO DIFF WBC: CPT | Performed by: STUDENT IN AN ORGANIZED HEALTH CARE EDUCATION/TRAINING PROGRAM

## 2021-01-13 RX ORDER — HYDROCORTISONE 25 MG/G
CREAM TOPICAL 2 TIMES DAILY
Status: DISCONTINUED | OUTPATIENT
Start: 2021-01-13 | End: 2021-01-20 | Stop reason: HOSPADM

## 2021-01-13 RX ADMIN — INSULIN LISPRO 1 UNITS: 100 INJECTION, SOLUTION INTRAVENOUS; SUBCUTANEOUS at 09:06

## 2021-01-13 RX ADMIN — HEPARIN SODIUM 5000 UNITS: 5000 INJECTION INTRAVENOUS; SUBCUTANEOUS at 06:34

## 2021-01-13 RX ADMIN — MEMANTINE 10 MG: 10 TABLET ORAL at 17:51

## 2021-01-13 RX ADMIN — INSULIN LISPRO 1 UNITS: 100 INJECTION, SOLUTION INTRAVENOUS; SUBCUTANEOUS at 12:04

## 2021-01-13 RX ADMIN — INSULIN LISPRO 2 UNITS: 100 INJECTION, SOLUTION INTRAVENOUS; SUBCUTANEOUS at 17:53

## 2021-01-13 RX ADMIN — HYDROCORTISONE: 25 CREAM TOPICAL at 15:16

## 2021-01-13 RX ADMIN — INSULIN GLARGINE 15 UNITS: 100 INJECTION, SOLUTION SUBCUTANEOUS at 12:03

## 2021-01-13 RX ADMIN — ZINC SULFATE 220 MG (50 MG) CAPSULE 220 MG: CAPSULE at 09:10

## 2021-01-13 RX ADMIN — MELATONIN 6 MG: at 21:26

## 2021-01-13 RX ADMIN — REMDESIVIR 100 MG: 100 INJECTION, POWDER, LYOPHILIZED, FOR SOLUTION INTRAVENOUS at 20:22

## 2021-01-13 RX ADMIN — HEPARIN SODIUM 5000 UNITS: 5000 INJECTION INTRAVENOUS; SUBCUTANEOUS at 15:13

## 2021-01-13 RX ADMIN — LOSARTAN POTASSIUM: 50 TABLET, FILM COATED ORAL at 09:08

## 2021-01-13 RX ADMIN — DONEPEZIL HYDROCHLORIDE 10 MG: 5 TABLET ORAL at 21:26

## 2021-01-13 RX ADMIN — OXYCODONE HYDROCHLORIDE AND ACETAMINOPHEN 1000 MG: 500 TABLET ORAL at 09:10

## 2021-01-13 RX ADMIN — DILTIAZEM HYDROCHLORIDE 120 MG: 60 CAPSULE, EXTENDED RELEASE ORAL at 09:11

## 2021-01-13 RX ADMIN — PRAVASTATIN SODIUM 40 MG: 40 TABLET ORAL at 17:51

## 2021-01-13 RX ADMIN — INSULIN LISPRO 2 UNITS: 100 INJECTION, SOLUTION INTRAVENOUS; SUBCUTANEOUS at 21:27

## 2021-01-13 RX ADMIN — HEPARIN SODIUM 5000 UNITS: 5000 INJECTION INTRAVENOUS; SUBCUTANEOUS at 21:26

## 2021-01-13 RX ADMIN — TAMOXIFEN CITRATE 20 MG: 10 TABLET ORAL at 17:52

## 2021-01-13 RX ADMIN — DILTIAZEM HYDROCHLORIDE 120 MG: 60 CAPSULE, EXTENDED RELEASE ORAL at 20:25

## 2021-01-13 RX ADMIN — Medication 2000 UNITS: at 09:08

## 2021-01-13 RX ADMIN — MEMANTINE 10 MG: 10 TABLET ORAL at 09:10

## 2021-01-13 RX ADMIN — HYDROCORTISONE: 25 CREAM TOPICAL at 17:51

## 2021-01-13 RX ADMIN — TAMOXIFEN CITRATE 20 MG: 10 TABLET ORAL at 09:11

## 2021-01-13 RX ADMIN — OXYCODONE HYDROCHLORIDE AND ACETAMINOPHEN 1000 MG: 500 TABLET ORAL at 20:23

## 2021-01-13 RX ADMIN — LEVOTHYROXINE SODIUM 137 MCG: 25 TABLET ORAL at 06:34

## 2021-01-13 RX ADMIN — DEXAMETHASONE SODIUM PHOSPHATE 6 MG: 4 INJECTION INTRA-ARTICULAR; INTRALESIONAL; INTRAMUSCULAR; INTRAVENOUS; SOFT TISSUE at 20:23

## 2021-01-13 RX ADMIN — ASPIRIN 81 MG: 81 TABLET, COATED ORAL at 09:10

## 2021-01-13 NOTE — PLAN OF CARE
Problem: Potential for Falls  Goal: Patient will remain free of falls  Description: INTERVENTIONS:  - Assess patient frequently for physical needs  -  Identify cognitive and physical deficits and behaviors that affect risk of falls    -  [place] fall precautions as indicated by assessment   - Educate patient/family on patient safety including physical limitations  - Instruct patient to call for assistance with activity based on assessment  - Modify environment to reduce risk of injury  - Consider OT/PT consult to assist with strengthening/mobility  Outcome: Progressing     Problem: PAIN - ADULT  Goal: Verbalizes/displays adequate comfort level or baseline comfort level  Description: Interventions:  - Encourage patient to monitor pain and request assistance  - Assess pain using appropriate pain scale  - Administer analgesics based on type and severity of pain and evaluate response  - Implement non-pharmacological measures as appropriate and evaluate response  - Consider cultural and social influences on pain and pain management  - Notify physician/advanced practitioner if interventions unsuccessful or patient reports new pain  Outcome: Progressing     Problem: INFECTION - ADULT  Goal: Absence or prevention of progression during hospitalization  Description: INTERVENTIONS:  - Assess and monitor for signs and symptoms of infection  - Monitor lab/diagnostic results  - Monitor all insertion sites, i e  indwelling lines, tubes, and drains  - Monitor endotracheal if appropriate and nasal secretions for changes in amount and color  - [place] appropriate cooling/warming therapies per order  - Administer medications as ordered  - Instruct and encourage patient and family to use good hand hygiene technique  - Identify and instruct in appropriate isolation precautions for identified infection/condition  Outcome: Progressing  Goal: Absence of fever/infection during neutropenic period  Description: INTERVENTIONS:  - Monitor WBC    Outcome: Progressing     Problem: RESPIRATORY - ADULT  Goal: Achieves optimal ventilation and oxygenation  Description: INTERVENTIONS:  - Assess for changes in respiratory status  - Assess for changes in mentation and behavior  - Position to facilitate oxygenation and minimize respiratory effort  - Oxygen administered by appropriate delivery if ordered  - Initiate smoking cessation education as indicated  - Encourage broncho-pulmonary hygiene including cough, deep breathe, Incentive Spirometry  - Assess the need for suctioning and aspirate as needed  - Assess and instruct to report SOB or any respiratory difficulty  - Respiratory Therapy support as indicated  Outcome: Progressing     Problem: METABOLIC, FLUID AND ELECTROLYTES - ADULT  Goal: Electrolytes maintained within normal limits  Description: INTERVENTIONS:  - Monitor labs and assess patient for signs and symptoms of electrolyte imbalances  - Administer electrolyte replacement as ordered  - Monitor response to electrolyte replacements, including repeat lab results as appropriate  - Instruct patient on fluid and nutrition as appropriate  Outcome: Progressing  Goal: Fluid balance maintained  Description: INTERVENTIONS:  - Monitor labs   - Monitor I/O and WT  - Instruct patient on fluid and nutrition as appropriate  - Assess for signs & symptoms of volume excess or deficit  Outcome: Progressing     Problem: HEMATOLOGIC - ADULT  Goal: Maintains hematologic stability  Description: INTERVENTIONS  - Assess for signs and symptoms of bleeding or hemorrhage  - Monitor labs  - Administer supportive blood products/factors as ordered and appropriate  Outcome: Progressing     Problem: Nutrition/Hydration-ADULT  Goal: Nutrient/Hydration intake appropriate for improving, restoring or maintaining nutritional needs  Description: Monitor and assess patient's nutrition/hydration status for malnutrition   Collaborate with interdisciplinary team and initiate plan and interventions as ordered  Monitor patient's weight and dietary intake as ordered or per policy  Utilize nutrition screening tool and intervene as necessary  Determine patient's food preferences and provide high-protein, high-caloric foods as appropriate       INTERVENTIONS:  - Monitor oral intake, urinary output, labs, and treatment plans  - Assess nutrition and hydration status and recommend course of action  - Evaluate amount of meals eaten  - Assist patient with eating if necessary   - Allow adequate time for meals  - Recommend/ encourage appropriate diets, oral nutritional supplements, and vitamin/mineral supplements  - Order, calculate, and assess calorie counts as needed  - Assess need for intravenous fluids  - Provide nutrition/hydration education as appropriate  - Include patient/family/caregiver in decisions related to nutrition  Outcome: Progressing

## 2021-01-13 NOTE — ASSESSMENT & PLAN NOTE
Patient is on mild COVID-19 pathway  Patient has been tolerating IV remdesivir and recommendation well  Noted with slightly elevated AST ALT and will continue to monitor CMP  Continue oxygen supplementation  Encouraged incentive spirometry use, encouraged self proning and ambulation as able

## 2021-01-13 NOTE — ASSESSMENT & PLAN NOTE
Improving  Currently 93-96% on 3 L nasal cannula  Plan as stated above under COVID-19  Continue oxygen supplementation p r n    Will reduce oxygen as able

## 2021-01-13 NOTE — PROGRESS NOTES
Progress Note - He  [birth year], 69 y o  female MRN: [id number]    Unit/Bed#: -01 Encounter: [id number]    Primary Care Provider: Ezequiel Julien MD   Date and time admitted to hospital: 1/9/2021  3:20 PM        * COVID-19  Assessment & Plan  Patient is on mild COVID-19 pathway  Patient has been tolerating IV remdesivir and recommendation well  Noted with slightly elevated AST ALT and will continue to monitor CMP  Continue oxygen supplementation  Encouraged incentive spirometry use, encouraged self proning and ambulation as able  Acute respiratory failure with hypoxia (HCC)  Assessment & Plan  Improving  Currently 93-96% on 3 L nasal cannula  Plan as stated above under COVID-19  Continue oxygen supplementation p r n  Will reduce oxygen as able    Acute encephalopathy  Assessment & Plan  Likely toxic metabolic in the setting of acute COVID-19 infection with underlying dementia  Patient appears slightly confused  Encourage reorientation and delirium precautions  Fall precautions  Resolved  History of breast cancer  Assessment & Plan  Continue home dose tamoxifen    Dementia [facility])  Assessment & Plan  Continue home medications Aricept and Namenda  Earlier patient had episode of agitated behavior requiring restrain and close one-to-one observation  Currently awake, alert, oriented self, place, time  Intermittent forgetfulness requiring reorientation  Discontinue restrain and close observation for now  Encouraged good sleep hygiene  Hypertension  Assessment & Plan  Blood pressure appears controlled  Continue home medications losartan/hydrochlorothiazide, Cardizem  Continue to monitor blood pressure with holding parameters for the medications      VTE Pharmacologic Prophylaxis:   Pharmacologic: Heparin    Patient Centered Rounds: I have performed bedside rounds with nursing staff today       Education and Discussions with Family / Patient: Spoke with Daughter Eros Mcclure over the phone at length  Time Spent for Care: 30 minutes  More than 50% of total time spent on counseling and coordination of care as described above  Current Length of Stay: 4 day(s)    Current Patient Status: Inpatient   Certification Statement: The patient will continue to require additional inpatient hospital stay due to Above    Discharge Plan:  Expected 48 hours    Code Status: Level 1 - Full Code      Subjective:   Afebrile, hemodynamically stable  O2 saturation 93-96% on 3 L nasal cannula  Clinically patient appears comfortable  She is awake, alert, oriented to place, person, time  Noted with episodes of forgetfulness  Currently cooperative during exam   Reports feeling well, denies chest pain, dyspnea cough fever, chills, nausea, vomiting  Objective:     Vitals:   Temp (24hrs), Av 1 °F (36 7 °C), Min:97 3 °F (36 3 °C), Max:98 7 °F (37 1 °C)    Temp:  [97 3 °F (36 3 °C)-98 7 °F (37 1 °C)] 98 7 °F (37 1 °C)  HR:  [55-66] 55  Resp:  [18-21] 21  BP: ()/(62-77) 160/77  SpO2:  [82 %-96 %] 96 %  Body mass index is 35 07 kg/m²  Input and Output Summary (last 24 hours): Intake/Output Summary (Last 24 hours) at 2021 1244  Last data filed at 2021 0415  Gross per 24 hour   Intake 840 ml   Output 1075 ml   Net -235 ml       Physical Exam:     Physical Exam  Constitutional:       General: She is not in acute distress  Appearance: Normal appearance  She is not ill-appearing  Comments: Examined patient via video/visual observation and with Nurse's assistance and remoting in due to COVID 19 Precaution  HENT:      Head: Normocephalic and atraumatic  Mouth/Throat:      Pharynx: Oropharynx is clear  Eyes:      Extraocular Movements: Extraocular movements intact  Neck:      Musculoskeletal: Normal range of motion and neck supple  Cardiovascular:      Rate and Rhythm: Normal rate and regular rhythm  Pulses: Normal pulses  Heart sounds: Normal heart sounds  Pulmonary:      Effort: Pulmonary effort is normal  No respiratory distress  Breath sounds: Normal breath sounds  Abdominal:      General: Bowel sounds are normal  There is no distension  Palpations: Abdomen is soft  Musculoskeletal: Normal range of motion  General: No swelling  Skin:     Findings: No rash  Neurological:      General: No focal deficit present  Mental Status: She is alert  Comments: Patient is awake, alert, oriented to person, place, time  Forgetful at times requiring reorientation  Psychiatric:         Mood and Affect: Mood normal          Behavior: Behavior normal            Additional Data:     Labs:    Results from last 7 days   Lab Units 01/13/21  0422  01/09/21  1545   WBC Thousand/uL 11 93*   < > 16 85*   HEMOGLOBIN g/dL 13 7   < > 13 3   HEMATOCRIT % 41 3   < > 39 7   PLATELETS Thousands/uL 165   < > 189   BANDS PCT %  --   --  3   NEUTROS PCT % 83*  --   --    LYMPHS PCT % 14  --   --    LYMPHO PCT %  --   --  3*   MONOS PCT % 2*  --   --    MONO PCT %  --   --  4   EOS PCT % 0  --  0    < > = values in this interval not displayed  Results from last 7 days   Lab Units 01/13/21  0422   SODIUM mmol/L 136   POTASSIUM mmol/L 4 4   CHLORIDE mmol/L 104   CO2 mmol/L 24   BUN mg/dL 33*   CREATININE mg/dL 1 23   ANION GAP mmol/L 8   CALCIUM mg/dL 8 3   ALBUMIN g/dL 2 3*   TOTAL BILIRUBIN mg/dL 0 40   ALK PHOS U/L 51   ALT U/L 81*   AST U/L 50*   GLUCOSE RANDOM mg/dL 265*         Results from last 7 days   Lab Units 01/13/21  1130 01/13/21  0622 01/12/21  2029 01/12/21  1548 01/12/21  1132 01/12/21  0623 01/11/21  2100 01/11/21  1634 01/11/21  1240 01/11/21  0759 01/10/21  2122 01/10/21  1626   POC GLUCOSE mg/dl 205* 204* 197* 278* 315* 226* 191* 152* 156* 222* 157* 181*         Results from last 7 days   Lab Units 01/12/21  0441 01/11/21  1344   PROCALCITONIN ng/ml <0 05 0 06           * I Have Reviewed All Lab Data Listed Above    * Additional Pertinent Lab Tests Reviewed: All Labs Within Last 24 Hours Reviewed      Recent Cultures (last 7 days):           Last 24 Hours Medication List:   Current Facility-Administered Medications   Medication Dose Route Frequency Provider Last Rate    acetaminophen  650 mg Oral Q6H PRN Dwight Malloy MD      ascorbic acid  1,000 mg Oral Q12H Edilberto Sandoval MD      aspirin  81 mg Oral Daily Dwight Malloy MD      cholecalciferol  2,000 Units Oral Daily Dwight Malloy MD      dexamethasone  6 mg Intravenous Q24H Dwight Malloy MD      diltiazem  120 mg Oral Q12H Edilberto Sandoval MD      donepezil  10 mg Oral HS Dwight Malloy MD      heparin (porcine)  5,000 Units Subcutaneous Q8H Edilberto Sandoval MD      insulin glargine  15 Units Subcutaneous Daily With Lunch Zbigniew LEOS MD      insulin lispro  1-5 Units Subcutaneous TID AC Camilo Gallardo MD      insulin lispro  1-5 Units Subcutaneous HS Camilo Gallardo MD      levothyroxine  137 mcg Oral Early Morning Dwight Malloy MD      losartan potassium-hydrochlorothiazide (HYZAAR 100/12  5) combo dose   Oral Daily Dwight Malloy MD      melatonin  6 mg Oral HS Zbigniew LEOS MD      memantine  10 mg Oral BID Dwight Malloy MD      zinc sulfate  220 mg Oral Daily Dwight Malloy MD      Followed by   Karthikeyan Casanova ON 1/17/2021] multivitamin-minerals  1 tablet Oral Daily Dwight Malloy MD      OLANZapine  2 5 mg Intramuscular Q3H PRN Camilo Gallardo MD      ondansetron  4 mg Intravenous Q6H PRN Dwight Malloy MD      pravastatin  40 mg Oral Daily With Kelly Neil MD      remdesivir  100 mg Intravenous Q24H Dwight Malloy MD      tamoxifen  20 mg Oral BID Dwight Malloy MD          Today, Patient Was Seen By: Puja Martinez MD    ** Please Note: Dictation voice to text software may have been used in the creation of this document   **

## 2021-01-14 LAB
ALBUMIN SERPL BCP-MCNC: 2.1 G/DL (ref 3.5–5)
ALP SERPL-CCNC: 44 U/L (ref 46–116)
ALT SERPL W P-5'-P-CCNC: 78 U/L (ref 12–78)
ANION GAP SERPL CALCULATED.3IONS-SCNC: 9 MMOL/L (ref 4–13)
AST SERPL W P-5'-P-CCNC: 40 U/L (ref 5–45)
BASOPHILS # BLD AUTO: 0.01 THOUSANDS/ΜL (ref 0–0.1)
BASOPHILS NFR BLD AUTO: 0 % (ref 0–1)
BILIRUB SERPL-MCNC: 0.4 MG/DL (ref 0.2–1)
BUN SERPL-MCNC: 32 MG/DL (ref 5–25)
CALCIUM ALBUM COR SERPL-MCNC: 9.8 MG/DL (ref 8.3–10.1)
CALCIUM SERPL-MCNC: 8.3 MG/DL (ref 8.3–10.1)
CHLORIDE SERPL-SCNC: 105 MMOL/L (ref 100–108)
CO2 SERPL-SCNC: 22 MMOL/L (ref 21–32)
CREAT SERPL-MCNC: 0.99 MG/DL (ref 0.6–1.3)
EOSINOPHIL # BLD AUTO: 0 THOUSAND/ΜL (ref 0–0.61)
EOSINOPHIL NFR BLD AUTO: 0 % (ref 0–6)
ERYTHROCYTE [DISTWIDTH] IN BLOOD BY AUTOMATED COUNT: 12.8 % (ref 11.6–15.1)
GFR SERPL CREATININE-BSD FRML MDRD: 58 ML/MIN/1.73SQ M
GLUCOSE SERPL-MCNC: 174 MG/DL (ref 65–140)
GLUCOSE SERPL-MCNC: 211 MG/DL (ref 65–140)
GLUCOSE SERPL-MCNC: 222 MG/DL (ref 65–140)
GLUCOSE SERPL-MCNC: 251 MG/DL (ref 65–140)
GLUCOSE SERPL-MCNC: 261 MG/DL (ref 65–140)
HCT VFR BLD AUTO: 37.7 % (ref 34.8–46.1)
HGB BLD-MCNC: 12.8 G/DL (ref 11.5–15.4)
IMM GRANULOCYTES # BLD AUTO: 0.05 THOUSAND/UL (ref 0–0.2)
IMM GRANULOCYTES NFR BLD AUTO: 1 % (ref 0–2)
INR PPP: 1.22 (ref 0.84–1.19)
LYMPHOCYTES # BLD AUTO: 0.86 THOUSANDS/ΜL (ref 0.6–4.47)
LYMPHOCYTES NFR BLD AUTO: 12 % (ref 14–44)
MCH RBC QN AUTO: 34.3 PG (ref 26.8–34.3)
MCHC RBC AUTO-ENTMCNC: 34 G/DL (ref 31.4–37.4)
MCV RBC AUTO: 101 FL (ref 82–98)
MONOCYTES # BLD AUTO: 0.14 THOUSAND/ΜL (ref 0.17–1.22)
MONOCYTES NFR BLD AUTO: 2 % (ref 4–12)
NEUTROPHILS # BLD AUTO: 6.42 THOUSANDS/ΜL (ref 1.85–7.62)
NEUTS SEG NFR BLD AUTO: 85 % (ref 43–75)
NRBC BLD AUTO-RTO: 0 /100 WBCS
PLATELET # BLD AUTO: 151 THOUSANDS/UL (ref 149–390)
PMV BLD AUTO: 9.5 FL (ref 8.9–12.7)
POTASSIUM SERPL-SCNC: 4.7 MMOL/L (ref 3.5–5.3)
PROT SERPL-MCNC: 5.4 G/DL (ref 6.4–8.2)
PROTHROMBIN TIME: 14.8 SECONDS (ref 11.6–14.5)
RBC # BLD AUTO: 3.73 MILLION/UL (ref 3.81–5.12)
SODIUM SERPL-SCNC: 136 MMOL/L (ref 136–145)
WBC # BLD AUTO: 7.48 THOUSAND/UL (ref 4.31–10.16)

## 2021-01-14 PROCEDURE — 99232 SBSQ HOSP IP/OBS MODERATE 35: CPT | Performed by: STUDENT IN AN ORGANIZED HEALTH CARE EDUCATION/TRAINING PROGRAM

## 2021-01-14 PROCEDURE — 85610 PROTHROMBIN TIME: CPT | Performed by: STUDENT IN AN ORGANIZED HEALTH CARE EDUCATION/TRAINING PROGRAM

## 2021-01-14 PROCEDURE — 80053 COMPREHEN METABOLIC PANEL: CPT | Performed by: STUDENT IN AN ORGANIZED HEALTH CARE EDUCATION/TRAINING PROGRAM

## 2021-01-14 PROCEDURE — 85025 COMPLETE CBC W/AUTO DIFF WBC: CPT | Performed by: STUDENT IN AN ORGANIZED HEALTH CARE EDUCATION/TRAINING PROGRAM

## 2021-01-14 PROCEDURE — 82948 REAGENT STRIP/BLOOD GLUCOSE: CPT

## 2021-01-14 RX ADMIN — Medication 2000 UNITS: at 09:28

## 2021-01-14 RX ADMIN — OXYCODONE HYDROCHLORIDE AND ACETAMINOPHEN 1000 MG: 500 TABLET ORAL at 09:28

## 2021-01-14 RX ADMIN — DEXAMETHASONE SODIUM PHOSPHATE 6 MG: 4 INJECTION INTRA-ARTICULAR; INTRALESIONAL; INTRAMUSCULAR; INTRAVENOUS; SOFT TISSUE at 19:39

## 2021-01-14 RX ADMIN — LOSARTAN POTASSIUM: 50 TABLET, FILM COATED ORAL at 09:28

## 2021-01-14 RX ADMIN — HYDROCORTISONE: 25 CREAM TOPICAL at 17:52

## 2021-01-14 RX ADMIN — MEMANTINE 10 MG: 10 TABLET ORAL at 17:50

## 2021-01-14 RX ADMIN — DILTIAZEM HYDROCHLORIDE 120 MG: 60 CAPSULE, EXTENDED RELEASE ORAL at 09:30

## 2021-01-14 RX ADMIN — LEVOTHYROXINE SODIUM 137 MCG: 25 TABLET ORAL at 05:23

## 2021-01-14 RX ADMIN — DONEPEZIL HYDROCHLORIDE 10 MG: 5 TABLET ORAL at 23:07

## 2021-01-14 RX ADMIN — INSULIN LISPRO 1 UNITS: 100 INJECTION, SOLUTION INTRAVENOUS; SUBCUTANEOUS at 17:52

## 2021-01-14 RX ADMIN — INSULIN LISPRO 1 UNITS: 100 INJECTION, SOLUTION INTRAVENOUS; SUBCUTANEOUS at 21:25

## 2021-01-14 RX ADMIN — TAMOXIFEN CITRATE 20 MG: 10 TABLET ORAL at 17:51

## 2021-01-14 RX ADMIN — PRAVASTATIN SODIUM 40 MG: 40 TABLET ORAL at 17:50

## 2021-01-14 RX ADMIN — INSULIN GLARGINE 15 UNITS: 100 INJECTION, SOLUTION SUBCUTANEOUS at 13:05

## 2021-01-14 RX ADMIN — HEPARIN SODIUM 5000 UNITS: 5000 INJECTION INTRAVENOUS; SUBCUTANEOUS at 15:57

## 2021-01-14 RX ADMIN — MELATONIN 6 MG: at 23:07

## 2021-01-14 RX ADMIN — HEPARIN SODIUM 5000 UNITS: 5000 INJECTION INTRAVENOUS; SUBCUTANEOUS at 05:23

## 2021-01-14 RX ADMIN — ZINC SULFATE 220 MG (50 MG) CAPSULE 220 MG: CAPSULE at 09:29

## 2021-01-14 RX ADMIN — TAMOXIFEN CITRATE 20 MG: 10 TABLET ORAL at 09:30

## 2021-01-14 RX ADMIN — HYDROCORTISONE: 25 CREAM TOPICAL at 09:43

## 2021-01-14 RX ADMIN — HEPARIN SODIUM 5000 UNITS: 5000 INJECTION INTRAVENOUS; SUBCUTANEOUS at 21:24

## 2021-01-14 RX ADMIN — INSULIN LISPRO 1 UNITS: 100 INJECTION, SOLUTION INTRAVENOUS; SUBCUTANEOUS at 09:42

## 2021-01-14 RX ADMIN — MEMANTINE 10 MG: 10 TABLET ORAL at 09:28

## 2021-01-14 RX ADMIN — OXYCODONE HYDROCHLORIDE AND ACETAMINOPHEN 1000 MG: 500 TABLET ORAL at 21:22

## 2021-01-14 RX ADMIN — ASPIRIN 81 MG: 81 TABLET, COATED ORAL at 09:28

## 2021-01-14 RX ADMIN — DILTIAZEM HYDROCHLORIDE 120 MG: 60 CAPSULE, EXTENDED RELEASE ORAL at 21:23

## 2021-01-14 RX ADMIN — INSULIN LISPRO 2 UNITS: 100 INJECTION, SOLUTION INTRAVENOUS; SUBCUTANEOUS at 13:07

## 2021-01-14 NOTE — QUICK NOTE
Patient's daughter Sal Bryan was contacted today, and an update was provided, by this author, with regards to her mother's medical status  All questions and concerns were answered at this time

## 2021-01-14 NOTE — ASSESSMENT & PLAN NOTE
Admitted on 01/09/2021 with confusion and hypoxia  Patient diagnosed COVID-19 infection 12/30  History status initially mild dyspnea however this progressed in severity the patient complained more confused more dyspneic  Patient mL COVID-19 pathway, completed 5 day course from the  and currently on day 6 of dexamethasone  Patient is currently saturating 94% on 4 liters/minute of oxygen via nasal cannula  Indicates mild dyspnea, on ambulation to the restroom, but states that she has not been moving much  Afebrile and vitals stable  No leukocytosis  Electrolytes WNL  Today's assessment appears in no respiratory distress, not using accessory muscle respiration, however physical exam mild expiratory wheezes appreciated on auscultation     - Continue mild COVID treatment protocol; 5-day course of remdesivir  Currently on day 6 of dexamethasone  Heparin for VTE prophylaxis, vitamin-C, D and Zn   - Continue to monitor respiratory status; routine pulse-oximetry, titrate oxygen to a saturation of 93- 95%  Goal of maximum requirement of 2L/min via NC for discharge planning   - Encourage patient to continue incentive spirometry in addition to self-proning

## 2021-01-14 NOTE — PROGRESS NOTES
Progress Note - Asim Amezquita [birth year], 69 y o  female MRN: [id number]    Unit/Bed#: -01 Encounter: [id number]    Primary Care Provider: Adilson Holly MD   Date and time admitted to hospital: 1/9/2021  3:20 PM        * COVID-19  Assessment & Plan  Admitted on 01/09/2021 with confusion and hypoxia  Patient diagnosed COVID-19 infection 12/30  History status initially mild dyspnea however this progressed in severity the patient complained more confused more dyspneic  Patient mL COVID-19 pathway, completed 5 day course from the  and currently on day 6 of dexamethasone  Patient is currently saturating 94% on 4 liters/minute of oxygen via nasal cannula  Indicates mild dyspnea, on ambulation to the restroom, but states that she has not been moving much  Afebrile and vitals stable  No leukocytosis  Electrolytes WNL  Today's assessment appears in no respiratory distress, not using accessory muscle respiration, however physical exam mild expiratory wheezes appreciated on auscultation     - Continue mild COVID treatment protocol; 5-day course of remdesivir  Currently on day 6 of dexamethasone  Heparin for VTE prophylaxis, vitamin-C, D and Zn   - Continue to monitor respiratory status; routine pulse-oximetry, titrate oxygen to a saturation of 93- 95%  Goal of maximum requirement of 2L/min via NC for discharge planning   - Encourage patient to continue incentive spirometry in addition to self-proning  Acute respiratory failure with hypoxia [facility])  Assessment & Plan  See assessment and management for COVID-19 infection  Acute encephalopathy  Assessment & Plan  Patient presented with worsening disorientation  Likely secondary to toxic encephalopathy superimposed on baseline dementia  Currently patient is AAOx3, and believed to be at her baseline     - Resolved  Continue to monitor      Dementia ([facility] [address] )  Assessment & Plan  AAOx3 on today's encounter     - Continue donepezil and memantine routine   - Continue olanzapine prn for agitation, psychosis  - Redirect as needed  Hypertension  Assessment & Plan  BP stable  - Continue antihypertensives  - Monitor vitals  Q shift    VTE Pharmacologic Prophylaxis:   Pharmacologic: Heparin  Mechanical VTE Prophylaxis in Place: Yes    Patient Centered Rounds: I have performed bedside rounds with nursing staff today  Discussions with Specialists or Other Care Team Provider:  None  Education and Discussions with Family / Patient:  None  Time Spent for Care: 45 minutes  More than 50% of total time spent on counseling and coordination of care as described above  Current Length of Stay: 5 day(s)    Current Patient Status: Inpatient   Certification Statement: The patient will continue to require additional inpatient hospital stay due to Acute respiratory failure secondary to COVID-19 infection  Discharge Plan / Estimated Discharge Date:  As above /estimated discharge date 2021  Code Status: Level 1 - Full Code      Subjective:   Patient interviewed today at the bedside with necessary airborne precautions taken  Patient states that she was able to ambulate to the toilet and does not recall becoming short of breath  Denies any headaches, dizziness, fevers, chills, chest pain, palpitations or generalized weakness  No adverse events reported by nursing staff over previous follow-up interval     Objective:     Vitals:   Temp (24hrs), Av 7 °F (36 5 °C), Min:97 4 °F (36 3 °C), Max:98 2 °F (36 8 °C)    Temp:  [97 4 °F (36 3 °C)-98 2 °F (36 8 °C)] 97 4 °F (36 3 °C)  HR:  [52-71] 66  BP: (115-152)/(53-87) 115/54  SpO2:  [88 %-95 %] 90 %  Body mass index is 35 07 kg/m²  Input and Output Summary (last 24 hours): Intake/Output Summary (Last 24 hours) at 2021 1447  Last data filed at 2021  Gross per 24 hour   Intake 360 ml   Output    Net 360 ml       Physical Exam:     Physical Exam  Vitals signs reviewed     Constitutional: Appearance: Normal appearance  HENT:      Head: Normocephalic and atraumatic  Comments: Hard of hearing  Mouth/Throat:      Mouth: Mucous membranes are moist    Eyes:      Conjunctiva/sclera: Conjunctivae normal    Cardiovascular:      Rate and Rhythm: Normal rate and regular rhythm  Heart sounds: S1 normal and S2 normal    Pulmonary:      Effort: No tachypnea, accessory muscle usage or respiratory distress  Breath sounds: Examination of the right-middle field reveals wheezing  Examination of the left-middle field reveals wheezing  Examination of the right-lower field reveals wheezing  Examination of the left-lower field reveals wheezing  Wheezing present  No rhonchi or rales  Comments: Mild & expiratory  Abdominal:      Palpations: Abdomen is soft  Musculoskeletal: Normal range of motion  Skin:     General: Skin is warm and dry  Neurological:      Mental Status: She is alert and oriented to person, place, and time  Psychiatric:         Mood and Affect: Mood normal          Behavior: Behavior normal            Additional Data:     Labs:    Results from last 7 days   Lab Units 01/14/21  0522   WBC Thousand/uL 7 48   HEMOGLOBIN g/dL 12 8   HEMATOCRIT % 37 7   PLATELETS Thousands/uL 151   NEUTROS PCT % 85*   LYMPHS PCT % 12*   MONOS PCT % 2*   EOS PCT % 0     Results from last 7 days   Lab Units 01/14/21  0522   POTASSIUM mmol/L 4 7   CHLORIDE mmol/L 105   CO2 mmol/L 22   BUN mg/dL 32*   CREATININE mg/dL 0 99   CALCIUM mg/dL 8 3   ALK PHOS U/L 44*   ALT U/L 78   AST U/L 40     Results from last 7 days   Lab Units 01/14/21  0522   INR  1 22*       * I Have Reviewed All Lab Data Listed Above  * Additional Pertinent Lab Tests Reviewed: All Labs Within Last 24 Hours Reviewed    Imaging:    Imaging Reports Reviewed Today Include: CXR (1/09/2021)  Imaging Personally Reviewed by Myself Includes:  None        Recent Cultures (last 7 days):           Last 24 Hours Medication List:   Current Facility-Administered Medications   Medication Dose Route Frequency Provider Last Rate    acetaminophen  650 mg Oral Q6H PRN Azalia Hassan MD      ascorbic acid  1,000 mg Oral Q12H Starla Sanchez MD      aspirin  81 mg Oral Daily Azalia Hassan MD      cholecalciferol  2,000 Units Oral Daily Azalia Hassan MD      dexamethasone  6 mg Intravenous Q24H Azalia Hassan MD      diltiazem  120 mg Oral Q12H Starla Sanchez MD      donepezil  10 mg Oral HS Azalia Hassan MD      heparin (porcine)  5,000 Units Subcutaneous Q8H [facility] Azalia Hassan MD      hydrocortisone   Topical BID Zbigniew LEOS MD      insulin glargine  15 Units Subcutaneous Daily With Lunch Zbigniew LEOS MD      insulin lispro  1-5 Units Subcutaneous TID AC Oni Leung MD      insulin lispro  1-5 Units Subcutaneous HS Oni Leung MD      levothyroxine  137 mcg Oral Early Morning Azalia Hassan MD      losartan potassium-hydrochlorothiazide (HYZAAR 100/12  5) combo dose   Oral Daily Azalia Hassan MD      melatonin  6 mg Oral HS Zbigniew LEOS MD      memantine  10 mg Oral BID Azalia Hassan MD      zinc sulfate  220 mg Oral Daily Azalia Hassan MD      Followed by   Kathya Lamar ON 1/17/2021] multivitamin-minerals  1 tablet Oral Daily Azalia Hassan MD      OLANZapine  2 5 mg Intramuscular Q3H PRN Oni Leung MD      ondansetron  4 mg Intravenous Q6H PRN Azalia Hassan MD      pravastatin  40 mg Oral Daily With Tawanda Redding MD      tamoxifen  20 mg Oral BID Azalia Hassan MD          Today, Patient Was Seen By: Leeanne Carter MD    ** Please Note: Dragon 360 Dictation voice to text software may have been used in the creation of this document   **

## 2021-01-14 NOTE — ASSESSMENT & PLAN NOTE
AAOx3 on today's encounter     - Continue donepezil and memantine routine   - Continue olanzapine prn for agitation, psychosis  - Redirect as needed

## 2021-01-14 NOTE — ASSESSMENT & PLAN NOTE
Patient presented with worsening disorientation  Likely secondary to toxic encephalopathy superimposed on baseline dementia  Currently patient is AAOx3, and believed to be at her baseline     - Resolved  Continue to monitor

## 2021-01-15 PROBLEM — E11.9 TYPE 2 DIABETES MELLITUS (HCC): Status: ACTIVE | Noted: 2021-01-15

## 2021-01-15 LAB
ANION GAP SERPL CALCULATED.3IONS-SCNC: 9 MMOL/L (ref 4–13)
BASOPHILS # BLD AUTO: 0.01 THOUSANDS/ΜL (ref 0–0.1)
BASOPHILS NFR BLD AUTO: 0 % (ref 0–1)
BUN SERPL-MCNC: 32 MG/DL (ref 5–25)
CALCIUM SERPL-MCNC: 8.4 MG/DL (ref 8.3–10.1)
CHLORIDE SERPL-SCNC: 103 MMOL/L (ref 100–108)
CO2 SERPL-SCNC: 23 MMOL/L (ref 21–32)
CREAT SERPL-MCNC: 1.05 MG/DL (ref 0.6–1.3)
EOSINOPHIL # BLD AUTO: 0 THOUSAND/ΜL (ref 0–0.61)
EOSINOPHIL NFR BLD AUTO: 0 % (ref 0–6)
ERYTHROCYTE [DISTWIDTH] IN BLOOD BY AUTOMATED COUNT: 12.7 % (ref 11.6–15.1)
GFR SERPL CREATININE-BSD FRML MDRD: 54 ML/MIN/1.73SQ M
GLUCOSE SERPL-MCNC: 126 MG/DL (ref 65–140)
GLUCOSE SERPL-MCNC: 173 MG/DL (ref 65–140)
GLUCOSE SERPL-MCNC: 238 MG/DL (ref 65–140)
GLUCOSE SERPL-MCNC: 249 MG/DL (ref 65–140)
HCT VFR BLD AUTO: 42 % (ref 34.8–46.1)
HGB BLD-MCNC: 14 G/DL (ref 11.5–15.4)
IMM GRANULOCYTES # BLD AUTO: 0.12 THOUSAND/UL (ref 0–0.2)
IMM GRANULOCYTES NFR BLD AUTO: 1 % (ref 0–2)
LYMPHOCYTES # BLD AUTO: 1.34 THOUSANDS/ΜL (ref 0.6–4.47)
LYMPHOCYTES NFR BLD AUTO: 13 % (ref 14–44)
MCH RBC QN AUTO: 34.4 PG (ref 26.8–34.3)
MCHC RBC AUTO-ENTMCNC: 33.3 G/DL (ref 31.4–37.4)
MCV RBC AUTO: 103 FL (ref 82–98)
MONOCYTES # BLD AUTO: 0.12 THOUSAND/ΜL (ref 0.17–1.22)
MONOCYTES NFR BLD AUTO: 1 % (ref 4–12)
NEUTROPHILS # BLD AUTO: 8.59 THOUSANDS/ΜL (ref 1.85–7.62)
NEUTS SEG NFR BLD AUTO: 85 % (ref 43–75)
NRBC BLD AUTO-RTO: 0 /100 WBCS
PLATELET # BLD AUTO: 162 THOUSANDS/UL (ref 149–390)
PMV BLD AUTO: 9.3 FL (ref 8.9–12.7)
POTASSIUM SERPL-SCNC: 4.5 MMOL/L (ref 3.5–5.3)
RBC # BLD AUTO: 4.07 MILLION/UL (ref 3.81–5.12)
SODIUM SERPL-SCNC: 135 MMOL/L (ref 136–145)
WBC # BLD AUTO: 10.18 THOUSAND/UL (ref 4.31–10.16)

## 2021-01-15 PROCEDURE — 80048 BASIC METABOLIC PNL TOTAL CA: CPT | Performed by: INTERNAL MEDICINE

## 2021-01-15 PROCEDURE — 85025 COMPLETE CBC W/AUTO DIFF WBC: CPT | Performed by: INTERNAL MEDICINE

## 2021-01-15 PROCEDURE — 99232 SBSQ HOSP IP/OBS MODERATE 35: CPT | Performed by: STUDENT IN AN ORGANIZED HEALTH CARE EDUCATION/TRAINING PROGRAM

## 2021-01-15 PROCEDURE — 82948 REAGENT STRIP/BLOOD GLUCOSE: CPT

## 2021-01-15 RX ORDER — INSULIN GLARGINE 100 [IU]/ML
18 INJECTION, SOLUTION SUBCUTANEOUS
Status: DISCONTINUED | OUTPATIENT
Start: 2021-01-15 | End: 2021-01-20 | Stop reason: HOSPADM

## 2021-01-15 RX ADMIN — OXYCODONE HYDROCHLORIDE AND ACETAMINOPHEN 1000 MG: 500 TABLET ORAL at 22:00

## 2021-01-15 RX ADMIN — INSULIN LISPRO 1 UNITS: 100 INJECTION, SOLUTION INTRAVENOUS; SUBCUTANEOUS at 07:44

## 2021-01-15 RX ADMIN — DONEPEZIL HYDROCHLORIDE 10 MG: 5 TABLET ORAL at 22:04

## 2021-01-15 RX ADMIN — DILTIAZEM HYDROCHLORIDE 120 MG: 60 CAPSULE, EXTENDED RELEASE ORAL at 22:00

## 2021-01-15 RX ADMIN — HYDROCORTISONE: 25 CREAM TOPICAL at 09:43

## 2021-01-15 RX ADMIN — TAMOXIFEN CITRATE 20 MG: 10 TABLET ORAL at 17:33

## 2021-01-15 RX ADMIN — HYDROCORTISONE: 25 CREAM TOPICAL at 17:33

## 2021-01-15 RX ADMIN — OXYCODONE HYDROCHLORIDE AND ACETAMINOPHEN 1000 MG: 500 TABLET ORAL at 09:41

## 2021-01-15 RX ADMIN — HEPARIN SODIUM 5000 UNITS: 5000 INJECTION INTRAVENOUS; SUBCUTANEOUS at 22:05

## 2021-01-15 RX ADMIN — INSULIN LISPRO 1 UNITS: 100 INJECTION, SOLUTION INTRAVENOUS; SUBCUTANEOUS at 22:07

## 2021-01-15 RX ADMIN — PRAVASTATIN SODIUM 40 MG: 40 TABLET ORAL at 17:34

## 2021-01-15 RX ADMIN — INSULIN LISPRO 3 UNITS: 100 INJECTION, SOLUTION INTRAVENOUS; SUBCUTANEOUS at 07:43

## 2021-01-15 RX ADMIN — INSULIN LISPRO 3 UNITS: 100 INJECTION, SOLUTION INTRAVENOUS; SUBCUTANEOUS at 17:32

## 2021-01-15 RX ADMIN — MELATONIN 6 MG: at 22:04

## 2021-01-15 RX ADMIN — Medication 2000 UNITS: at 09:40

## 2021-01-15 RX ADMIN — ZINC SULFATE 220 MG (50 MG) CAPSULE 220 MG: CAPSULE at 09:41

## 2021-01-15 RX ADMIN — DILTIAZEM HYDROCHLORIDE 120 MG: 60 CAPSULE, EXTENDED RELEASE ORAL at 09:44

## 2021-01-15 RX ADMIN — INSULIN LISPRO 3 UNITS: 100 INJECTION, SOLUTION INTRAVENOUS; SUBCUTANEOUS at 12:35

## 2021-01-15 RX ADMIN — HEPARIN SODIUM 5000 UNITS: 5000 INJECTION INTRAVENOUS; SUBCUTANEOUS at 14:04

## 2021-01-15 RX ADMIN — TAMOXIFEN CITRATE 20 MG: 10 TABLET ORAL at 09:42

## 2021-01-15 RX ADMIN — ACETAMINOPHEN 650 MG: 325 TABLET, FILM COATED ORAL at 05:09

## 2021-01-15 RX ADMIN — ASPIRIN 81 MG: 81 TABLET, COATED ORAL at 09:41

## 2021-01-15 RX ADMIN — INSULIN GLARGINE 18 UNITS: 100 INJECTION, SOLUTION SUBCUTANEOUS at 12:55

## 2021-01-15 RX ADMIN — LEVOTHYROXINE SODIUM 137 MCG: 25 TABLET ORAL at 05:09

## 2021-01-15 RX ADMIN — MEMANTINE 10 MG: 10 TABLET ORAL at 09:40

## 2021-01-15 RX ADMIN — DEXAMETHASONE SODIUM PHOSPHATE 6 MG: 4 INJECTION INTRA-ARTICULAR; INTRALESIONAL; INTRAMUSCULAR; INTRAVENOUS; SOFT TISSUE at 22:04

## 2021-01-15 RX ADMIN — MEMANTINE 10 MG: 10 TABLET ORAL at 17:34

## 2021-01-15 RX ADMIN — INSULIN LISPRO 2 UNITS: 100 INJECTION, SOLUTION INTRAVENOUS; SUBCUTANEOUS at 11:35

## 2021-01-15 RX ADMIN — HEPARIN SODIUM 5000 UNITS: 5000 INJECTION INTRAVENOUS; SUBCUTANEOUS at 05:12

## 2021-01-15 RX ADMIN — LOSARTAN POTASSIUM: 50 TABLET, FILM COATED ORAL at 09:40

## 2021-01-15 NOTE — PROGRESS NOTES
Progress Note Sanjuana Guaman [birth year], 69 y o  female MRN: [id number]    Unit/Bed#: -01 Encounter: [id number]    Primary Care Provider: Diana Stein MD   Date and time admitted to hospital: 1/9/2021  3:20 PM        * COVID-19  Assessment & Plan  Admitted on 01/09/2021 with confusion and hypoxia  Patient diagnosed COVID-19 infection 12/30  History status initially mild dyspnea however this progressed in severity the patient complained more confused more dyspneic  Patient on mild COVID-19 pathway, completed 5 day course of remdesivir and currently on day 6 of dexamethasone  Patient continues to saturate at 95% on 4 liters/minute of oxygen via nasal cannula  Mild dyspnea exertion  Afebrile and vitals stable  WBC's elevated todat at 10  18  Low-normal sodium     - Continue mild COVID treatment protocol; completed 5-day course of remdesivir  Continue dexamethasone; Currently on day 7  Heparin for VTE prophylaxis, vitamin-C, D and Zn   - Continue to monitor respiratory status; routine pulse-oximetry, titrate oxygen to a saturation of 93- 95%  Goal of maximum requirement of 2L/min via NC for discharge planning   - Encourage patient to continue incentive spirometry in addition to self-proning  Acute respiratory failure with hypoxia [facility])  Assessment & Plan  See assessment and management for COVID-19 infection  Acute encephalopathy  Assessment & Plan  Patient presented with worsening disorientation  Likely secondary to toxic encephalopathy superimposed on baseline dementia  Currently patient is AAOx3, and believed to be at her baseline     - Resolved  Continue to monitor  Dementia ([facility] [address] )  Assessment & Plan  AAOx3 on today's encounter     - Continue donepezil and memantine routine   - Continue olanzapine prn for agitation, psychosis  - Redirect as needed  Hypertension  Assessment & Plan  BP stable  - Continue antihypertensives    - Monitor vitals  Q shift    Type 2 diabetes mellitus [facility])  Assessment & Plan  Lab Results   Component Value Date    HGBA1C 5 9 (H) 2020       Recent Labs     21  1609 21  2052 01/15/21  1120 01/15/21  1639   POCGLU 211* 174* 249* 126       Blood Sugar Average: Last 72 hrs:  (P) 225 [id number]     HbA1c within appropriate range  POC glucose within appropriate range on average     - Continue basal bolus insulin; current 18 units of glargine hs plus 3 units Humalog t i d  A c  plus sliding scale  - Continue to monitor POC glucose  VTE Pharmacologic Prophylaxis:   Pharmacologic: Heparin  Mechanical VTE Prophylaxis in Place: Yes    Patient Centered Rounds: I have performed bedside rounds with nursing staff today  Discussions with Specialists or Other Care Team Provider:  None  Education and Discussions with Family / Patient:  None  Time Spent for Care: 45 minutes  More than 50% of total time spent on counseling and coordination of care as described above  Current Length of Stay: 6 day(s)    Current Patient Status: Inpatient   Certification Statement: The patient will continue to require additional inpatient hospital stay due to Management COVID-19 infection  Patient still requires suboptimal oxygen supplementation for discharge  Discharge Plan / Estimated Discharge Date:  As above /estimated discharge date on or before 2020  Code Status: Level 1 - Full Code      Subjective:   Patient reports that aside from occasional shortness of breath with activity, she feels well  Denies any fevers, chills, chest pain, palpitations, diarrhea, or generalized weakness  Objective:     Vitals:   Temp (24hrs), Av 9 °F (36 6 °C), Min:97 5 °F (36 4 °C), Max:98 1 °F (36 7 °C)    Temp:  [97 5 °F (36 4 °C)-98 1 °F (36 7 °C)] 98 1 °F (36 7 °C)  HR:  [53-72] 53  Resp:  [18-22] 19  BP: (132-151)/(63-68) 150/63  SpO2:  [88 %-95 %] 95 %  Body mass index is 34 76 kg/m²  Input and Output Summary (last 24 hours):        Intake/Output Summary (Last 24 hours) at 1/15/2021 1704  Last data filed at 1/15/2021 0501  Gross per 24 hour   Intake    Output 450 ml   Net -450 ml       Physical Exam:     Physical Exam  Vitals signs reviewed  Constitutional:       Appearance: Normal appearance  She is obese  HENT:      Mouth/Throat:      Mouth: Mucous membranes are moist    Eyes:      General:         Right eye: No discharge  Left eye: No discharge  Conjunctiva/sclera: Conjunctivae normal    Neck:      Musculoskeletal: Neck supple  Cardiovascular:      Rate and Rhythm: Normal rate and regular rhythm  Heart sounds: S1 normal and S2 normal    Pulmonary:      Effort: Pulmonary effort is normal  No tachypnea, accessory muscle usage or respiratory distress  Abdominal:      Palpations: Abdomen is soft  Musculoskeletal:         General: No swelling or tenderness  Skin:     General: Skin is warm and dry  Neurological:      General: No focal deficit present  Mental Status: She is alert  Psychiatric:         Mood and Affect: Mood normal          Behavior: Behavior normal          Additional Data:     Labs:    Results from last 7 days   Lab Units 01/15/21  0519   WBC Thousand/uL 10 18*   HEMOGLOBIN g/dL 14 0   HEMATOCRIT % 42 0   PLATELETS Thousands/uL 162   NEUTROS PCT % 85*   LYMPHS PCT % 13*   MONOS PCT % 1*   EOS PCT % 0     Results from last 7 days   Lab Units 01/15/21  0519 01/14/21  0522   POTASSIUM mmol/L 4 5 4 7   CHLORIDE mmol/L 103 105   CO2 mmol/L 23 22   BUN mg/dL 32* 32*   CREATININE mg/dL 1 05 0 99   CALCIUM mg/dL 8 4 8 3   ALK PHOS U/L  --  44*   ALT U/L  --  78   AST U/L  --  40     Results from last 7 days   Lab Units 01/14/21  0522   INR  1 22*       * I Have Reviewed All Lab Data Listed Above  * Additional Pertinent Lab Tests Reviewed: All Labs Within Last 24 Hours Reviewed    Imaging:    Imaging Reports Reviewed Today Include:  None  Imaging Personally Reviewed by Myself Includes:  None        Recent Cultures (last 7 days):           Last 24 Hours Medication List:   Current Facility-Administered Medications   Medication Dose Route Frequency Provider Last Rate    acetaminophen  650 mg Oral Q6H PRN Kiah Helm MD      ascorbic acid  1,000 mg Oral Q12H Marc Deleon MD      aspirin  81 mg Oral Daily Kiah Helm MD      cholecalciferol  2,000 Units Oral Daily Kiah Helm MD      dexamethasone  6 mg Intravenous Q24H Kiah Helm MD      diltiazem  120 mg Oral Q12H Marc Deleon MD      donepezil  10 mg Oral HS Kiah Helm MD      heparin (porcine)  5,000 Units Subcutaneous Q8H [facility] Kiah Helm MD      hydrocortisone   Topical BID Zbigniew LEOS MD      insulin glargine  18 Units Subcutaneous Daily With Lunch Kavin Mehta MD      insulin lispro  1-5 Units Subcutaneous TID AC Leonarda Craig MD      insulin lispro  1-5 Units Subcutaneous HS Leonarda Craig MD     Kyung Slipper insulin lispro  3 Units Subcutaneous TID With Meals Kavin Mehta MD      levothyroxine  137 mcg Oral Early Morning Kiah Helm MD      losartan potassium-hydrochlorothiazide (HYZAAR 100/12  5) combo dose   Oral Daily Kiah Helm MD      melatonin  6 mg Oral HS Zbigniew LEOS MD      memantine  10 mg Oral BID Kiah Helm MD      zinc sulfate  220 mg Oral Daily Kiah Helm MD      Followed by   Oneil Ayers ON 1/17/2021] multivitamin-minerals  1 tablet Oral Daily Kiah Helm MD      OLANZapine  2 5 mg Intramuscular Q3H PRN Leonarda Craig MD      ondansetron  4 mg Intravenous Q6H PRN Kiah Helm MD      pravastatin  40 mg Oral Daily With Lesly Kenny MD      tamoxifen  20 mg Oral BID Kiah Helm MD          Today, Patient Was Seen By: Lucie Weinberg MD    ** Please Note: Dragon 360 Dictation voice to text software may have been used in the creation of this document   **

## 2021-01-15 NOTE — PLAN OF CARE
Problem: Potential for Falls  Goal: Patient will remain free of falls  Description: INTERVENTIONS:  - Assess patient frequently for physical needs  -  Identify cognitive and physical deficits and behaviors that affect risk of falls    -  [place] fall precautions as indicated by assessment   - Educate patient/family on patient safety including physical limitations  - Instruct patient to call for assistance with activity based on assessment  - Modify environment to reduce risk of injury  - Consider OT/PT consult to assist with strengthening/mobility  1/15/2021 1522 by Chuy Allen RN  Outcome: Progressing  1/15/2021 1512 by Chuy Allen RN  Outcome: Progressing     Problem: INFECTION - ADULT  Goal: Absence or prevention of progression during hospitalization  Description: INTERVENTIONS:  - Assess and monitor for signs and symptoms of infection  - Monitor lab/diagnostic results  - Monitor all insertion sites, i e  indwelling lines, tubes, and drains  - Monitor endotracheal if appropriate and nasal secretions for changes in amount and color  - [place] appropriate cooling/warming therapies per order  - Administer medications as ordered  - Instruct and encourage patient and family to use good hand hygiene technique  - Identify and instruct in appropriate isolation precautions for identified infection/condition  1/15/2021 1522 by Chuy Allen RN  Outcome: Progressing  1/15/2021 1512 by Chuy Allen RN  Outcome: Progressing  Goal: Absence of fever/infection during neutropenic period  Description: INTERVENTIONS:  - Monitor WBC    1/15/2021 1522 by Chuy Allen RN  Outcome: Progressing  1/15/2021 1512 by Chuy Allen RN  Outcome: Progressing     Problem: RESPIRATORY - ADULT  Goal: Achieves optimal ventilation and oxygenation  Description: INTERVENTIONS:  - Assess for changes in respiratory status  - Assess for changes in mentation and behavior  - Position to facilitate oxygenation and minimize respiratory effort  - Oxygen administered by appropriate delivery if ordered  - Initiate smoking cessation education as indicated  - Encourage broncho-pulmonary hygiene including cough, deep breathe, Incentive Spirometry  - Assess the need for suctioning and aspirate as needed  - Assess and instruct to report SOB or any respiratory difficulty  - Respiratory Therapy support as indicated  1/15/2021 1522 by Joanne Grimes RN  Outcome: Progressing  1/15/2021 1512 by Joanne Grimes RN  Outcome: Progressing     Problem: METABOLIC, FLUID AND ELECTROLYTES - ADULT  Goal: Electrolytes maintained within normal limits  Description: INTERVENTIONS:  - Monitor labs and assess patient for signs and symptoms of electrolyte imbalances  - Administer electrolyte replacement as ordered  - Monitor response to electrolyte replacements, including repeat lab results as appropriate  - Instruct patient on fluid and nutrition as appropriate  1/15/2021 1522 by Joanne Grimes RN  Outcome: Progressing  1/15/2021 1512 by Joanne Grimes RN  Outcome: Progressing  Goal: Fluid balance maintained  Description: INTERVENTIONS:  - Monitor labs   - Monitor I/O and WT  - Instruct patient on fluid and nutrition as appropriate  - Assess for signs & symptoms of volume excess or deficit  1/15/2021 1522 by Joanne Grimes RN  Outcome: Progressing  1/15/2021 1512 by Joanne Grimes RN  Outcome: Progressing     Problem: HEMATOLOGIC - ADULT  Goal: Maintains hematologic stability  Description: INTERVENTIONS  - Assess for signs and symptoms of bleeding or hemorrhage  - Monitor labs  - Administer supportive blood products/factors as ordered and appropriate  1/15/2021 1522 by Joanne Grimes RN  Outcome: Progressing  1/15/2021 1512 by Joanne Grimes RN  Outcome: Progressing     Problem: Nutrition/Hydration-ADULT  Goal: Nutrient/Hydration intake appropriate for improving, restoring or maintaining nutritional needs  Description: Monitor and assess patient's nutrition/hydration status for malnutrition  Collaborate with interdisciplinary team and initiate plan and interventions as ordered  Monitor patient's weight and dietary intake as ordered or per policy  Utilize nutrition screening tool and intervene as necessary  Determine patient's food preferences and provide high-protein, high-caloric foods as appropriate       INTERVENTIONS:  - Monitor oral intake, urinary output, labs, and treatment plans  - Assess nutrition and hydration status and recommend course of action  - Evaluate amount of meals eaten  - Assist patient with eating if necessary   - Allow adequate time for meals  - Recommend/ encourage appropriate diets, oral nutritional supplements, and vitamin/mineral supplements  - Order, calculate, and assess calorie counts as needed  - Assess need for intravenous fluids  - Provide nutrition/hydration education as appropriate  - Include patient/family/caregiver in decisions related to nutrition  1/15/2021 1522 by Joanne Grimes RN  Outcome: Progressing  1/15/2021 1512 by Joanne Grimes RN  Outcome: Progressing     Problem: Prexisting or High Potential for Compromised Skin Integrity  Goal: Skin integrity is maintained or improved  Description: INTERVENTIONS:  - Identify patients at risk for skin breakdown  - Assess and monitor skin integrity  - Assess and monitor nutrition and hydration status  - Monitor labs   - Assess for incontinence   - Turn and reposition patient  - Assist with mobility/ambulation  - Relieve pressure over bony prominences  - Avoid friction and shearing  - Provide appropriate hygiene as needed including keeping skin clean and dry  - Evaluate need for skin moisturizer/barrier cream  - Collaborate with interdisciplinary team   - Patient/family teaching  - Consider wound care consult   Outcome: Progressing

## 2021-01-15 NOTE — ASSESSMENT & PLAN NOTE
Lab Results   Component Value Date    HGBA1C 5 9 (H) 08/21/2020       Recent Labs     01/14/21  1609 01/14/21  2052 01/15/21  1120 01/15/21  1639   POCGLU 211* 174* 249* 126       Blood Sugar Average: Last 72 hrs:  (P) 225 [id number]     HbA1c within appropriate range  POC glucose within appropriate range on average     - Continue basal bolus insulin; current 18 units of glargine hs plus 3 units Humalog t i d  A c  plus sliding scale  - Continue to monitor POC glucose

## 2021-01-15 NOTE — ASSESSMENT & PLAN NOTE
Admitted on 01/09/2021 with confusion and hypoxia  Patient diagnosed COVID-19 infection 12/30  History status initially mild dyspnea however this progressed in severity the patient complained more confused more dyspneic  Patient on mild COVID-19 pathway, completed 5 day course of remdesivir and currently on day 6 of dexamethasone  Patient continues to saturate at 95% on 4 liters/minute of oxygen via nasal cannula  Mild dyspnea exertion  Afebrile and vitals stable  WBC's elevated todat at 10  18  Low-normal sodium     - Continue mild COVID treatment protocol; completed 5-day course of remdesivir  Continue dexamethasone; Currently on day 7  Heparin for VTE prophylaxis, vitamin-C, D and Zn   - Continue to monitor respiratory status; routine pulse-oximetry, titrate oxygen to a saturation of 93- 95%  Goal of maximum requirement of 2L/min via NC for discharge planning   - Encourage patient to continue incentive spirometry in addition to self-proning

## 2021-01-15 NOTE — QUICK NOTE
An attempt made to contact Mrs Wu's daughter, Mireya Muse for routine health status update was unsuccessful

## 2021-01-15 NOTE — PLAN OF CARE
Problem: Potential for Falls  Goal: Patient will remain free of falls  Description: INTERVENTIONS:  - Assess patient frequently for physical needs  -  Identify cognitive and physical deficits and behaviors that affect risk of falls    -  [place] fall precautions as indicated by assessment   - Educate patient/family on patient safety including physical limitations  - Instruct patient to call for assistance with activity based on assessment  - Modify environment to reduce risk of injury  - Consider OT/PT consult to assist with strengthening/mobility  Outcome: Progressing     Problem: PAIN - ADULT  Goal: Verbalizes/displays adequate comfort level or baseline comfort level  Description: Interventions:  - Encourage patient to monitor pain and request assistance  - Assess pain using appropriate pain scale  - Administer analgesics based on type and severity of pain and evaluate response  - Implement non-pharmacological measures as appropriate and evaluate response  - Consider cultural and social influences on pain and pain management  - Notify physician/advanced practitioner if interventions unsuccessful or patient reports new pain  Outcome: Progressing     Problem: INFECTION - ADULT  Goal: Absence or prevention of progression during hospitalization  Description: INTERVENTIONS:  - Assess and monitor for signs and symptoms of infection  - Monitor lab/diagnostic results  - Monitor all insertion sites, i e  indwelling lines, tubes, and drains  - Monitor endotracheal if appropriate and nasal secretions for changes in amount and color  - [place] appropriate cooling/warming therapies per order  - Administer medications as ordered  - Instruct and encourage patient and family to use good hand hygiene technique  - Identify and instruct in appropriate isolation precautions for identified infection/condition  Outcome: Progressing  Goal: Absence of fever/infection during neutropenic period  Description: INTERVENTIONS:  - Monitor WBC    Outcome: Progressing     Problem: RESPIRATORY - ADULT  Goal: Achieves optimal ventilation and oxygenation  Description: INTERVENTIONS:  - Assess for changes in respiratory status  - Assess for changes in mentation and behavior  - Position to facilitate oxygenation and minimize respiratory effort  - Oxygen administered by appropriate delivery if ordered  - Initiate smoking cessation education as indicated  - Encourage broncho-pulmonary hygiene including cough, deep breathe, Incentive Spirometry  - Assess the need for suctioning and aspirate as needed  - Assess and instruct to report SOB or any respiratory difficulty  - Respiratory Therapy support as indicated  Outcome: Progressing     Problem: METABOLIC, FLUID AND ELECTROLYTES - ADULT  Goal: Electrolytes maintained within normal limits  Description: INTERVENTIONS:  - Monitor labs and assess patient for signs and symptoms of electrolyte imbalances  - Administer electrolyte replacement as ordered  - Monitor response to electrolyte replacements, including repeat lab results as appropriate  - Instruct patient on fluid and nutrition as appropriate  Outcome: Progressing  Goal: Fluid balance maintained  Description: INTERVENTIONS:  - Monitor labs   - Monitor I/O and WT  - Instruct patient on fluid and nutrition as appropriate  - Assess for signs & symptoms of volume excess or deficit  Outcome: Progressing     Problem: HEMATOLOGIC - ADULT  Goal: Maintains hematologic stability  Description: INTERVENTIONS  - Assess for signs and symptoms of bleeding or hemorrhage  - Monitor labs  - Administer supportive blood products/factors as ordered and appropriate  Outcome: Progressing     Problem: Nutrition/Hydration-ADULT  Goal: Nutrient/Hydration intake appropriate for improving, restoring or maintaining nutritional needs  Description: Monitor and assess patient's nutrition/hydration status for malnutrition   Collaborate with interdisciplinary team and initiate plan and interventions as ordered  Monitor patient's weight and dietary intake as ordered or per policy  Utilize nutrition screening tool and intervene as necessary  Determine patient's food preferences and provide high-protein, high-caloric foods as appropriate       INTERVENTIONS:  - Monitor oral intake, urinary output, labs, and treatment plans  - Assess nutrition and hydration status and recommend course of action  - Evaluate amount of meals eaten  - Assist patient with eating if necessary   - Allow adequate time for meals  - Recommend/ encourage appropriate diets, oral nutritional supplements, and vitamin/mineral supplements  - Order, calculate, and assess calorie counts as needed  - Assess need for intravenous fluids  - Provide nutrition/hydration education as appropriate  - Include patient/family/caregiver in decisions related to nutrition  Outcome: Progressing

## 2021-01-16 LAB
ANION GAP SERPL CALCULATED.3IONS-SCNC: 10 MMOL/L (ref 4–13)
BASOPHILS # BLD MANUAL: 0 THOUSAND/UL (ref 0–0.1)
BASOPHILS NFR MAR MANUAL: 0 % (ref 0–1)
BUN SERPL-MCNC: 32 MG/DL (ref 5–25)
CALCIUM SERPL-MCNC: 8.3 MG/DL (ref 8.3–10.1)
CHLORIDE SERPL-SCNC: 101 MMOL/L (ref 100–108)
CO2 SERPL-SCNC: 21 MMOL/L (ref 21–32)
CREAT SERPL-MCNC: 1.05 MG/DL (ref 0.6–1.3)
EOSINOPHIL # BLD MANUAL: 0 THOUSAND/UL (ref 0–0.4)
EOSINOPHIL NFR BLD MANUAL: 0 % (ref 0–6)
ERYTHROCYTE [DISTWIDTH] IN BLOOD BY AUTOMATED COUNT: 12.9 % (ref 11.6–15.1)
GFR SERPL CREATININE-BSD FRML MDRD: 54 ML/MIN/1.73SQ M
GLUCOSE SERPL-MCNC: 204 MG/DL (ref 65–140)
GLUCOSE SERPL-MCNC: 214 MG/DL (ref 65–140)
GLUCOSE SERPL-MCNC: 235 MG/DL (ref 65–140)
GLUCOSE SERPL-MCNC: 324 MG/DL (ref 65–140)
GLUCOSE SERPL-MCNC: 326 MG/DL (ref 65–140)
HCT VFR BLD AUTO: 40.3 % (ref 34.8–46.1)
HGB BLD-MCNC: 13.5 G/DL (ref 11.5–15.4)
LYMPHOCYTES # BLD AUTO: 0.24 THOUSAND/UL (ref 0.6–4.47)
LYMPHOCYTES # BLD AUTO: 3 % (ref 14–44)
MCH RBC QN AUTO: 34.1 PG (ref 26.8–34.3)
MCHC RBC AUTO-ENTMCNC: 33.5 G/DL (ref 31.4–37.4)
MCV RBC AUTO: 102 FL (ref 82–98)
MONOCYTES # BLD AUTO: 0.08 THOUSAND/UL (ref 0–1.22)
MONOCYTES NFR BLD: 1 % (ref 4–12)
MYELOCYTES NFR BLD MANUAL: 1 % (ref 0–1)
NEUTROPHILS # BLD MANUAL: 7.52 THOUSAND/UL (ref 1.85–7.62)
NEUTS BAND NFR BLD MANUAL: 1 % (ref 0–8)
NEUTS SEG NFR BLD AUTO: 93 % (ref 43–75)
NRBC BLD AUTO-RTO: 0 /100 WBCS
PLATELET # BLD AUTO: 147 THOUSANDS/UL (ref 149–390)
PLATELET BLD QL SMEAR: ADEQUATE
PMV BLD AUTO: 9.6 FL (ref 8.9–12.7)
POTASSIUM SERPL-SCNC: 4.5 MMOL/L (ref 3.5–5.3)
RBC # BLD AUTO: 3.96 MILLION/UL (ref 3.81–5.12)
SODIUM SERPL-SCNC: 132 MMOL/L (ref 136–145)
TOTAL CELLS COUNTED SPEC: 100
VARIANT LYMPHS # BLD AUTO: 1 %
WBC # BLD AUTO: 8 THOUSAND/UL (ref 4.31–10.16)

## 2021-01-16 PROCEDURE — 85007 BL SMEAR W/DIFF WBC COUNT: CPT | Performed by: INTERNAL MEDICINE

## 2021-01-16 PROCEDURE — 82948 REAGENT STRIP/BLOOD GLUCOSE: CPT

## 2021-01-16 PROCEDURE — 80048 BASIC METABOLIC PNL TOTAL CA: CPT | Performed by: INTERNAL MEDICINE

## 2021-01-16 PROCEDURE — 85027 COMPLETE CBC AUTOMATED: CPT | Performed by: INTERNAL MEDICINE

## 2021-01-16 PROCEDURE — 99232 SBSQ HOSP IP/OBS MODERATE 35: CPT | Performed by: STUDENT IN AN ORGANIZED HEALTH CARE EDUCATION/TRAINING PROGRAM

## 2021-01-16 RX ADMIN — INSULIN LISPRO 3 UNITS: 100 INJECTION, SOLUTION INTRAVENOUS; SUBCUTANEOUS at 09:46

## 2021-01-16 RX ADMIN — ZINC SULFATE 220 MG (50 MG) CAPSULE 220 MG: CAPSULE at 09:36

## 2021-01-16 RX ADMIN — TAMOXIFEN CITRATE 20 MG: 10 TABLET ORAL at 09:49

## 2021-01-16 RX ADMIN — ASPIRIN 81 MG: 81 TABLET, COATED ORAL at 09:36

## 2021-01-16 RX ADMIN — HEPARIN SODIUM 5000 UNITS: 5000 INJECTION INTRAVENOUS; SUBCUTANEOUS at 05:41

## 2021-01-16 RX ADMIN — INSULIN LISPRO 3 UNITS: 100 INJECTION, SOLUTION INTRAVENOUS; SUBCUTANEOUS at 13:19

## 2021-01-16 RX ADMIN — DILTIAZEM HYDROCHLORIDE 120 MG: 60 CAPSULE, EXTENDED RELEASE ORAL at 21:13

## 2021-01-16 RX ADMIN — Medication 2000 UNITS: at 09:36

## 2021-01-16 RX ADMIN — MEMANTINE 10 MG: 10 TABLET ORAL at 09:36

## 2021-01-16 RX ADMIN — LOSARTAN POTASSIUM: 50 TABLET, FILM COATED ORAL at 09:36

## 2021-01-16 RX ADMIN — HEPARIN SODIUM 5000 UNITS: 5000 INJECTION INTRAVENOUS; SUBCUTANEOUS at 13:12

## 2021-01-16 RX ADMIN — INSULIN LISPRO 1 UNITS: 100 INJECTION, SOLUTION INTRAVENOUS; SUBCUTANEOUS at 21:45

## 2021-01-16 RX ADMIN — OXYCODONE HYDROCHLORIDE AND ACETAMINOPHEN 1000 MG: 500 TABLET ORAL at 09:36

## 2021-01-16 RX ADMIN — DEXAMETHASONE SODIUM PHOSPHATE 6 MG: 4 INJECTION INTRA-ARTICULAR; INTRALESIONAL; INTRAMUSCULAR; INTRAVENOUS; SOFT TISSUE at 21:12

## 2021-01-16 RX ADMIN — LEVOTHYROXINE SODIUM 137 MCG: 25 TABLET ORAL at 05:41

## 2021-01-16 RX ADMIN — DILTIAZEM HYDROCHLORIDE 120 MG: 60 CAPSULE, EXTENDED RELEASE ORAL at 09:47

## 2021-01-16 RX ADMIN — DONEPEZIL HYDROCHLORIDE 10 MG: 5 TABLET ORAL at 21:12

## 2021-01-16 RX ADMIN — INSULIN GLARGINE 18 UNITS: 100 INJECTION, SOLUTION SUBCUTANEOUS at 13:12

## 2021-01-16 RX ADMIN — INSULIN LISPRO 2 UNITS: 100 INJECTION, SOLUTION INTRAVENOUS; SUBCUTANEOUS at 13:18

## 2021-01-16 RX ADMIN — MELATONIN 6 MG: at 21:12

## 2021-01-16 RX ADMIN — HYDROCORTISONE 1 APPLICATION: 25 CREAM TOPICAL at 09:48

## 2021-01-16 RX ADMIN — HEPARIN SODIUM 5000 UNITS: 5000 INJECTION INTRAVENOUS; SUBCUTANEOUS at 21:13

## 2021-01-16 NOTE — ASSESSMENT & PLAN NOTE
Lab Results   Component Value Date    HGBA1C 5 9 (H) 08/21/2020       Recent Labs     01/15/21  2111 01/16/21  0539 01/16/21  1131 01/16/21  1608   POCGLU 173* 326* 235* 214*       Blood Sugar Average: Last 72 hrs:  (P) 220 5

## 2021-01-16 NOTE — PROGRESS NOTES
Progress Note [place] [birth year], 69 y o  female MRN: [id number]    Unit/Bed#: -01 Encounter: [id number]    Primary Care Provider: Chris Galicia MD   Date and time admitted to hospital: 1/9/2021  3:20 PM        * COVID-19  Assessment & Plan  Patient is on mild COVID-19 pathway  Completed remdesivir treatment  Currently on IV Decadron  Hypoxia slow to improve  Currently O2 saturation 91-94% on to 5 L nasal cannula  Clinically patient is not in distress  Continue mild treatment protocol  Continue with steroids  Encouraged spirometry use  Encouraged self proning as able  Continue supportive care  Acute respiratory failure with hypoxia [facility])  Assessment & Plan  Improving  Currently 91-94% on 3-5 L nasal cannula  Plan as stated above under COVID-19  Continue oxygen supplementation p r n  Will reduce oxygen as able    Type 2 diabetes mellitus [facility])  Assessment & Plan  Lab Results   Component Value Date    HGBA1C 5 9 (H) 08/21/2020       Recent Labs     01/15/21  2111 01/16/21  0539 01/16/21  1131 01/16/21  1608   POCGLU 173* 326* 235* 214*       Blood Sugar Average: Last 72 hrs:  (P) 220 5          A1c 5 9        FSG elevated        Likely due to steroids  Diabetic diet, sliding scale coverage  Acute encephalopathy  Assessment & Plan  Likely toxic metabolic in the setting of acute COVID-19 infection with underlying dementia  Patient appears slightly confused  Encourage reorientation and delirium precautions  Fall precautions      History of breast cancer  Assessment & Plan  Continue home dose tamoxifen    Dementia ([facility] [address] )  Assessment & Plan  Continue home medications Aricept and Namenda  Earlier patient had episode of agitated behavior requiring restrain and close one-to-one observation  Currently awake, alert, oriented self, place, time  Intermittent forgetfulness requiring reorientation  Discontinue restrain and close observation for now    Encouraged good sleep hygiene  Hypertension  Assessment & Plan  Blood pressure appears controlled  Continue home medications losartan/hydrochlorothiazide, Cardizem  Continue to monitor blood pressure with holding parameters for the medications      VTE Pharmacologic Prophylaxis:   Pharmacologic: Heparin    Patient Centered Rounds: I have performed bedside rounds with nursing staff today  Education and Discussions with Family / Patient: spoke with daughter Isaiah Decker over the phon  Time Spent for Care: 30 minutes  More than 50% of total time spent on counseling and coordination of care as described above  Current Length of Stay: 7 day(s)    Current Patient Status: Inpatient   Certification Statement: The patient will continue to require additional inpatient hospital stay due to Above    Discharge Plan: tbd    Code Status: Level 1 - Full Code      Subjective:   Afebrile, hemodynamically stable  O2 saturation 91-94% around 3-5 L nasal cannula  Clinically not in distress  Awake, alert, oriented at her baseline  Forgetful at times  Poor historian  No other events reported  Objective:     Vitals:   Temp (24hrs), Av °F (36 7 °C), Min:97 9 °F (36 6 °C), Max:98 °F (36 7 °C)    Temp:  [97 9 °F (36 6 °C)-98 °F (36 7 °C)] 98 °F (36 7 °C)  HR:  [50-67] 64  Resp:  [16-19] 19  BP: (120-145)/(53-67) 132/58  SpO2:  [84 %-98 %] 91 %  Body mass index is 33 27 kg/m²  Input and Output Summary (last 24 hours): Intake/Output Summary (Last 24 hours) at 2021 1657  Last data filed at 2021 0951  Gross per 24 hour   Intake 640 ml   Output    Net 640 ml       Physical Exam:     Physical Exam  Constitutional:       General: She is not in acute distress  Appearance: Normal appearance  She is not ill-appearing  Comments: Examined patient via video/visual observation and with Nurse's assistance and remoting in due to COVID 19 Precaution  HENT:      Head: Normocephalic and atraumatic        Mouth/Throat: Pharynx: Oropharynx is clear  Eyes:      Extraocular Movements: Extraocular movements intact  Neck:      Musculoskeletal: Normal range of motion and neck supple  Cardiovascular:      Rate and Rhythm: Normal rate and regular rhythm  Pulses: Normal pulses  Heart sounds: Normal heart sounds  Pulmonary:      Effort: Pulmonary effort is normal  No respiratory distress  Breath sounds: Normal breath sounds  Abdominal:      General: Bowel sounds are normal  There is no distension  Palpations: Abdomen is soft  Musculoskeletal: Normal range of motion  General: No swelling  Skin:     Findings: No rash  Neurological:      General: No focal deficit present  Mental Status: She is alert  Comments: Patient is awake, alert, oriented to person, place, time  Forgetful at times requiring reorientation  Psychiatric:         Mood and Affect: Mood normal          Behavior: Behavior normal            Additional Data:     Labs:    Results from last 7 days   Lab Units 01/16/21  0536 01/15/21  0519   WBC Thousand/uL 8 00 10 18*   HEMOGLOBIN g/dL 13 5 14 0   HEMATOCRIT % 40 3 42 0   PLATELETS Thousands/uL 147* 162   BANDS PCT % 1  --    NEUTROS PCT %  --  85*   LYMPHS PCT %  --  13*   LYMPHO PCT % 3*  --    MONOS PCT %  --  1*   MONO PCT % 1*  --    EOS PCT % 0 0     Results from last 7 days   Lab Units 01/16/21 0536  01/14/21  0522   SODIUM mmol/L 132*   < > 136   POTASSIUM mmol/L 4 5   < > 4 7   CHLORIDE mmol/L 101   < > 105   CO2 mmol/L 21   < > 22   BUN mg/dL 32*   < > 32*   CREATININE mg/dL 1 05   < > 0 99   ANION GAP mmol/L 10   < > 9   CALCIUM mg/dL 8 3   < > 8 3   ALBUMIN g/dL  --   --  2 1*   TOTAL BILIRUBIN mg/dL  --   --  0 40   ALK PHOS U/L  --   --  44*   ALT U/L  --   --  78   AST U/L  --   --  40   GLUCOSE RANDOM mg/dL 324*   < > 251*    < > = values in this interval not displayed       Results from last 7 days   Lab Units 01/14/21  0522   INR  1 22*     Results from last 7 days   Lab Units 01/16/21  1608 01/16/21  1131 01/16/21  0539 01/15/21  2111 01/15/21  1639 01/15/21  1120 01/14/21  2052 01/14/21  1609 01/14/21  1100 01/14/21  0523 01/13/21  2040 01/13/21  1552   POC GLUCOSE mg/dl 214* 235* 326* 173* 126 249* 174* 211* 261* 222* 242* 245*         Results from last 7 days   Lab Units 01/12/21  0441 01/11/21  1344   PROCALCITONIN ng/ml <0 05 0 06           * I Have Reviewed All Lab Data Listed Above  * Additional Pertinent Lab Tests Reviewed: All Labs Within Last 24 Hours Reviewed    Recent Cultures (last 7 days):           Last 24 Hours Medication List:   Current Facility-Administered Medications   Medication Dose Route Frequency Provider Last Rate    acetaminophen  650 mg Oral Q6H PRN Luis Jimenez MD      aspirin  81 mg Oral Daily Luis Jimenez MD      cholecalciferol  2,000 Units Oral Daily Luis Jimenez MD      dexamethasone  6 mg Intravenous Q24H Luis Jimenez MD      diltiazem  120 mg Oral Q12H Tavo Gregorio MD      donepezil  10 mg Oral HS Luis Jimenez MD      heparin (porcine)  5,000 Units Subcutaneous Q8H [address] Luis Jimenez MD      hydrocortisone   Topical BID Zbigniew LEOS MD      insulin glargine  18 Units Subcutaneous Daily With Lunch Mayi Munroe MD      insulin lispro  1-5 Units Subcutaneous TID AC Victor Manuel Hurd MD      insulin lispro  1-5 Units Subcutaneous HS MD Henrry Wilson insulin lispro  3 Units Subcutaneous TID With Meals MD Henrry Mora levothyroxine  137 mcg Oral Early Morning Luis Jimenez MD      losartan potassium-hydrochlorothiazide (HYZAAR 100/12  5) combo dose   Oral Daily Luis Jimenez MD      melatonin  6 mg Oral HS Zbigniew LEOS MD      memantine  10 mg Oral BID Luis Jimenez MD      [START ON 1/17/2021] multivitamin-minerals  1 tablet Oral Daily Luis Jimenze MD      OLANZapine  2 5 mg Intramuscular Q3H PRN Victor Manuel Hurd MD      ondansetron  4 mg Intravenous Q6H PRN Noble Cartwright MD      pravastatin  40 mg Oral Daily With Nick Hayes MD      tamoxifen  20 mg Oral BID Noble Cartwright MD          Today, Patient Was Seen By: Emiliano Nielsen MD    ** Please Note: Dictation voice to text software may have been used in the creation of this document   **

## 2021-01-17 LAB
ALBUMIN SERPL BCP-MCNC: 2.2 G/DL (ref 3.5–5)
ALP SERPL-CCNC: 46 U/L (ref 46–116)
ALT SERPL W P-5'-P-CCNC: 64 U/L (ref 12–78)
ANION GAP SERPL CALCULATED.3IONS-SCNC: 6 MMOL/L (ref 4–13)
AST SERPL W P-5'-P-CCNC: 30 U/L (ref 5–45)
BILIRUB SERPL-MCNC: 0.6 MG/DL (ref 0.2–1)
BUN SERPL-MCNC: 35 MG/DL (ref 5–25)
CALCIUM ALBUM COR SERPL-MCNC: 10 MG/DL (ref 8.3–10.1)
CALCIUM SERPL-MCNC: 8.6 MG/DL (ref 8.3–10.1)
CHLORIDE SERPL-SCNC: 104 MMOL/L (ref 100–108)
CO2 SERPL-SCNC: 23 MMOL/L (ref 21–32)
CREAT SERPL-MCNC: 1.08 MG/DL (ref 0.6–1.3)
CRP SERPL QL: 25.8 MG/L
D DIMER PPP FEU-MCNC: 0.41 UG/ML FEU
ERYTHROCYTE [DISTWIDTH] IN BLOOD BY AUTOMATED COUNT: 13 % (ref 11.6–15.1)
FERRITIN SERPL-MCNC: 242 NG/ML (ref 8–388)
GFR SERPL CREATININE-BSD FRML MDRD: 52 ML/MIN/1.73SQ M
GLUCOSE SERPL-MCNC: 104 MG/DL (ref 65–140)
GLUCOSE SERPL-MCNC: 160 MG/DL (ref 65–140)
GLUCOSE SERPL-MCNC: 184 MG/DL (ref 65–140)
GLUCOSE SERPL-MCNC: 186 MG/DL (ref 65–140)
GLUCOSE SERPL-MCNC: 232 MG/DL (ref 65–140)
HCT VFR BLD AUTO: 38.1 % (ref 34.8–46.1)
HGB BLD-MCNC: 12.8 G/DL (ref 11.5–15.4)
MCH RBC QN AUTO: 34.1 PG (ref 26.8–34.3)
MCHC RBC AUTO-ENTMCNC: 33.6 G/DL (ref 31.4–37.4)
MCV RBC AUTO: 102 FL (ref 82–98)
NRBC BLD AUTO-RTO: 0 /100 WBCS
PLATELET # BLD AUTO: 145 THOUSANDS/UL (ref 149–390)
PMV BLD AUTO: 9.5 FL (ref 8.9–12.7)
POTASSIUM SERPL-SCNC: 5.5 MMOL/L (ref 3.5–5.3)
PROT SERPL-MCNC: 5.7 G/DL (ref 6.4–8.2)
RBC # BLD AUTO: 3.75 MILLION/UL (ref 3.81–5.12)
SODIUM SERPL-SCNC: 133 MMOL/L (ref 136–145)
WBC # BLD AUTO: 9.91 THOUSAND/UL (ref 4.31–10.16)

## 2021-01-17 PROCEDURE — 85379 FIBRIN DEGRADATION QUANT: CPT | Performed by: STUDENT IN AN ORGANIZED HEALTH CARE EDUCATION/TRAINING PROGRAM

## 2021-01-17 PROCEDURE — 85027 COMPLETE CBC AUTOMATED: CPT | Performed by: STUDENT IN AN ORGANIZED HEALTH CARE EDUCATION/TRAINING PROGRAM

## 2021-01-17 PROCEDURE — 80053 COMPREHEN METABOLIC PANEL: CPT | Performed by: STUDENT IN AN ORGANIZED HEALTH CARE EDUCATION/TRAINING PROGRAM

## 2021-01-17 PROCEDURE — 99232 SBSQ HOSP IP/OBS MODERATE 35: CPT | Performed by: STUDENT IN AN ORGANIZED HEALTH CARE EDUCATION/TRAINING PROGRAM

## 2021-01-17 PROCEDURE — 82948 REAGENT STRIP/BLOOD GLUCOSE: CPT

## 2021-01-17 PROCEDURE — 86140 C-REACTIVE PROTEIN: CPT | Performed by: STUDENT IN AN ORGANIZED HEALTH CARE EDUCATION/TRAINING PROGRAM

## 2021-01-17 PROCEDURE — 82728 ASSAY OF FERRITIN: CPT | Performed by: STUDENT IN AN ORGANIZED HEALTH CARE EDUCATION/TRAINING PROGRAM

## 2021-01-17 RX ORDER — SODIUM POLYSTYRENE SULFONATE 4.1 MEQ/G
15 POWDER, FOR SUSPENSION ORAL; RECTAL ONCE
Status: COMPLETED | OUTPATIENT
Start: 2021-01-17 | End: 2021-01-17

## 2021-01-17 RX ADMIN — SODIUM POLYSTYRENE SULFONATE 15 G: 1 POWDER ORAL; RECTAL at 18:03

## 2021-01-17 RX ADMIN — ASPIRIN 81 MG: 81 TABLET, COATED ORAL at 09:19

## 2021-01-17 RX ADMIN — HEPARIN SODIUM 5000 UNITS: 5000 INJECTION INTRAVENOUS; SUBCUTANEOUS at 05:36

## 2021-01-17 RX ADMIN — Medication 1 TABLET: at 09:18

## 2021-01-17 RX ADMIN — INSULIN LISPRO 3 UNITS: 100 INJECTION, SOLUTION INTRAVENOUS; SUBCUTANEOUS at 08:17

## 2021-01-17 RX ADMIN — HYDROCORTISONE: 25 CREAM TOPICAL at 18:05

## 2021-01-17 RX ADMIN — INSULIN LISPRO 1 UNITS: 100 INJECTION, SOLUTION INTRAVENOUS; SUBCUTANEOUS at 07:17

## 2021-01-17 RX ADMIN — INSULIN GLARGINE 18 UNITS: 100 INJECTION, SOLUTION SUBCUTANEOUS at 12:12

## 2021-01-17 RX ADMIN — MELATONIN 6 MG: at 21:00

## 2021-01-17 RX ADMIN — INSULIN LISPRO 1 UNITS: 100 INJECTION, SOLUTION INTRAVENOUS; SUBCUTANEOUS at 18:04

## 2021-01-17 RX ADMIN — LEVOTHYROXINE SODIUM 137 MCG: 25 TABLET ORAL at 05:36

## 2021-01-17 RX ADMIN — DEXAMETHASONE SODIUM PHOSPHATE 6 MG: 4 INJECTION INTRA-ARTICULAR; INTRALESIONAL; INTRAMUSCULAR; INTRAVENOUS; SOFT TISSUE at 20:50

## 2021-01-17 RX ADMIN — Medication 2000 UNITS: at 09:18

## 2021-01-17 RX ADMIN — HEPARIN SODIUM 5000 UNITS: 5000 INJECTION INTRAVENOUS; SUBCUTANEOUS at 21:04

## 2021-01-17 RX ADMIN — INSULIN LISPRO 3 UNITS: 100 INJECTION, SOLUTION INTRAVENOUS; SUBCUTANEOUS at 18:04

## 2021-01-17 RX ADMIN — PRAVASTATIN SODIUM 40 MG: 40 TABLET ORAL at 18:03

## 2021-01-17 RX ADMIN — LOSARTAN POTASSIUM: 50 TABLET, FILM COATED ORAL at 09:18

## 2021-01-17 RX ADMIN — INSULIN LISPRO 3 UNITS: 100 INJECTION, SOLUTION INTRAVENOUS; SUBCUTANEOUS at 12:13

## 2021-01-17 RX ADMIN — TAMOXIFEN CITRATE 20 MG: 10 TABLET ORAL at 18:04

## 2021-01-17 RX ADMIN — MEMANTINE 10 MG: 10 TABLET ORAL at 18:03

## 2021-01-17 RX ADMIN — DILTIAZEM HYDROCHLORIDE 120 MG: 60 CAPSULE, EXTENDED RELEASE ORAL at 20:58

## 2021-01-17 RX ADMIN — DONEPEZIL HYDROCHLORIDE 10 MG: 5 TABLET ORAL at 21:00

## 2021-01-17 RX ADMIN — TAMOXIFEN CITRATE 20 MG: 10 TABLET ORAL at 09:20

## 2021-01-17 RX ADMIN — HEPARIN SODIUM 5000 UNITS: 5000 INJECTION INTRAVENOUS; SUBCUTANEOUS at 14:13

## 2021-01-17 RX ADMIN — HYDROCORTISONE: 25 CREAM TOPICAL at 09:19

## 2021-01-17 RX ADMIN — INSULIN LISPRO 2 UNITS: 100 INJECTION, SOLUTION INTRAVENOUS; SUBCUTANEOUS at 12:13

## 2021-01-17 RX ADMIN — DILTIAZEM HYDROCHLORIDE 120 MG: 60 CAPSULE, EXTENDED RELEASE ORAL at 09:19

## 2021-01-17 RX ADMIN — MEMANTINE 10 MG: 10 TABLET ORAL at 09:19

## 2021-01-17 NOTE — QUICK NOTE
Patient's daughter Roxanne Mckoy was contacted today and an update provided with regards to her mother's medical condition and management  All questions and concerns were addressed at this time

## 2021-01-17 NOTE — ASSESSMENT & PLAN NOTE
Lab Results   Component Value Date    HGBA1C 5 9 (H) 08/21/2020       Recent Labs     01/16/21  1131 01/16/21  1608 01/16/21  2143 01/17/21  0543   POCGLU 235* 214* 204* 186*       Blood Sugar Average: Last 72 hrs:  (P) 259 [id number]     HbA1c within appropriate range  POC glucose slightly elevated since previous assessment     - Continue basal bolus insulin; current 18 units of glargine hs plus 3 units Humalog t i d  A c  plus sliding scale  Will continue to monitor and adjust as needed  - Continue to monitor POC glucose

## 2021-01-17 NOTE — ASSESSMENT & PLAN NOTE
Reported to be periodically disoriented  AAOx3 on today's encounter     - Continue donepezil and memantine routine   - Continue olanzapine prn for agitation, psychosis  - Redirect as needed

## 2021-01-17 NOTE — PLAN OF CARE
Problem: Potential for Falls  Goal: Patient will remain free of falls  Description: INTERVENTIONS:  - Assess patient frequently for physical needs  -  Identify cognitive and physical deficits and behaviors that affect risk of falls    -  [place] fall precautions as indicated by assessment   - Educate patient/family on patient safety including physical limitations  - Instruct patient to call for assistance with activity based on assessment  - Modify environment to reduce risk of injury  - Consider OT/PT consult to assist with strengthening/mobility  Outcome: Progressing     Problem: PAIN - ADULT  Goal: Verbalizes/displays adequate comfort level or baseline comfort level  Description: Interventions:  - Encourage patient to monitor pain and request assistance  - Assess pain using appropriate pain scale  - Administer analgesics based on type and severity of pain and evaluate response  - Implement non-pharmacological measures as appropriate and evaluate response  - Consider cultural and social influences on pain and pain management  - Notify physician/advanced practitioner if interventions unsuccessful or patient reports new pain  Outcome: Progressing     Problem: INFECTION - ADULT  Goal: Absence or prevention of progression during hospitalization  Description: INTERVENTIONS:  - Assess and monitor for signs and symptoms of infection  - Monitor lab/diagnostic results  - Monitor all insertion sites, i e  indwelling lines, tubes, and drains  - Monitor endotracheal if appropriate and nasal secretions for changes in amount and color  - [place] appropriate cooling/warming therapies per order  - Administer medications as ordered  - Instruct and encourage patient and family to use good hand hygiene technique  - Identify and instruct in appropriate isolation precautions for identified infection/condition  Outcome: Progressing  Goal: Absence of fever/infection during neutropenic period  Description: INTERVENTIONS:  - Monitor WBC    Outcome: Progressing     Problem: RESPIRATORY - ADULT  Goal: Achieves optimal ventilation and oxygenation  Description: INTERVENTIONS:  - Assess for changes in respiratory status  - Assess for changes in mentation and behavior  - Position to facilitate oxygenation and minimize respiratory effort  - Oxygen administered by appropriate delivery if ordered  - Initiate smoking cessation education as indicated  - Encourage broncho-pulmonary hygiene including cough, deep breathe, Incentive Spirometry  - Assess the need for suctioning and aspirate as needed  - Assess and instruct to report SOB or any respiratory difficulty  - Respiratory Therapy support as indicated  Outcome: Progressing     Problem: METABOLIC, FLUID AND ELECTROLYTES - ADULT  Goal: Electrolytes maintained within normal limits  Description: INTERVENTIONS:  - Monitor labs and assess patient for signs and symptoms of electrolyte imbalances  - Administer electrolyte replacement as ordered  - Monitor response to electrolyte replacements, including repeat lab results as appropriate  - Instruct patient on fluid and nutrition as appropriate  Outcome: Progressing  Goal: Fluid balance maintained  Description: INTERVENTIONS:  - Monitor labs   - Monitor I/O and WT  - Instruct patient on fluid and nutrition as appropriate  - Assess for signs & symptoms of volume excess or deficit  Outcome: Progressing     Problem: HEMATOLOGIC - ADULT  Goal: Maintains hematologic stability  Description: INTERVENTIONS  - Assess for signs and symptoms of bleeding or hemorrhage  - Monitor labs  - Administer supportive blood products/factors as ordered and appropriate  Outcome: Progressing     Problem: Nutrition/Hydration-ADULT  Goal: Nutrient/Hydration intake appropriate for improving, restoring or maintaining nutritional needs  Description: Monitor and assess patient's nutrition/hydration status for malnutrition   Collaborate with interdisciplinary team and initiate plan and interventions as ordered  Monitor patient's weight and dietary intake as ordered or per policy  Utilize nutrition screening tool and intervene as necessary  Determine patient's food preferences and provide high-protein, high-caloric foods as appropriate       INTERVENTIONS:  - Monitor oral intake, urinary output, labs, and treatment plans  - Assess nutrition and hydration status and recommend course of action  - Evaluate amount of meals eaten  - Assist patient with eating if necessary   - Allow adequate time for meals  - Recommend/ encourage appropriate diets, oral nutritional supplements, and vitamin/mineral supplements  - Order, calculate, and assess calorie counts as needed  - Assess need for intravenous fluids  - Provide nutrition/hydration education as appropriate  - Include patient/family/caregiver in decisions related to nutrition  Outcome: Progressing     Problem: Prexisting or High Potential for Compromised Skin Integrity  Goal: Skin integrity is maintained or improved  Description: INTERVENTIONS:  - Identify patients at risk for skin breakdown  - Assess and monitor skin integrity  - Assess and monitor nutrition and hydration status  - Monitor labs   - Assess for incontinence   - Turn and reposition patient  - Assist with mobility/ambulation  - Relieve pressure over bony prominences  - Avoid friction and shearing  - Provide appropriate hygiene as needed including keeping skin clean and dry  - Evaluate need for skin moisturizer/barrier cream  - Collaborate with interdisciplinary team   - Patient/family teaching  - Consider wound care consult   Outcome: Progressing

## 2021-01-17 NOTE — ASSESSMENT & PLAN NOTE
Admitted on 01/09/2021 with confusion and hypoxia  Patient diagnosed COVID-19 infection 12/30  History status initially mild dyspnea however this progressed in severity the patient complained more confused more dyspneic  Patient on mild COVID-19 pathway, completed 5 day course of remdesivir and currently on day 9 of dexamethasone  Patient currently saturating at 94-95% on 3-4 liters/minute of oxygen via nasal cannula  Mild dyspnea exertion  Afebrile and vitals stable  WBC of 9 91  CRP elevated at 25 8  D-dimer normal at 0 41       - Continue mild COVID treatment protocol; completed 5-day course of remdesivir  Continue dexamethasone 6 mg q24h; Currently on day 9  Heparin for VTE prophylaxis, vitamin-C, D and Zn   - Continue to monitor respiratory status; routine pulse-oximetry, titrate oxygen to a saturation of 93- 95%  Goal of maximum requirement of 2L/min via NC for discharge planning   - Encourage patient to continue incentive spirometry in addition to self-proning

## 2021-01-17 NOTE — PROGRESS NOTES
Progress Note Dimas Gayle [birth year], 69 y o  female MRN: [id number]    Unit/Bed#: -01 Encounter: [id number]    Primary Care Provider: Emile Beyer MD   Date and time admitted to hospital: 1/9/2021  3:20 PM        * COVID-19  Assessment & Plan  Admitted on 01/09/2021 with confusion and hypoxia  Patient diagnosed COVID-19 infection 12/30  History status initially mild dyspnea however this progressed in severity the patient complained more confused more dyspneic  Patient on mild COVID-19 pathway, completed 5 day course of remdesivir and currently on day 9 of dexamethasone  Patient currently saturating at 94-95% on 3-4 liters/minute of oxygen via nasal cannula  Mild dyspnea exertion  Afebrile and vitals stable  WBC of 9 91  CRP elevated at 25 8  D-dimer normal at 0 41       - Continue mild COVID treatment protocol; completed 5-day course of remdesivir  Continue dexamethasone 6 mg q24h; Currently on day 9  Heparin for VTE prophylaxis, vitamin-C, D and Zn   - Continue to monitor respiratory status; routine pulse-oximetry, titrate oxygen to a saturation of 93- 95%  Goal of maximum requirement of 2L/min via NC for discharge planning   - Encourage patient to continue incentive spirometry in addition to self-proning  Acute respiratory failure with hypoxia [facility])  Assessment & Plan  See assessment and management for COVID-19 infection  Dementia [facility])  Assessment & Plan  Reported to be periodically disoriented  AAOx3 on today's encounter     - Continue donepezil and memantine routine   - Continue olanzapine prn for agitation, psychosis  - Redirect as needed  Hypertension  Assessment & Plan  BP stable  - Continue antihypertensives    - Monitor vitals  Q shift    Type 2 diabetes mellitus [facility])  Assessment & Plan  Lab Results   Component Value Date    HGBA1C 5 9 (H) 08/21/2020       Recent Labs     01/16/21  1131 01/16/21  1608 01/16/21  2143 01/17/21  0543   POCGLU 235* 214* 204* 186* Blood Sugar Average: Last 72 hrs:  (P) 419 [id number]     HbA1c within appropriate range  POC glucose slightly elevated since previous assessment     - Continue basal bolus insulin; current 18 units of glargine hs plus 3 units Humalog t i d  A c  plus sliding scale  Will continue to monitor and adjust as needed  - Continue to monitor POC glucose  History of breast cancer  Assessment & Plan  - Continue tamoxifen  VTE Pharmacologic Prophylaxis:   Pharmacologic: Heparin  Mechanical VTE Prophylaxis in Place: Yes    Patient Centered Rounds: I have performed bedside rounds with nursing staff today  Discussions with Specialists or Other Care Team Provider:  None  Education and Discussions with Family / Patient:  None  Time Spent for Care: 45 minutes  More than 50% of total time spent on counseling and coordination of care as described above  Current Length of Stay: 8 day(s)    Current Patient Status: Inpatient   Certification Statement: The patient will continue to require additional inpatient hospital stay due to Management of acute respiratory failure secondary to COVID-19 infection  Discharge Plan / Estimated Discharge Date:  Patient is requiring about 2 L of oxygen via nasal cannula for maintenance of adequate oxygen saturation  Will continue to titrate until she  achieves  this target /estimated discharge date on or before 2021  Code Status: Level 1 - Full Code      Subjective:   Patient interviewed today seated in chair in her room with necessary airborne precautions taken  Patient has no acute issues to report  She denies any headaches, dizziness, or fevers, chills, chest pain, palpitations, shortness of breath, abdominal pain, diarrhea or constipation      Objective:     Vitals:   Temp (24hrs), Av °F (36 7 °C), Min:97 6 °F (36 4 °C), Max:98 4 °F (36 9 °C)    Temp:  [97 6 °F (36 4 °C)-98 4 °F (36 9 °C)] 98 °F (36 7 °C)  HR:  [53-66] 66  Resp:  [18-19] 18  BP: (122-135)/(55-66) 129/66  SpO2:  [84 %-96 %] 94 %  Body mass index is 32 96 kg/m²  Input and Output Summary (last 24 hours): Intake/Output Summary (Last 24 hours) at 1/17/2021 0824  Last data filed at 1/17/2021 0601  Gross per 24 hour   Intake 630 ml   Output 400 ml   Net 230 ml       Physical Exam:     Physical Exam  Vitals signs reviewed  Constitutional:       Appearance: Normal appearance  She is obese  HENT:      Mouth/Throat:      Mouth: Mucous membranes are moist    Eyes:      General:         Right eye: No discharge  Left eye: No discharge  Conjunctiva/sclera: Conjunctivae normal    Neck:      Musculoskeletal: Neck supple  Cardiovascular:      Rate and Rhythm: Normal rate and regular rhythm  Heart sounds: S1 normal and S2 normal    Pulmonary:      Effort: Pulmonary effort is normal       Breath sounds: Normal breath sounds  Abdominal:      Palpations: Abdomen is soft  Musculoskeletal:         General: No swelling or tenderness  Skin:     General: Skin is warm and dry  Neurological:      General: No focal deficit present  Mental Status: She is alert     Psychiatric:         Mood and Affect: Mood normal          Behavior: Behavior normal            Additional Data:     Labs:    Results from last 7 days   Lab Units 01/17/21  0328 01/16/21  0536 01/15/21  0519   WBC Thousand/uL 9 91 8 00 10 18*   HEMOGLOBIN g/dL 12 8 13 5 14 0   HEMATOCRIT % 38 1 40 3 42 0   PLATELETS Thousands/uL 145* 147* 162   NEUTROS PCT %  --   --  85*   LYMPHS PCT %  --   --  13*   LYMPHO PCT %  --  3*  --    MONOS PCT %  --   --  1*   MONO PCT %  --  1*  --    EOS PCT %  --  0 0     Results from last 7 days   Lab Units 01/17/21  0328   POTASSIUM mmol/L 5 5*   CHLORIDE mmol/L 104   CO2 mmol/L 23   BUN mg/dL 35*   CREATININE mg/dL 1 08   CALCIUM mg/dL 8 6   ALK PHOS U/L 46   ALT U/L 64   AST U/L 30     Results from last 7 days   Lab Units 01/14/21  0522   INR  1 22*       * I Have Reviewed All Lab Data Listed Above  * Additional Pertinent Lab Tests Reviewed: All Labs Within Last 24 Hours Reviewed    Imaging:    Imaging Reports Reviewed Today Include:  None  Imaging Personally Reviewed by Myself Includes:  None  Recent Cultures (last 7 days):           Last 24 Hours Medication List:   Current Facility-Administered Medications   Medication Dose Route Frequency Provider Last Rate    acetaminophen  650 mg Oral Q6H PRN Luis Jimenez MD      aspirin  81 mg Oral Daily Luis Jimenez MD      cholecalciferol  2,000 Units Oral Daily Luis Jimenez MD      dexamethasone  6 mg Intravenous Q24H Luis Jimenez MD      diltiazem  120 mg Oral Q12H Tavo Gregorio MD      donepezil  10 mg Oral HS Luis Jimenez MD      heparin (porcine)  5,000 Units Subcutaneous Q8H [facility] Luis Jimenez MD      hydrocortisone   Topical BID Zbigniew LEOS MD      insulin glargine  18 Units Subcutaneous Daily With Lunch Mayi Munroe MD      insulin lispro  1-5 Units Subcutaneous TID AC Victor Manuel Hurd MD      insulin lispro  1-5 Units Subcutaneous HS MD Henrry Wilson insulin lispro  3 Units Subcutaneous TID With Meals MD Henrry Mora levothyroxine  137 mcg Oral Early Morning Luis Jimenez MD      losartan potassium-hydrochlorothiazide (HYZAAR 100/12  5) combo dose   Oral Daily Luis Jimenez MD      melatonin  6 mg Oral HS Zbigniew LEOS MD      memantine  10 mg Oral BID Luis Jimenez MD      multivitamin-minerals  1 tablet Oral Daily Luis Jimenez MD      OLANZapine  2 5 mg Intramuscular Q3H PRN Victor Manuel Hurd MD      ondansetron  4 mg Intravenous Q6H PRN Luis Jimenez MD      pravastatin  40 mg Oral Daily With Graceann Peabody, MD      tamoxifen  20 mg Oral BID Luis Jimenez MD          Today, Patient Was Seen By: Harini Myers MD    ** Please Note: Dragon 360 Dictation voice to text software may have been used in the creation of this document   **

## 2021-01-18 PROBLEM — Z74.09 IMMOBILITY: Status: ACTIVE | Noted: 2021-01-18

## 2021-01-18 LAB
ANION GAP SERPL CALCULATED.3IONS-SCNC: 8 MMOL/L (ref 4–13)
BUN SERPL-MCNC: 43 MG/DL (ref 5–25)
CALCIUM SERPL-MCNC: 8.7 MG/DL (ref 8.3–10.1)
CHLORIDE SERPL-SCNC: 102 MMOL/L (ref 100–108)
CO2 SERPL-SCNC: 25 MMOL/L (ref 21–32)
CREAT SERPL-MCNC: 1.11 MG/DL (ref 0.6–1.3)
GFR SERPL CREATININE-BSD FRML MDRD: 50 ML/MIN/1.73SQ M
GLUCOSE SERPL-MCNC: 158 MG/DL (ref 65–140)
GLUCOSE SERPL-MCNC: 164 MG/DL (ref 65–140)
GLUCOSE SERPL-MCNC: 168 MG/DL (ref 65–140)
GLUCOSE SERPL-MCNC: 207 MG/DL (ref 65–140)
GLUCOSE SERPL-MCNC: 224 MG/DL (ref 65–140)
POTASSIUM SERPL-SCNC: 4.5 MMOL/L (ref 3.5–5.3)
SODIUM SERPL-SCNC: 135 MMOL/L (ref 136–145)

## 2021-01-18 PROCEDURE — 97163 PT EVAL HIGH COMPLEX 45 MIN: CPT

## 2021-01-18 PROCEDURE — 97110 THERAPEUTIC EXERCISES: CPT

## 2021-01-18 PROCEDURE — 82948 REAGENT STRIP/BLOOD GLUCOSE: CPT

## 2021-01-18 PROCEDURE — 99232 SBSQ HOSP IP/OBS MODERATE 35: CPT | Performed by: STUDENT IN AN ORGANIZED HEALTH CARE EDUCATION/TRAINING PROGRAM

## 2021-01-18 PROCEDURE — 80048 BASIC METABOLIC PNL TOTAL CA: CPT | Performed by: STUDENT IN AN ORGANIZED HEALTH CARE EDUCATION/TRAINING PROGRAM

## 2021-01-18 RX ADMIN — HYDROCORTISONE: 25 CREAM TOPICAL at 17:43

## 2021-01-18 RX ADMIN — MEMANTINE 10 MG: 10 TABLET ORAL at 17:42

## 2021-01-18 RX ADMIN — PRAVASTATIN SODIUM 40 MG: 40 TABLET ORAL at 15:46

## 2021-01-18 RX ADMIN — HYDROCORTISONE: 25 CREAM TOPICAL at 09:43

## 2021-01-18 RX ADMIN — INSULIN LISPRO 1 UNITS: 100 INJECTION, SOLUTION INTRAVENOUS; SUBCUTANEOUS at 07:43

## 2021-01-18 RX ADMIN — INSULIN LISPRO 3 UNITS: 100 INJECTION, SOLUTION INTRAVENOUS; SUBCUTANEOUS at 17:42

## 2021-01-18 RX ADMIN — INSULIN LISPRO 1 UNITS: 100 INJECTION, SOLUTION INTRAVENOUS; SUBCUTANEOUS at 17:42

## 2021-01-18 RX ADMIN — TAMOXIFEN CITRATE 20 MG: 10 TABLET ORAL at 09:43

## 2021-01-18 RX ADMIN — LEVOTHYROXINE SODIUM 137 MCG: 25 TABLET ORAL at 05:06

## 2021-01-18 RX ADMIN — LOSARTAN POTASSIUM: 50 TABLET, FILM COATED ORAL at 09:44

## 2021-01-18 RX ADMIN — HEPARIN SODIUM 5000 UNITS: 5000 INJECTION INTRAVENOUS; SUBCUTANEOUS at 22:04

## 2021-01-18 RX ADMIN — INSULIN LISPRO 1 UNITS: 100 INJECTION, SOLUTION INTRAVENOUS; SUBCUTANEOUS at 11:35

## 2021-01-18 RX ADMIN — DONEPEZIL HYDROCHLORIDE 10 MG: 5 TABLET ORAL at 22:03

## 2021-01-18 RX ADMIN — DILTIAZEM HYDROCHLORIDE 120 MG: 60 CAPSULE, EXTENDED RELEASE ORAL at 20:19

## 2021-01-18 RX ADMIN — DEXAMETHASONE SODIUM PHOSPHATE 6 MG: 4 INJECTION INTRA-ARTICULAR; INTRALESIONAL; INTRAMUSCULAR; INTRAVENOUS; SOFT TISSUE at 20:19

## 2021-01-18 RX ADMIN — INSULIN LISPRO 3 UNITS: 100 INJECTION, SOLUTION INTRAVENOUS; SUBCUTANEOUS at 07:44

## 2021-01-18 RX ADMIN — HEPARIN SODIUM 5000 UNITS: 5000 INJECTION INTRAVENOUS; SUBCUTANEOUS at 14:45

## 2021-01-18 RX ADMIN — ASPIRIN 81 MG: 81 TABLET, COATED ORAL at 09:44

## 2021-01-18 RX ADMIN — Medication 2000 UNITS: at 09:44

## 2021-01-18 RX ADMIN — INSULIN GLARGINE 18 UNITS: 100 INJECTION, SOLUTION SUBCUTANEOUS at 12:24

## 2021-01-18 RX ADMIN — INSULIN LISPRO 1 UNITS: 100 INJECTION, SOLUTION INTRAVENOUS; SUBCUTANEOUS at 22:04

## 2021-01-18 RX ADMIN — DILTIAZEM HYDROCHLORIDE 120 MG: 60 CAPSULE, EXTENDED RELEASE ORAL at 09:45

## 2021-01-18 RX ADMIN — MELATONIN 6 MG: at 22:03

## 2021-01-18 RX ADMIN — TAMOXIFEN CITRATE 20 MG: 10 TABLET ORAL at 17:43

## 2021-01-18 RX ADMIN — HEPARIN SODIUM 5000 UNITS: 5000 INJECTION INTRAVENOUS; SUBCUTANEOUS at 05:07

## 2021-01-18 RX ADMIN — Medication 1 TABLET: at 09:45

## 2021-01-18 RX ADMIN — MEMANTINE 10 MG: 10 TABLET ORAL at 09:44

## 2021-01-18 RX ADMIN — INSULIN LISPRO 3 UNITS: 100 INJECTION, SOLUTION INTRAVENOUS; SUBCUTANEOUS at 12:36

## 2021-01-18 NOTE — PLAN OF CARE
Problem: Potential for Falls  Goal: Patient will remain free of falls  Description: INTERVENTIONS:  - Assess patient frequently for physical needs  -  Identify cognitive and physical deficits and behaviors that affect risk of falls    -  [place] fall precautions as indicated by assessment   - Educate patient/family on patient safety including physical limitations  - Instruct patient to call for assistance with activity based on assessment  - Modify environment to reduce risk of injury  - Consider OT/PT consult to assist with strengthening/mobility  Outcome: Progressing     Problem: PAIN - ADULT  Goal: Verbalizes/displays adequate comfort level or baseline comfort level  Description: Interventions:  - Encourage patient to monitor pain and request assistance  - Assess pain using appropriate pain scale  - Administer analgesics based on type and severity of pain and evaluate response  - Implement non-pharmacological measures as appropriate and evaluate response  - Consider cultural and social influences on pain and pain management  - Notify physician/advanced practitioner if interventions unsuccessful or patient reports new pain  Outcome: Progressing     Problem: INFECTION - ADULT  Goal: Absence or prevention of progression during hospitalization  Description: INTERVENTIONS:  - Assess and monitor for signs and symptoms of infection  - Monitor lab/diagnostic results  - Monitor all insertion sites, i e  indwelling lines, tubes, and drains  - Monitor endotracheal if appropriate and nasal secretions for changes in amount and color  - [place] appropriate cooling/warming therapies per order  - Administer medications as ordered  - Instruct and encourage patient and family to use good hand hygiene technique  - Identify and instruct in appropriate isolation precautions for identified infection/condition  Outcome: Progressing  Goal: Absence of fever/infection during neutropenic period  Description: INTERVENTIONS:  - Monitor WBC    Outcome: Progressing     Problem: RESPIRATORY - ADULT  Goal: Achieves optimal ventilation and oxygenation  Description: INTERVENTIONS:  - Assess for changes in respiratory status  - Assess for changes in mentation and behavior  - Position to facilitate oxygenation and minimize respiratory effort  - Oxygen administered by appropriate delivery if ordered  - Initiate smoking cessation education as indicated  - Encourage broncho-pulmonary hygiene including cough, deep breathe, Incentive Spirometry  - Assess the need for suctioning and aspirate as needed  - Assess and instruct to report SOB or any respiratory difficulty  - Respiratory Therapy support as indicated  Outcome: Progressing     Problem: METABOLIC, FLUID AND ELECTROLYTES - ADULT  Goal: Electrolytes maintained within normal limits  Description: INTERVENTIONS:  - Monitor labs and assess patient for signs and symptoms of electrolyte imbalances  - Administer electrolyte replacement as ordered  - Monitor response to electrolyte replacements, including repeat lab results as appropriate  - Instruct patient on fluid and nutrition as appropriate  Outcome: Progressing  Goal: Fluid balance maintained  Description: INTERVENTIONS:  - Monitor labs   - Monitor I/O and WT  - Instruct patient on fluid and nutrition as appropriate  - Assess for signs & symptoms of volume excess or deficit  Outcome: Progressing     Problem: HEMATOLOGIC - ADULT  Goal: Maintains hematologic stability  Description: INTERVENTIONS  - Assess for signs and symptoms of bleeding or hemorrhage  - Monitor labs  - Administer supportive blood products/factors as ordered and appropriate  Outcome: Progressing     Problem: Nutrition/Hydration-ADULT  Goal: Nutrient/Hydration intake appropriate for improving, restoring or maintaining nutritional needs  Description: Monitor and assess patient's nutrition/hydration status for malnutrition   Collaborate with interdisciplinary team and initiate plan and interventions as ordered  Monitor patient's weight and dietary intake as ordered or per policy  Utilize nutrition screening tool and intervene as necessary  Determine patient's food preferences and provide high-protein, high-caloric foods as appropriate       INTERVENTIONS:  - Monitor oral intake, urinary output, labs, and treatment plans  - Assess nutrition and hydration status and recommend course of action  - Evaluate amount of meals eaten  - Assist patient with eating if necessary   - Allow adequate time for meals  - Recommend/ encourage appropriate diets, oral nutritional supplements, and vitamin/mineral supplements  - Order, calculate, and assess calorie counts as needed  - Assess need for intravenous fluids  - Provide nutrition/hydration education as appropriate  - Include patient/family/caregiver in decisions related to nutrition  Outcome: Progressing     Problem: Prexisting or High Potential for Compromised Skin Integrity  Goal: Skin integrity is maintained or improved  Description: INTERVENTIONS:  - Identify patients at risk for skin breakdown  - Assess and monitor skin integrity  - Assess and monitor nutrition and hydration status  - Monitor labs   - Assess for incontinence   - Turn and reposition patient  - Assist with mobility/ambulation  - Relieve pressure over bony prominences  - Avoid friction and shearing  - Provide appropriate hygiene as needed including keeping skin clean and dry  - Evaluate need for skin moisturizer/barrier cream  - Collaborate with interdisciplinary team   - Patient/family teaching  - Consider wound care consult   Outcome: Progressing

## 2021-01-18 NOTE — ASSESSMENT & PLAN NOTE
Patient ambulates with walker at baseline  Currently on day 9 of admission, with minimal ambulation  Patient's age, comorbidities duration of convalescence placed her at increased risk for deconditioning     - Follow-up PT/OT evaluation

## 2021-01-18 NOTE — ASSESSMENT & PLAN NOTE
BP stable  - Continue antihypertensives  Hyzaar held yesterday for hyperkalemia  Potassium level back into normal range on today's lab results  Will continue resume hyzaar   - Monitor vitals  Q shift

## 2021-01-18 NOTE — PLAN OF CARE
Problem: PHYSICAL THERAPY ADULT  Goal: Performs mobility at highest level of function for planned discharge setting  See evaluation for individualized goals  Description: Treatment/Interventions: Functional transfer training, LE strengthening/ROM, Endurance training, Therapeutic exercise, Patient/family training, Equipment eval/education, Bed mobility, Gait training, Spoke to nursing, Spoke to MD  Equipment Recommended: (continue with RW)       See flowsheet documentation for full assessment, interventions and recommendations  Note: Prognosis: Fair  Problem List: Decreased strength, Decreased endurance, Impaired balance, Decreased mobility, Decreased cognition, Decreased safety awareness  Assessment: Pt is 69 y o  female seen for PT evaluation on 1/18/2021 s/p admit to [facility] on 1/9/2021 w/ COVID-19  PT consulted to assess pt's functional mobility and d/c needs  Order placed for PT eval and tx, w/ activity as tolerated order  Performed at least 2 patient identifiers during session: Name and wristband  Comorbidities affecting pt's physical performance at time of assessment include:  has a past medical history of Arthritis, Breast cancer ([facility] [address] ), [facility] ), Coronary artery disease, Diabetes mellitus ([facility] ), GERD (gastroesophageal reflux disease), History of transfusion, Hyperlipidemia, Hypertension, Hypothyroidism, Memory difficulties, Psychiatric disorder, and Shortness of breath  Pt questionable historian, CM to follow up; part of PLOF/home environment info obtained from chart review  PTA, pt was independent w/ all functional mobility w/ walker?, ambulates unrestricted distances and all terrain, has 2 SHAILA and lives w/ daughter and family in 1 level home   Personal factors affecting pt at time of IE include: inaccessible home environment, ambulating w/ assistive device, inability to ambulate household distances, inability to navigate community distances, decreased cognition and decreased initiation and engagement  Please find objective findings from PT assessment regarding body systems outlined above with impairments and limitations including weakness, impaired balance, decreased endurance, gait deviations, decreased activity tolerance, decreased safety awareness, fall risk and decreased cognition, as well as mobility assessment (need for cueing for mobility technique)  The following objective measures performed on IE also reveal limitations: Barthel Index: 40/100 and Modified [place]: 4 (moderate/severe disability)  Pt's clinical presentation is currently unstable/unpredictable seen in pt's presentation of abnormal lab value(s), need for input for task focus and mobility technique and ongoing medical assessment  Pt to benefit from continued PT tx to address deficits as defined above and maximize level of functional independent mobility and consistency  From PT/mobility standpoint, recommendation at time of d/c would be STR pending progress in order to facilitate return to PLOF  Barriers to Discharge: Inaccessible home environment, Decreased caregiver support     PT Discharge Recommendation: [id number]     PT - OK to Discharge: No    See flowsheet documentation for full assessment

## 2021-01-18 NOTE — PROGRESS NOTES
Progress Note Mae Hurt [birth year], 69 y o  female MRN: [id number]    Unit/Bed#: -01 Encounter: [id number]    Primary Care Provider: Amaris Pineda MD   Date and time admitted to hospital: 1/9/2021  3:20 PM        * COVID-19  Assessment & Plan  No respiratory distress  Patient currently saturating at 92% on 3 liters/minute of oxygen via nasal cannula  Afebrile, hemodynamically stable  Currently on day 10 of mild COVID management  Slow Recovery  Oxygen saturations dropped to 79% on 3L/min of oxygen while ambulating with PT     - Continue mild COVID treatment protocol; completed 5-day course of remdesivir  Continue dexamethasone 6 mg q24h; Currently on day 10/10  Heparin for VTE prophylaxis, vitamin-C, D and Zn   - Continue to monitor respiratory status; routine pulse-oximetry, titrate oxygen to a saturation of 93- 95%  Goal of maximum requirement of 2L/min via NC for discharge planning   - Encourage patient to continue incentive spirometry in addition to self-proning  Acute respiratory failure with hypoxia [facility])  Assessment & Plan  See assessment and management for COVID-19 infection  Acute encephalopathy  Assessment & Plan  Patient presented with worsening disorientation  Likely secondary to toxic encephalopathy superimposed on baseline dementia  Periodically confused but currently patient is AAOx3  - Acute encephalopathy resolved  Baseline dementia remains  Dementia [facility])  Assessment & Plan  Reported to be periodically disoriented  AAOx3 on today's encounter     - Continue donepezil and memantine routine   - Continue olanzapine prn for agitation, psychosis  - Redirect as needed  Hypertension  Assessment & Plan  BP stable  - Continue antihypertensives  Hyzaar held yesterday for hyperkalemia  Potassium level back into normal range on today's lab results  Will continue resume hyzaar   - Monitor vitals  Q shift      Type 2 diabetes mellitus [facility])  Assessment & Plan  Lab Results   Component Value Date    HGBA1C 5 9 (H) 08/21/2020       Recent Labs     01/17/21  1635 01/17/21  2042 01/18/21  0505 01/18/21  1057   POCGLU 160* 104 164* 224*       Blood Sugar Average: Last 72 hrs:  (P) 199 [id number]     HbA1c within appropriate range  POC glucose slightly elevated since previous assessment     - Continue basal bolus insulin; current 18 units of glargine hs plus 3 units Humalog t i d  A c  plus sliding scale  Will continue to monitor and adjust as needed  - Continue to monitor POC glucose  History of breast cancer  Assessment & Plan  - Continue tamoxifen  Immobility  Assessment & Plan  Patient ambulates with walker at baseline  Currently on day 9 of admission, with minimal ambulation  Patient's age, comorbidities duration of convalescence placed her at increased risk for deconditioning     - Follow-up PT/OT evaluation  VTE Pharmacologic Prophylaxis:   Pharmacologic: Heparin  Mechanical VTE Prophylaxis in Place: Yes    Patient Centered Rounds: I have performed bedside rounds with nursing staff today  Discussions with Specialists or Other Care Team Provider:  None  Education and Discussions with Family / Patient:  None  Time Spent for Care: 45 minutes  More than 50% of total time spent on counseling and coordination of care as described above  Current Length of Stay: 9 day(s)    Current Patient Status: Inpatient   Certification Statement: The patient will continue to require additional inpatient hospital stay due to Management of acute respiratory failure secondary to COVID-19 pneumonia  Discharge Plan / Estimated Discharge Date: Will attempt to titrate patient's O2 requirements to her baseline, maximum of 2 L supplementation per minute via nasal cannula for discharge /estimated discharge date on or before 1/22/2021        Code Status: Level 1 - Full Code      Subjective:   Patient interviewed today at the bedside with necessary airborne precautions taken  Patient states that she feels mildly short of breath rest, has not ambulated  Patient denies any fevers, chills chest pain, palpitations, nausea or  Diarrhea  Objective:     Vitals:   Temp (24hrs), Av 4 °F (36 9 °C), Min:97 8 °F (36 6 °C), Max:98 9 °F (37 2 °C)    Temp:  [97 8 °F (36 6 °C)-98 9 °F (37 2 °C)] 97 8 °F (36 6 °C)  HR:  [57-76] 62  Resp:  [18-21] 20  BP: (105-128)/(52-81) 128/52  SpO2:  [89 %-94 %] 94 %  Body mass index is 32 92 kg/m²  Input and Output Summary (last 24 hours): Intake/Output Summary (Last 24 hours) at 2021 1431  Last data filed at 2021 0920  Gross per 24 hour   Intake 240 ml   Output    Net 240 ml       Physical Exam:     Physical Exam  Vitals signs reviewed  Constitutional:       Appearance: Normal appearance  She is obese  HENT:      Nose:      Comments: Nares patent  Nasal cannula in-situ delivering 3 L oxygen/ min  Mouth/Throat:      Mouth: Mucous membranes are moist    Eyes:      General:         Right eye: No discharge  Left eye: No discharge  Conjunctiva/sclera: Conjunctivae normal    Neck:      Musculoskeletal: Neck supple  Cardiovascular:      Rate and Rhythm: Normal rate and regular rhythm  Heart sounds: S1 normal and S2 normal    Pulmonary:      Effort: Pulmonary effort is normal  No tachypnea or accessory muscle usage  Breath sounds: Normal breath sounds  Abdominal:      Palpations: Abdomen is soft  Musculoskeletal:         General: No swelling or tenderness  Skin:     General: Skin is warm and dry  Neurological:      General: No focal deficit present  Mental Status: She is alert  She is disoriented  Comments: AAO x 2  Disoriented to time     Psychiatric:         Mood and Affect: Mood normal          Behavior: Behavior normal          Additional Data:     Labs:    Results from last 7 days   Lab Units 21  0328 21  0536 01/15/21  0519   WBC Thousand/uL 9 91 8 00 10 18* HEMOGLOBIN g/dL 12 8 13 5 14 0   HEMATOCRIT % 38 1 40 3 42 0   PLATELETS Thousands/uL 145* 147* 162   NEUTROS PCT %  --   --  85*   LYMPHS PCT %  --   --  13*   LYMPHO PCT %  --  3*  --    MONOS PCT %  --   --  1*   MONO PCT %  --  1*  --    EOS PCT %  --  0 0     Results from last 7 days   Lab Units 01/18/21  0503 01/17/21  0328   POTASSIUM mmol/L 4 5 5 5*   CHLORIDE mmol/L 102 104   CO2 mmol/L 25 23   BUN mg/dL 43* 35*   CREATININE mg/dL 1 11 1 08   CALCIUM mg/dL 8 7 8 6   ALK PHOS U/L  --  46   ALT U/L  --  64   AST U/L  --  30     Results from last 7 days   Lab Units 01/14/21  0522   INR  1 22*       * I Have Reviewed All Lab Data Listed Above  * Additional Pertinent Lab Tests Reviewed: All Labs Within Last 24 Hours Reviewed    Imaging:    Imaging Reports Reviewed Today Include: None  Imaging Personally Reviewed by Myself Includes:  None  Recent Cultures (last 7 days):           Last 24 Hours Medication List:   Current Facility-Administered Medications   Medication Dose Route Frequency Provider Last Rate    acetaminophen  650 mg Oral Q6H PRN Ching Gil MD      aspirin  81 mg Oral Daily Ching Gil MD      cholecalciferol  2,000 Units Oral Daily Ching Gil MD      dexamethasone  6 mg Intravenous Q24H Ching Gil MD      diltiazem  120 mg Oral Q12H Karla Dow MD      donepezil  10 mg Oral HS Ching Gil MD      heparin (porcine)  5,000 Units Subcutaneous Q8H [address] Chign Gil MD      hydrocortisone   Topical BID Zbigniew LEOS MD      insulin glargine  18 Units Subcutaneous Daily With Lunch Fadia Contreras MD      insulin lispro  1-5 Units Subcutaneous TID AC Mindi Corona MD      insulin lispro  1-5 Units Subcutaneous HS Mindi Cornoa MD     [facility] insulin lispro  3 Units Subcutaneous TID With Meals Fadia Contreras MD      levothyroxine  137 mcg Oral Early Morning Ching Gil MD      losartan potassium-hydrochlorothiazide (HYZAAR 50/12  5) combination   Oral Daily Zbigniew LEOS MD      melatonin  6 mg Oral HS Zbigniew LEOS MD      memantine  10 mg Oral BID Alber Haines MD      multivitamin-minerals  1 tablet Oral Daily Alber Haines MD      OLANZapine  2 5 mg Intramuscular Q3H PRN Lord Ector MD      ondansetron  4 mg Intravenous Q6H PRN Alber Haines MD      pravastatin  40 mg Oral Daily With Analilia Mazariegos MD      tamoxifen  20 mg Oral BID Alber Haines MD          Today, Patient Was Seen By: Julianna Montes MD    ** Please Note: Dragon 360 Dictation voice to text software may have been used in the creation of this document   **

## 2021-01-18 NOTE — PHYSICAL THERAPY NOTE
Physical Therapy Evaluation     Patient's Name: Jessika Anderson    Admitting Diagnosis  Confusion [R41 0]  SOB (shortness of breath) [R06 02]  Hypoxia [R09 02]  COVID-19 [U07 1]    Problem List  Patient Active Problem List   Diagnosis    COVID-19    Hypertension    Dementia [facility] )    History of breast cancer    Acute [facility] failure with hypoxia ([facility] )    Acute encephalopathy    Type 2 diabetes mellitus ([place] [address] )    Immobility       Past Medical History  Past Medical History:   Diagnosis Date    Arthritis     Breast cancer ([facility] )     Cancer ([place] [address] )     breast    Coronary artery disease     Diabetes mellitus ([place] [address] )     GERD (gastroesophageal reflux disease)     History of transfusion     Hyperlipidemia     Hypertension     Hypothyroidism     Memory difficulties     Psychiatric disorder     Shortness of breath        Past Surgical History  Past Surgical History:   Procedure Laterality Date    APPENDECTOMY      BREAST SURGERY       SECTION      HYSTERECTOMY      MASTECTOMY Left     AR ESOPHAGOGASTRODUODENOSCOPY TRANSORAL DIAGNOSTIC N/A 5/10/2017    Procedure: ESOPHAGOGASTRODUODENOSCOPY (EGD); Surgeon: Lewis Madera MD;  Location: [facility];   Service: Gastroenterology    TONSILLECTOMY            21 1330   PT Last Visit   PT Visit Date 21   Note Type   Note type Evaluation   Pain Assessment   Pain Assessment Tool Pain Assessment not indicated - pt denies pain   Pain Score No Pain   Home Living   Type of [address] [place] [address] One level;Stairs to enter with rails  (pt unsure how many floors)   [address]  (pt reports using walker at baseline)   Additional Comments pt questionable historian d/t baseline dementia, CM To follow up  per chart review, pt resides with dtr and her family in ranch style home with 2 SHAILA   Prior Function   Level of [place] Independent with ADLs and functional mobility   Lives With Daughter;Family   Receives Help From Family   ADL Assistance Independent   IADLs Needs assistance   Falls in the last 6 months 0  (pt denies any falls, unsure of history)   Restrictions/Precautions   Weight Bearing Precautions Per Order No   Other Precautions O2;Fall Risk;Hard of hearing;Cognitive;Contact/isolation; Airborne/isolation; Chair Alarm; Bed Alarm;Limb alert  (+COVID-19 confirmed  3L O2 NC)   General   Family/Caregiver Present No   Cognition   Overall Cognitive Status Impaired   Arousal/Participation Alert   Orientation Level Oriented to person;Oriented to place; Disoriented to time;Disoriented to situation  ("[facility]")   Memory Decreased recall of biographical information;Decreased short term memory;Decreased recall of recent events;Decreased recall of precautions   Following Commands Follows one step commands with increased time or repetition   Comments pt agreeable to PT evaluation   RUE Assessment   RUE Assessment   (AROM WFL: strength grossly 3+/5)   LUE Assessment   LUE Assessment   (AROM WFL: strength grossly 3+/5)   RLE Assessment   RLE Assessment   (AROM WFL: strength grossly 3+/5)   LLE Assessment   LLE Assessment   (AROM WFL: strength grossly 3+/5)   Coordination   Movements are Fluid and Coordinated 1   Sensation WFL   Bed Mobility   Sit to Supine 4  Minimal assistance   Additional items Assist x 1;HOB elevated; Increased time required;Verbal cues;LE management   Additional Comments pt received OOB to recliner upon arrival   Transfers   Sit to Stand 4  Minimal assistance   Additional items Assist x 1; Armrests; Increased time required;Verbal cues   Stand to Sit 4  Minimal assistance   Additional items Assist x 1; Armrests; Increased time required;Verbal cues   Additional Comments SpO2 down to 79% on 3L O2 NC, pt requiring 3 min rest break for recovery, to bring SpO2 up >90% on 4L O2 NC   Ambulation/Elevation   Gait pattern Decreased foot clearance; Short stride; Step to   Gait Assistance 4  Minimal assist   Additional items Assist x 1;Verbal cues   Assistive Device Rolling walker   Distance 10'   Stair Management Assistance Not tested   Balance   Static Sitting Good   Dynamic Sitting Fair   Static Standing Fair -   Dynamic Standing Poor +   Ambulatory Poor +   Endurance Deficit   Endurance Deficit Yes   Activity Tolerance   Activity Tolerance Patient limited by fatigue; Other (Comment)  (SOB/DUGAN  decreased SPO2)   Medical Staff Made Aware Dr Yudith Bee verbalized pt appropriate to see, made aware of session outcome/recs   Assessment   Prognosis Fair   Problem List Decreased strength;Decreased endurance; Impaired balance;Decreased mobility; Decreased cognition;Decreased safety awareness   Assessment Pt is 69 y o  female seen for PT evaluation on 1/18/2021 s/p admit to [facility] on 1/9/2021 w/ COVID-19  PT consulted to assess pt's functional mobility and d/c needs  Order placed for PT eval and tx, w/ activity as tolerated order  Performed at least 2 patient identifiers during session: Name and wristband  Comorbidities affecting pt's physical performance at time of assessment include:  has a past medical history of Arthritis, Breast cancer ([facility] ), Cancer ([facility] ), Coronary artery disease, Diabetes mellitus ([facility] ), GERD (gastroesophageal reflux disease), History of transfusion, Hyperlipidemia, Hypertension, Hypothyroidism, Memory difficulties, Psychiatric disorder, and Shortness of breath  Pt questionable historian, CM to follow up; part of PLOF/home environment info obtained from chart review  PTA, pt was independent w/ all functional mobility w/ walker?, ambulates unrestricted distances and all terrain, has 2 SHAILA and lives w/ daughter and family in 1 level home  Personal factors affecting pt at time of IE include: inaccessible home environment, ambulating w/ assistive device, inability to ambulate household distances, inability to navigate community distances, decreased cognition and decreased initiation and engagement   Please find objective findings from PT assessment regarding body systems outlined above with impairments and limitations including weakness, impaired balance, decreased endurance, gait deviations, decreased activity tolerance, decreased safety awareness, fall risk and decreased cognition, as well as mobility assessment (need for cueing for mobility technique)  The following objective measures performed on IE also reveal limitations: Barthel Index: 40/100 and Modified [place]: 4 (moderate/severe disability)  Pt's clinical presentation is currently unstable/unpredictable seen in pt's presentation of abnormal lab value(s), need for input for task focus and mobility technique and ongoing medical assessment  Pt to benefit from continued PT tx to address deficits as defined above and maximize level of functional independent mobility and consistency  From PT/mobility standpoint, recommendation at time of d/c would be STR pending progress in order to facilitate return to PLOF  Barriers to Discharge Inaccessible home environment;Decreased caregiver support   Goals   Patient Goals to rest   STG Expiration Date 01/28/21   Short Term Goal #1 In 7-10 days: Increase bilateral LE strength 1/2 grade to facilitate independent mobility, Perform all bed mobility tasks modified independent to decrease caregiver burden, Perform all transfers modified independent to improve independence, Ambulate > 50 ft  x2 with least restrictive assistive device with SBA w/o LOB and w/ normalized gait pattern 100% of the time, Navigate 2 stairs with min A of 1 with unilateral handrail to facilitate return to previous living environment, Increase all balance 1/2 grade to decrease risk for falls, Improve Barthel Index score to 55 or greater to facilitate independence and PT provider will perform functional balance assessment to determine fall risk   PT Treatment Day 0   Plan   Treatment/Interventions Functional transfer training;LE strengthening/ROM; Endurance training; Therapeutic exercise;Patient/family training;Equipment eval/education; Bed mobility;Gait training;Spoke to nursing;Spoke to MD   PT Frequency   (3-5x/wk)   Recommendation   PT Discharge Recommendation Post-Acute Rehabilitation Services   Equipment Recommended   (continue with RW)   PT - OK to Discharge No   AM-PAC Basic Mobility Inpatient   Turning in Bed Without Bedrails 3   Lying on Back to Sitting on Edge of Flat Bed 3   Moving Bed to Chair 3   Standing Up From Chair 3   Walk in Room 3   Climb 3-5 Stairs 1   Basic Mobility Inpatient Raw Score 16   Basic Mobility Standardized Score 38 32   Modified Fred Scale   Modified Fred Scale 4   Barthel Index   Feeding 10   Bathing 0   Grooming Score 0   Dressing Score 5   Bladder Score 0   Bowels Score 10   Toilet Use Score 5   Transfers (Bed/Chair) Score 10   Mobility (Level Surface) Score 0   Stairs Score 0   Barthel Index Score 40         Anatoliy Ramirez, PT, DPT    Physical Therapy Treatment Note  Time In: 1343  Time Out: 1355  Total Time: 12 min     S:  Pt agreeable to PT treatment session s/p PT eval, in no apparent distress and responsive      O:  Pt seen for PT treatment session this date with interventions consisting of Therapeutic exercise consisting of: AROM 10 reps B LE in sitting position  No pain reported throughout exercise reps  VC required for technique      A:  Pt able to initiate B LE exercises including ankle pumps, hip abduction/ adduction to midline, heel slides x10 reps  VC for technique  Post session: pt returned BTB, bed alarm engaged, all needs in reach and RN notified of session findings/recommendations      P:  Continue to recommend STR at time of d/c in order to maximize pt's functional independence and safety w/ mobility  Pt continues to be functioning below baseline level, and remains limited 2* factors listed above   PT will continue to see pt while here in order to address the deficits listed above and provide interventions consistent w/ POC in effort to achieve STGs      Reche Kin, PT, DPT

## 2021-01-18 NOTE — ASSESSMENT & PLAN NOTE
No respiratory distress  Patient currently saturating at 92% on 3 liters/minute of oxygen via nasal cannula  Afebrile, hemodynamically stable  Currently on day 10 of mild COVID management  Slow Recovery  Oxygen saturations dropped to 79% on 3L/min of oxygen while ambulating with PT     - Continue mild COVID treatment protocol; completed 5-day course of remdesivir  Continue dexamethasone 6 mg q24h; Currently on day 10/10  Heparin for VTE prophylaxis, vitamin-C, D and Zn   - Continue to monitor respiratory status; routine pulse-oximetry, titrate oxygen to a saturation of 93- 95%  Goal of maximum requirement of 2L/min via NC for discharge planning   - Encourage patient to continue incentive spirometry in addition to self-proning

## 2021-01-18 NOTE — ASSESSMENT & PLAN NOTE
Lab Results   Component Value Date    HGBA1C 5 9 (H) 08/21/2020       Recent Labs     01/17/21  1635 01/17/21  2042 01/18/21  0505 01/18/21  1057   POCGLU 160* 104 164* 224*       Blood Sugar Average: Last 72 hrs:  (P) 199 [id number]     HbA1c within appropriate range  POC glucose slightly elevated since previous assessment     - Continue basal bolus insulin; current 18 units of glargine hs plus 3 units Humalog t i d  A c  plus sliding scale  Will continue to monitor and adjust as needed  - Continue to monitor POC glucose

## 2021-01-19 LAB
ANION GAP SERPL CALCULATED.3IONS-SCNC: 7 MMOL/L (ref 4–13)
BASOPHILS # BLD AUTO: 0.01 THOUSANDS/ΜL (ref 0–0.1)
BASOPHILS NFR BLD AUTO: 0 % (ref 0–1)
BUN SERPL-MCNC: 44 MG/DL (ref 5–25)
CALCIUM SERPL-MCNC: 8.6 MG/DL (ref 8.3–10.1)
CHLORIDE SERPL-SCNC: 103 MMOL/L (ref 100–108)
CO2 SERPL-SCNC: 25 MMOL/L (ref 21–32)
CREAT SERPL-MCNC: 1.04 MG/DL (ref 0.6–1.3)
EOSINOPHIL # BLD AUTO: 0 THOUSAND/ΜL (ref 0–0.61)
EOSINOPHIL NFR BLD AUTO: 0 % (ref 0–6)
ERYTHROCYTE [DISTWIDTH] IN BLOOD BY AUTOMATED COUNT: 13 % (ref 11.6–15.1)
GFR SERPL CREATININE-BSD FRML MDRD: 54 ML/MIN/1.73SQ M
GLUCOSE SERPL-MCNC: 156 MG/DL (ref 65–140)
GLUCOSE SERPL-MCNC: 204 MG/DL (ref 65–140)
GLUCOSE SERPL-MCNC: 204 MG/DL (ref 65–140)
GLUCOSE SERPL-MCNC: 211 MG/DL (ref 65–140)
GLUCOSE SERPL-MCNC: 282 MG/DL (ref 65–140)
HCT VFR BLD AUTO: 36.5 % (ref 34.8–46.1)
HGB BLD-MCNC: 12.2 G/DL (ref 11.5–15.4)
IMM GRANULOCYTES # BLD AUTO: 0.07 THOUSAND/UL (ref 0–0.2)
IMM GRANULOCYTES NFR BLD AUTO: 1 % (ref 0–2)
LYMPHOCYTES # BLD AUTO: 0.69 THOUSANDS/ΜL (ref 0.6–4.47)
LYMPHOCYTES NFR BLD AUTO: 9 % (ref 14–44)
MCH RBC QN AUTO: 34.4 PG (ref 26.8–34.3)
MCHC RBC AUTO-ENTMCNC: 33.4 G/DL (ref 31.4–37.4)
MCV RBC AUTO: 103 FL (ref 82–98)
MONOCYTES # BLD AUTO: 0.06 THOUSAND/ΜL (ref 0.17–1.22)
MONOCYTES NFR BLD AUTO: 1 % (ref 4–12)
NEUTROPHILS # BLD AUTO: 7.31 THOUSANDS/ΜL (ref 1.85–7.62)
NEUTS SEG NFR BLD AUTO: 89 % (ref 43–75)
NRBC BLD AUTO-RTO: 0 /100 WBCS
PLATELET # BLD AUTO: 128 THOUSANDS/UL (ref 149–390)
PMV BLD AUTO: 9.4 FL (ref 8.9–12.7)
POTASSIUM SERPL-SCNC: 4.7 MMOL/L (ref 3.5–5.3)
RBC # BLD AUTO: 3.55 MILLION/UL (ref 3.81–5.12)
SODIUM SERPL-SCNC: 135 MMOL/L (ref 136–145)
WBC # BLD AUTO: 8.14 THOUSAND/UL (ref 4.31–10.16)

## 2021-01-19 PROCEDURE — 97116 GAIT TRAINING THERAPY: CPT

## 2021-01-19 PROCEDURE — 99232 SBSQ HOSP IP/OBS MODERATE 35: CPT | Performed by: INTERNAL MEDICINE

## 2021-01-19 PROCEDURE — 97110 THERAPEUTIC EXERCISES: CPT

## 2021-01-19 PROCEDURE — 82948 REAGENT STRIP/BLOOD GLUCOSE: CPT

## 2021-01-19 PROCEDURE — 85025 COMPLETE CBC W/AUTO DIFF WBC: CPT | Performed by: INTERNAL MEDICINE

## 2021-01-19 PROCEDURE — 94761 N-INVAS EAR/PLS OXIMETRY MLT: CPT

## 2021-01-19 PROCEDURE — 97167 OT EVAL HIGH COMPLEX 60 MIN: CPT

## 2021-01-19 PROCEDURE — 80048 BASIC METABOLIC PNL TOTAL CA: CPT | Performed by: INTERNAL MEDICINE

## 2021-01-19 RX ORDER — HYDROCORTISONE 25 MG/G
CREAM TOPICAL 2 TIMES DAILY
Qty: 1 TUBE | Refills: 0 | Status: CANCELLED | OUTPATIENT
Start: 2021-01-19

## 2021-01-19 RX ADMIN — HEPARIN SODIUM 5000 UNITS: 5000 INJECTION INTRAVENOUS; SUBCUTANEOUS at 21:40

## 2021-01-19 RX ADMIN — INSULIN LISPRO 1 UNITS: 100 INJECTION, SOLUTION INTRAVENOUS; SUBCUTANEOUS at 09:08

## 2021-01-19 RX ADMIN — DILTIAZEM HYDROCHLORIDE 120 MG: 60 CAPSULE, EXTENDED RELEASE ORAL at 21:39

## 2021-01-19 RX ADMIN — HEPARIN SODIUM 5000 UNITS: 5000 INJECTION INTRAVENOUS; SUBCUTANEOUS at 13:17

## 2021-01-19 RX ADMIN — HYDROCORTISONE 1 APPLICATION: 25 CREAM TOPICAL at 09:08

## 2021-01-19 RX ADMIN — INSULIN LISPRO 1 UNITS: 100 INJECTION, SOLUTION INTRAVENOUS; SUBCUTANEOUS at 17:37

## 2021-01-19 RX ADMIN — Medication 2000 UNITS: at 09:06

## 2021-01-19 RX ADMIN — ASPIRIN 81 MG: 81 TABLET, COATED ORAL at 09:06

## 2021-01-19 RX ADMIN — INSULIN LISPRO 3 UNITS: 100 INJECTION, SOLUTION INTRAVENOUS; SUBCUTANEOUS at 09:08

## 2021-01-19 RX ADMIN — Medication 1 TABLET: at 09:06

## 2021-01-19 RX ADMIN — PRAVASTATIN SODIUM 40 MG: 40 TABLET ORAL at 17:36

## 2021-01-19 RX ADMIN — INSULIN GLARGINE 18 UNITS: 100 INJECTION, SOLUTION SUBCUTANEOUS at 12:14

## 2021-01-19 RX ADMIN — HYDROCORTISONE 1 APPLICATION: 25 CREAM TOPICAL at 17:37

## 2021-01-19 RX ADMIN — MEMANTINE 10 MG: 10 TABLET ORAL at 17:36

## 2021-01-19 RX ADMIN — LEVOTHYROXINE SODIUM 137 MCG: 25 TABLET ORAL at 05:28

## 2021-01-19 RX ADMIN — INSULIN LISPRO 1 UNITS: 100 INJECTION, SOLUTION INTRAVENOUS; SUBCUTANEOUS at 21:41

## 2021-01-19 RX ADMIN — INSULIN LISPRO 2 UNITS: 100 INJECTION, SOLUTION INTRAVENOUS; SUBCUTANEOUS at 12:15

## 2021-01-19 RX ADMIN — INSULIN LISPRO 3 UNITS: 100 INJECTION, SOLUTION INTRAVENOUS; SUBCUTANEOUS at 17:36

## 2021-01-19 RX ADMIN — MELATONIN 6 MG: at 21:36

## 2021-01-19 RX ADMIN — INSULIN LISPRO 3 UNITS: 100 INJECTION, SOLUTION INTRAVENOUS; SUBCUTANEOUS at 12:15

## 2021-01-19 RX ADMIN — LOSARTAN POTASSIUM: 50 TABLET, FILM COATED ORAL at 09:07

## 2021-01-19 RX ADMIN — TAMOXIFEN CITRATE 20 MG: 10 TABLET ORAL at 17:37

## 2021-01-19 RX ADMIN — HEPARIN SODIUM 5000 UNITS: 5000 INJECTION INTRAVENOUS; SUBCUTANEOUS at 05:18

## 2021-01-19 RX ADMIN — TAMOXIFEN CITRATE 20 MG: 10 TABLET ORAL at 09:08

## 2021-01-19 RX ADMIN — DILTIAZEM HYDROCHLORIDE 120 MG: 60 CAPSULE, EXTENDED RELEASE ORAL at 09:07

## 2021-01-19 RX ADMIN — DONEPEZIL HYDROCHLORIDE 10 MG: 5 TABLET ORAL at 21:36

## 2021-01-19 RX ADMIN — MEMANTINE 10 MG: 10 TABLET ORAL at 09:06

## 2021-01-19 NOTE — PLAN OF CARE
Problem: OCCUPATIONAL THERAPY ADULT  Goal: Performs self-care activities at highest level of function for planned discharge setting  See evaluation for individualized goals  Description: Treatment Interventions: ADL retraining, Functional transfer training, UE strengthening/ROM, Endurance training, Patient/family training, Cognitive reorientation, Energy conservation, Activityengagement, Compensatory technique education          See flowsheet documentation for full assessment, interventions and recommendations  Note: Limitation: Decreased ADL status, Decreased endurance, Decreased cognition, Decreased self-care trans, Decreased high-level ADLs, Decreased UE strength  Prognosis: Good  Assessment: Patient is a 69 y o  female seen for OT evaluation s/p admit to [address] [place] [address] on 1/9/2021 w/COVID-19  Commorbidities affecting patient's functional performance at time of assessment include: acute respiratory failure with hypoxia, acute encephalopathy, dementia, HTN, type 2 DM, history of breast cancer, and immobility  Patient  has a past medical history of Arthritis, Breast cancer ([facility] ), Cancer ([facility] ), Coronary artery disease, [facility] ), GERD (gastroesophageal reflux disease), History of transfusion, Hyperlipidemia, Hypertension, Hypothyroidism, Memory difficulties, Psychiatric disorder, and Shortness of breath  Orders placed for OT evaluation and treatment  Performed at least two patient identifiers during session including name and wristband  Prior to admission, patient was living with her daughter and family in a ranch style home with 2 SHAILA  At baseline, patient requires assistance with both ADLs and IADLs  Personal factors affecting patient at time of initial evaluation include: limited insight into deficits, difficulty performing ADLs and difficulty performing IADLs   Upon evaluation, patient requires supervision and set up assist for UB ADLs, minimal  assist for LB ADLs, transfers and functional ambulation in room and bathroom with supervision assist, with the use of Rolling Walker  Patient is alert, oriented to name,, oriented to place,, disoriented to time, and disoriented to situation,  Occupational performance is affected by the following deficits: orientation, decreased muscle strength, dynamic sit/ stand balance deficit with poor standing tolerance time for self care and functional mobility, decreased activity tolerance, impaired memory, impaired problem solving and postural control and postural alignment deficit, requiring external assistance to complete transitional movements, decreased functional mobility, and decreased endurance  Therapist completed  extensive additional review of medical records and additional review of physical, cognitive or psychosocial history, clinical examination identifying 5 or more performance deficits, clinical decision making of a high complexity , consistent with a high complexity level evaluation  Patient to benefit from continued Occupational Therapy treatment while in the hospital to address deficits as defined above and maximize level of functional independence with ADLs and functional mobility  Occupational Performance areas to address include: grooming , bathing/ shower, dressing, toilet hygiene, transfer to all surfaces, functional mobility, community mobility, emergency response, health maintenance, IADLs: safety procedures, Leisure Participation and Social participation  From OT standpoint, recommendation at time of d/c would be 24 hour supervision/ assist, Home with family support and Home OT; if family unable to provide assistance and supervision, recommendation would be STR         OT Discharge Recommendation: Other (Comment)(Skilled home OT services and home w/ 24/7 supervision)

## 2021-01-19 NOTE — CASE MANAGEMENT
Per SLIM patient is cleared for discharge  CM phoned and spoke to Todd to inform of IMM and IMM is in the chart  CM informed dtr- Carla Lloyd patient is discharged per MILAN Lloyd states she was surprise to hear patient is cleared for discharge because she recently spoke to the nurse who told her patient will not be discharged today  CM notified nancy Lloyd this CM will communicate with SLIM about discharge again and afterwards call her back  CM TT SLIM to confirm discharge plan to be home with [facility] CM requested script for walker and oxygen  CM requested orders for home oxygen evaluation  SLIM put orders for oxygen evaluation and this CM TT fredy-manager for respiratory who states he will conduct the home oxygen evaluation  CM notified SLIM via TT respiratory states  patient is on 6 liters  SLIM TT to inform he not discharge  Patient at this time due to patient being on 6 liters of oxygen

## 2021-01-19 NOTE — RESPIRATORY THERAPY NOTE
Home Oxygen Qualifying Test       Patient name: Swati Daily        : 1950   Date of Test:  2021  Diagnosis:      Home Oxygen Test:    **Medicare Guidelines require item(s) 1-5 on all ambulatory patients or 1 and 2 on non-ambulatory patients  1   Baseline SPO2 on Room Air at rest 83 %  2   SPO2 during exercise on Room Air na %  During exercise monitor SpO2  If SPO2 increases >=89% with ambulation do not add supplemental             oxygen  If <= 88% on room air add O2 via NC and titrate patient  Patient must be ambulated with O2 and titrated to > 88% with exertion  3   SPO2 on Oxygen at rest 90 % 4 lpm     4   SPO2 during exercise on Oxygen  91% a liter flow of 6 lpm     5   Exercise performed:          walking, distance 40 (feet)          [x]  Supplemental Home Oxygen is indicated  []  Client does not qualify for home oxygen  Respiratory Additional Notes- pt desat to 83% on RA at rest  Incr O2 to 4 l NC sats came up to 90% at rest  Pt walked from bed to sink in room  Sats dropped to 83%  Incr to 6 l NC to achieve a minimum of 91% during exertion  NOTE that patient was only able to do minimal exertion before she got SOB during walking  I do not recommend discharge at this time until oxygenation is more stable    Lesa Shirley, RT

## 2021-01-19 NOTE — ASSESSMENT & PLAN NOTE
Lab Results   Component Value Date    HGBA1C 5 9 (H) 08/21/2020       Recent Labs     01/18/21  2118 01/19/21  0518 01/19/21  1056 01/19/21  1634   POCGLU 207* 204* 282* 204*       Blood Sugar Average: Last 72 hrs:  (P) 207 6     HbA1c within appropriate range  POC glucose slightly elevated since previous assessment     - Continue basal bolus insulin; current 18 units of glargine hs plus 3 units Humalog t i d  A c  plus sliding scale  Will continue to monitor and adjust as needed  - Continue to monitor POC glucose

## 2021-01-19 NOTE — PLAN OF CARE
Problem: PHYSICAL THERAPY ADULT  Goal: Performs mobility at highest level of function for planned discharge setting  See evaluation for individualized goals  Description: Treatment/Interventions: Functional transfer training, LE strengthening/ROM, Endurance training, Therapeutic exercise, Patient/family training, Equipment eval/education, Bed mobility, Gait training, Spoke to nursing, Spoke to MD  Equipment Recommended: (continue with RW)       See flowsheet documentation for full assessment, interventions and recommendations  Note: Prognosis: Fair  Problem List: Decreased strength, Decreased endurance, Impaired balance, Decreased mobility, Decreased cognition, Decreased safety awareness  Assessment: Pt seen for PT treatment session this date with interventions consisting of gait training w/ emphasis on improving pt's ability to ambulate level surfaces x 10' + 25' with SBA provided by therapist with RW and Therapeutic exercise consisting of: AROM 10 reps B LE in sitting position  Pt agreeable to PT treatment session upon arrival, pt found seated OOB in recliner, in no apparent distress and responsive  In comparison to previous session, pt with improvements in household distance gait tolerance without LOB Observed; decreased assistance for all phases of mobility  Post session: pt returned back to recliner, chair alarm engaged, all needs in reach and RN notified of session findings/recommendations  Recommend Home PT with family support at time of d/c in order to maximize pt's functional independence and safety w/ mobility  Pt continues to be functioning below baseline level, and remains limited 2* factors listed above  PT will continue to see pt while here in order to address the deficits listed above and provide interventions consistent w/ POC in effort to achieve STGs  Recommend stair trial next session    Barriers to Discharge: Inaccessible home environment, Decreased caregiver support     PT Discharge Recommendation: Home with skilled therapy, Return to previous environment with social support(home c 24/7 A/S)     PT - OK to Discharge: No    See flowsheet documentation for full assessment

## 2021-01-19 NOTE — OCCUPATIONAL THERAPY NOTE
Occupational Therapy Evaluation Note        Patient Name: Dexter Siemens  BHTLF'B Date: 1/19/2021 01/19/21 1030   OT Last Visit   OT Visit Date 01/19/21   Note Type   Note type Evaluation   Restrictions/Precautions   Weight Bearing Precautions Per Order No   Other Precautions Contact/isolation; Airborne/isolation;Cognitive; Chair Alarm; Bed Alarm;O2;Fall Risk;Hard of hearing;Limb alert  (COVID-19 confirmed; 2L O2 NC)   Pain Assessment   Pain Assessment Tool Pain Assessment not indicated - pt denies pain   Pain Score No Pain   Home Living   Type of [address] [facility] One level;Stairs to enter with rails  (pt unsure of how many floors; 2 SHAILA)   [address] [facility]  (pt reports using walker at baseline)   Prior Function   Level of [place] Needs assistance with IADLs; Needs assistance with ADLs and functional mobility   Lives With Daughter;Family   Receives Help From Family   ADL Assistance Needs assistance   IADLs Needs assistance   Falls in the last 6 months 0  (pt denies any falls, unsure of history)   Comments Patient is a poor historian at baseline secondary to diagnosed cognitive deficits; CM to follow up to ensure accuracy  Per CM note, patient lives with her family in a one-story home with 2 SHAILA  Lifestyle   Autonomy Per chart review, patient lives with her daughter and family in a ranch style home with 2 SHAILA  At baseline, patient requires assistance with both ADLs and IADLs and uses a walker for functional mobility     Reciprocal Relationships Supportive family   Psychosocial   Psychosocial (WDL) WDL   ADL   Eating Assistance 5  Supervision/Setup   Grooming Assistance 5  Supervision/Setup   UB Bathing Assistance 5  Supervision/Setup   LB Bathing Assistance 4  Minimal Assistance   UB Dressing Assistance 5  Supervision/Setup   LB Dressing Assistance 4  Minimal Assistance   Toileting Assistance  5  Supervision/Setup   Functional Assistance 4  Minimal Assistance   Additional Comments ADL assist levels based on pt's functional performance during OT evaluation   Bed Mobility   Additional Comments Patient received seated OOB to recliner chair upon OT arrival; at end of session: pt returned seated to recliner chair w/ chair alarm activated and working with PT   Transfers   Sit to Stand 5  Supervision   Additional items Assist x 1; Increased time required;Verbal cues;Armrests   Stand to Sit 5  Supervision   Additional items Assist x 1; Increased time required;Verbal cues;Armrests   Toilet transfer 4  Minimal assistance  (CGA)   Additional items Assist x 1; Armrests; Increased time required;Verbal cues; Commode   Additional Comments Performed functional transfers with RW  Patient demonstrated good safety awareness during transfers  SpO2 decreased to 83% of 2L O2 NC, increased to 91% with <1 minute seated rest break   Functional Mobility   Functional Mobility 5  Supervision   Additional Comments x1   Additional items Rolling walker   Balance   Static Sitting Good   Dynamic Sitting Fair +   Static Standing Fair   Dynamic Standing Fair -   Ambulatory Fair -   Activity Tolerance   Activity Tolerance Patient limited by fatigue; Other (Comment)  (SOB/DUGAN)   Medical Staff Made Aware  [address]   Nurse Made Aware JIMENEZ Rowland confirmed pt appropriate for therapy and made aware of session outcomes   RUE Assessment   RUE Assessment WFL  (AROM and strength grossly WFL based on formal assessment)   LUE Assessment   LUE Assessment WFL  (AROM and strength grossly WFL based on formal assessment)   Hand Function   Gross Motor Coordination Functional   Fine Motor Coordination Functional   Sensation   Light Touch No apparent deficits  (BUEs)   Vision-Basic Assessment   Current Vision Wears glasses all the time   Cognition   Overall Cognitive Status Impaired   Arousal/Participation Alert; Responsive; Cooperative   Attention Attends with cues to redirect   Orientation Level Oriented to person;Oriented to place; Disoriented to time;Disoriented to situation   Memory Decreased short term memory;Decreased recall of recent events;Decreased recall of precautions;Decreased recall of biographical information   Following Commands Follows one step commands with increased time or repetition   Comments Patient agreeable to OT evaluation   Assessment   Limitation Decreased ADL status; Decreased endurance;Decreased cognition;Decreased self-care trans;Decreased high-level ADLs; Decreased UE strength   Prognosis Good   Assessment Patient is a 69 y o  female seen for OT evaluation s/p admit to [address] [facility] on 1/9/2021 w/COVID-19  Commorbidities affecting patient's functional performance at time of assessment include: acute respiratory failure with hypoxia, acute encephalopathy, dementia, HTN, type 2 DM, history of breast cancer, and immobility  Patient  has a past medical history of Arthritis, Breast cancer ([facility] ), Cancer ([facility] ), Coronary artery disease, [facility] GERD (gastroesophageal reflux disease), History of transfusion, Hyperlipidemia, Hypertension, Hypothyroidism, Memory difficulties, Psychiatric disorder, and Shortness of breath  Orders placed for OT evaluation and treatment  Performed at least two patient identifiers during session including name and wristband  Prior to admission, patient was living with her daughter and family in a ranch style home with 2 SHAILA  At baseline, patient requires assistance with both ADLs and IADLs  Personal factors affecting patient at time of initial evaluation include: limited insight into deficits, difficulty performing ADLs and difficulty performing IADLs  Upon evaluation, patient requires supervision and set up assist for UB ADLs, minimal  assist for LB ADLs, transfers and functional ambulation in room and bathroom with supervision assist, with the use of [address]  Patient is alert, oriented to name,, oriented to place,, disoriented to time, and disoriented to situation,  Occupational performance is affected by the following deficits: orientation, decreased muscle strength, dynamic sit/ stand balance deficit with poor standing tolerance time for self care and functional mobility, decreased activity tolerance, impaired memory, impaired problem solving and postural control and postural alignment deficit, requiring external assistance to complete transitional movements, decreased functional mobility, and decreased endurance  Therapist completed  extensive additional review of medical records and additional review of physical, cognitive or psychosocial history, clinical examination identifying 5 or more performance deficits, clinical decision making of a high complexity , consistent with a high complexity level evaluation  Patient to benefit from continued Occupational Therapy treatment while in the hospital to address deficits as defined above and maximize level of functional independence with ADLs and functional mobility  Occupational Performance areas to address include: grooming , bathing/ shower, dressing, toilet hygiene, transfer to all surfaces, functional mobility, community mobility, emergency response, health maintenance, IADLs: safety procedures, Leisure Participation and Social participation  From OT standpoint, recommendation at time of d/c would be 24 hour supervision/ assist, Home with family support and Home OT; if family unable to provide assistance and supervision, recommendation would be STR  Goals   Patient Goals to return home   Plan   Treatment Interventions ADL retraining;Functional transfer training;UE strengthening/ROM; Endurance training;Patient/family training;Cognitive reorientation; Energy conservation; Activityengagement; Compensatory technique education   Goal Expiration Date 02/02/21   OT Treatment Day 0   OT Frequency 3-5x/wk   Recommendation   OT Discharge Recommendation Other (Comment)  (Skilled home OT services and home w/ 24/7 supervision)   Barthel Index   Feeding 10   Bathing 0   Grooming Score 0   Dressing Score 5   Bladder Score 10   Bowels Score 10   Toilet Use Score 5   Transfers (Bed/Chair) Score 10   Mobility (Level Surface) Score 0   Stairs Score 0   Barthel Index Score 50   Modified Humphreys Scale   Modified Nida Scale 4     Occupational Therapy Goals to be completed in 7-14 Days:    1 - Patient will verbalize and demonstrate use of energy conservation/ deep breathing technique and work simplification skills during functional activity with no verbal cues  2 - Patient will verbalize and demonstrate good body mechanics and joint protection techniques during  ADLs/ IADLs with no verbal cues  3 - Patient will increase OOB/ sitting tolerance to 4-6 hours per day for increased participation in self care and leisure tasks with no s/s of exertion  4 - Patient will increase standing tolerance time to 5 minutes with unilateral UE support to complete sink level ADLs@ mod I level  5 - Patient will increase sitting tolerance at edge of bed to 20 minutes to complete UB ADLs @ set up assist level  6 - Patient will transfer bed to Chair / toilet at Mod I assist level with least restrictive AD  7 - Patient will complete UB ADLs with set up assist      8 - Patient will complete LB ADLs with supervision assist while seated  9 - Patient will complete toileting hygiene with Mod I assist/ supervision for thoroughness    10 - Patient/ Family will demonstrate competency with UE Home Exercise Program      11-  Patient will identify s/s of exertion during ADL and functional mobility with minimal verbal cues  12- Patient will verbalize/ demonstrate compensatory strategies to recover from exertion with minimal verbal cues       Christelle Damon OTR/L

## 2021-01-19 NOTE — PROGRESS NOTES
Progress Note Dimas Gayle [birth year], 69 y o  female MRN: [id number]    Unit/Bed#: -01 Encounter: [id number]    Primary Care Provider: Emile Beyer MD   Date and time admitted to hospital: 1/9/2021  3:20 PM        * COVID-19  Assessment & Plan  Patient in no respiratory distress and saturating at 90-95% on 3L/min  Oxygen saturations dropped to the 70s on 3L/min of oxygen while ambulating with PT yesterday  Underwent home oxygen eval today which indicated that she would require up to 6L/min  - Completed mild COVID 19 treatment protocol   - Will continue to provide supplemental oxygen and titrate  - Monitor respiratory status  Acute respiratory failure with hypoxia [facility])  Assessment & Plan  See assessment and management for COVID-19 infection  Acute encephalopathy  Assessment & Plan  Patient presented with worsening disorientation  Likely secondary to toxic encephalopathy superimposed on baseline dementia  Periodically confused but currently patient is AAOx3  - Acute encephalopathy resolved  Baseline dementia remains  Dementia [facility])  Assessment & Plan  Reported to be periodically disoriented  AAOx3 on today's encounter     - Continue donepezil and memantine routine   - Continue olanzapine prn for agitation, psychosis  - Redirect as needed  Hypertension  Assessment & Plan  BP stable  - Continue antihypertensives  - Monitor vitals  Q shift  Type 2 diabetes mellitus [facility])  Assessment & Plan  Lab Results   Component Value Date    HGBA1C 5 9 (H) 08/21/2020       Recent Labs     01/18/21  2118 01/19/21  0518 01/19/21  1056 01/19/21  1634   POCGLU 207* 204* 282* 204*       Blood Sugar Average: Last 72 hrs:  (P) 207 6     HbA1c within appropriate range  POC glucose slightly elevated since previous assessment     - Continue basal bolus insulin; current 18 units of glargine hs plus 3 units Humalog t i d  A c  plus sliding scale   Will continue to monitor and adjust as needed  - Continue to monitor POC glucose  History of breast cancer  Assessment & Plan  - Continue tamoxifen  Immobility  Assessment & Plan  Patient ambulates with walker at baseline  Currently on day 9 of admission, with minimal ambulation  Patient's age, comorbidities duration of convalescence placed her at increased risk for deconditioning     - Follow-up PT/OT evaluation  VTE Pharmacologic Prophylaxis:   Pharmacologic: Heparin  Mechanical VTE Prophylaxis in Place: Yes    Patient Centered Rounds: I have performed bedside rounds with nursing staff today  Discussions with Specialists or Other Care Team Provider: RT evaluation appreciated  Education and Discussions with Family / Patient: Patient's daughter updated by SLIM with regards to patients current status  Time Spent for Care: 45 minutes  More than 50% of total time spent on counseling and coordination of care as described above  Current Length of Stay: 10 day(s)    Current Patient Status: Inpatient   Certification Statement: The patient will continue to require additional inpatient hospital stay due to acute respiratory failure with hyoxia  Discharge Plan / Estimated Discharge Date: Attempt to titrate oxygen to 3L/min or less / On or before 2021  Code Status: Level 1 - Full Code      Subjective:   No acute issues reported  Objective:     Vitals:   Temp (24hrs), Av °F (36 7 °C), Min:98 °F (36 7 °C), Max:98 1 °F (36 7 °C)    Temp:  [98 °F (36 7 °C)-98 1 °F (36 7 °C)] 98 °F (36 7 °C)  HR:  [50-64] 60  Resp:  [18-19] 19  BP: (115-141)/(54-70) 115/70  SpO2:  [83 %-95 %] 83 %  Body mass index is 32 84 kg/m²  Input and Output Summary (last 24 hours): Intake/Output Summary (Last 24 hours) at 2021 1719  Last data filed at 2021 0205  Gross per 24 hour   Intake 480 ml   Output 700 ml   Net -220 ml       Physical Exam:     Physical Exam  Vitals signs reviewed     Constitutional:       Appearance: Normal appearance  She is obese  HENT:      Mouth/Throat:      Mouth: Mucous membranes are moist    Eyes:      General:         Right eye: No discharge  Left eye: No discharge  Conjunctiva/sclera: Conjunctivae normal    Neck:      Musculoskeletal: Neck supple  Cardiovascular:      Rate and Rhythm: Normal rate and regular rhythm  Heart sounds: S1 normal and S2 normal    Pulmonary:      Effort: Pulmonary effort is normal       Breath sounds: Normal breath sounds  Abdominal:      Palpations: Abdomen is soft  Musculoskeletal:         General: No swelling or tenderness  Skin:     General: Skin is warm and dry  Neurological:      General: No focal deficit present  Mental Status: She is alert  Psychiatric:         Mood and Affect: Mood normal          Behavior: Behavior normal          Additional Data:     Labs:    Results from last 7 days   Lab Units 01/19/21  0448   WBC Thousand/uL 8 14   HEMOGLOBIN g/dL 12 2   HEMATOCRIT % 36 5   PLATELETS Thousands/uL 128*   NEUTROS PCT % 89*   LYMPHS PCT % 9*   MONOS PCT % 1*   EOS PCT % 0     Results from last 7 days   Lab Units 01/19/21  0448  01/17/21  0328   POTASSIUM mmol/L 4 7   < > 5 5*   CHLORIDE mmol/L 103   < > 104   CO2 mmol/L 25   < > 23   BUN mg/dL 44*   < > 35*   CREATININE mg/dL 1 04   < > 1 08   CALCIUM mg/dL 8 6   < > 8 6   ALK PHOS U/L  --   --  46   ALT U/L  --   --  64   AST U/L  --   --  30    < > = values in this interval not displayed  Results from last 7 days   Lab Units 01/14/21  0522   INR  1 22*       * I Have Reviewed All Lab Data Listed Above  * Additional Pertinent Lab Tests Reviewed: All Labs Within Last 24 Hours Reviewed    Imaging:    Imaging Reports Reviewed Today Include: None  Imaging Personally Reviewed by Myself Includes:  None        Recent Cultures (last 7 days):           Last 24 Hours Medication List:   Current Facility-Administered Medications   Medication Dose Route Frequency Provider Last Rate    acetaminophen  650 mg Oral Q6H PRN Kiah Helm MD      aspirin  81 mg Oral Daily Kiah Helm MD      cholecalciferol  2,000 Units Oral Daily Kaih Helm MD      diltiazem  120 mg Oral Q12H Marc Deleon MD      donepezil  10 mg Oral HS iKah Helm MD      heparin (porcine)  5,000 Units Subcutaneous Q8H [address] Kiah Helm MD      hydrocortisone   Topical BID Zbigniew LEOS MD      insulin glargine  18 Units Subcutaneous Daily With Lunch Kavin Mehta MD      insulin lispro  1-5 Units Subcutaneous TID AC MD Kyung Castano Slipper insulin lispro  1-5 Units Subcutaneous HS MD Kyung Castano insulin lispro  3 Units Subcutaneous TID With Meals MD Kyung Sharma levothyroxine  137 mcg Oral Early Morning Kiah Helm MD      losartan potassium-hydrochlorothiazide (HYZAAR 50/12  5) combination   Oral Daily Zbigniew LEOS MD      melatonin  6 mg Oral HS Zbigniew LEOS MD      memantine  10 mg Oral BID Kiah Helm MD      multivitamin-minerals  1 tablet Oral Daily Kiah Helm MD      OLANZapine  2 5 mg Intramuscular Q3H PRN Leonarda Craig MD      ondansetron  4 mg Intravenous Q6H PRN Kiah Helm MD      pravastatin  40 mg Oral Daily With Lesly Kenny MD      tamoxifen  20 mg Oral BID Kiah Helm MD          Today, Patient Was Seen By: Lucie Weinberg MD    ** Please Note: Dragon 360 Dictation voice to text software may have been used in the creation of this document   **

## 2021-01-19 NOTE — ASSESSMENT & PLAN NOTE
Patient in no respiratory distress and saturating at 90-95% on 3L/min  Oxygen saturations dropped to the 70s on 3L/min of oxygen while ambulating with PT yesterday  Underwent home oxygen eval today which indicated that she would require up to 6L/min  - Completed mild COVID 19 treatment protocol   - Will continue to provide supplemental oxygen and titrate  - Monitor respiratory status

## 2021-01-19 NOTE — PHYSICAL THERAPY NOTE
Physical Therapy Treatment Note       01/19/21 1035   PT Last Visit   PT Visit Date 01/19/21   Note Type   Note Type Treatment   Pain Assessment   Pain Assessment Tool Pain Assessment not indicated - pt denies pain   Pain Score No Pain   Restrictions/Precautions   Weight Bearing Precautions Per Order No   Other Precautions Contact/isolation; Airborne/isolation;Cognitive; Chair Alarm; Bed Alarm; Fall Risk;Hard of hearing;Limb alert  (+COVID-19 confirmed  2L O2 NC)   General   Chart Reviewed Yes   Response to Previous Treatment Patient with no complaints from previous session  Family/Caregiver Present No   Cognition   Overall Cognitive Status Impaired   Arousal/Participation Cooperative   Attention Attends with cues to redirect   Orientation Level Oriented to person;Oriented to place; Disoriented to time;Disoriented to situation   Memory Decreased recall of biographical information;Decreased short term memory;Decreased recall of recent events;Decreased recall of precautions   Following Commands Follows one step commands with increased time or repetition   Comments pt agreeable to PT session   Subjective   Subjective "I guess I can pee"   Bed Mobility   Additional Comments pt received OOB to recliner upon arrival   Transfers   Sit to Stand 5  Supervision   Additional items Assist x 1; Increased time required;Verbal cues;Armrests   Stand to Sit 5  Supervision   Additional items Assist x 1; Increased time required;Verbal cues;Armrests   Additional Comments SpO2 down to 83% on 2L O2 NC, increased to 91% in < 15 sec seated rest break; vc for PLB/breathing technique   Ambulation/Elevation   Gait pattern Decreased foot clearance; Short stride; Step to   Gait Assistance 5  Supervision   Additional items Assist x 1;Verbal cues   Assistive Device Rolling walker   Distance 10' + 25'   Stair Management Assistance Not tested   Balance   Static Sitting Good   Dynamic Sitting Fair +   Static Standing John 83 - Ambulatory Fair -   Endurance Deficit   Endurance Deficit Yes   Activity Tolerance   Activity Tolerance Patient limited by fatigue; Other (Comment)  (SOB/DUGAN  cognition)   Medical Staff Made Aware OT Velvet Ruiz   Nurse Made Aware RN Ernestine Hines verbalized pt appropriate to see, made aware of session outcome/recs   Exercises   Heelslides Sitting;10 reps;AROM; Bilateral   Hip Abduction Sitting;10 reps;AROM; Bilateral   Knee AROM Long Arc Quad Sitting;10 reps;AROM; Bilateral   Ankle Pumps Sitting;10 reps;AROM; Bilateral   Marching Sitting;10 reps;AROM; Bilateral   Assessment   Prognosis Fair   Problem List Decreased strength;Decreased endurance; Impaired balance;Decreased mobility; Decreased cognition;Decreased safety awareness   Assessment Pt seen for PT treatment session this date with interventions consisting of gait training w/ emphasis on improving pt's ability to ambulate level surfaces x 10' + 25' with SBA provided by therapist with RW and Therapeutic exercise consisting of: AROM 10 reps B LE in sitting position  Pt agreeable to PT treatment session upon arrival, pt found seated OOB in recliner, in no apparent distress and responsive  In comparison to previous session, pt with improvements in household distance gait tolerance without LOB Observed; decreased assistance for all phases of mobility  Post session: pt returned back to recliner, chair alarm engaged, all needs in reach and RN notified of session findings/recommendations  Recommend Home PT with family support at time of d/c in order to maximize pt's functional independence and safety w/ mobility  Pt continues to be functioning below baseline level, and remains limited 2* factors listed above  PT will continue to see pt while here in order to address the deficits listed above and provide interventions consistent w/ POC in effort to achieve STGs  Recommend stair trial next session     Barriers to Discharge Inaccessible home environment;Decreased caregiver support   Goals Patient Goals to go home   STG Expiration Date 01/28/21   Short Term Goal #1 STGs remain appropriate   PT Treatment Day 1   Plan   Treatment/Interventions Functional transfer training;LE strengthening/ROM; Endurance training; Therapeutic exercise;Patient/family training;Equipment eval/education; Bed mobility;Gait training;Spoke to nursing   Progress Progressing toward goals   PT Frequency   (3-5x/wk)   Recommendation   PT Discharge Recommendation Home with skilled therapy; Return to previous environment with social support  (home c 24/7 A/S)   Equipment Recommended Walker  (RW)   PT - OK to Discharge No   Additional Comments if family able to provide 24/7 A/S, would recommend return home with HHPT services and family support; if unable, will need to consider STR       Sunday JONATHAN Peralta    Time of PT treatment session: [id number]

## 2021-01-19 NOTE — PLAN OF CARE
Problem: Potential for Falls  Goal: Patient will remain free of falls  Description: INTERVENTIONS:  - Assess patient frequently for physical needs  -  Identify cognitive and physical deficits and behaviors that affect risk of falls    -  [place] fall precautions as indicated by assessment   - Educate patient/family on patient safety including physical limitations  - Instruct patient to call for assistance with activity based on assessment  - Modify environment to reduce risk of injury  - Consider OT/PT consult to assist with strengthening/mobility  Outcome: Progressing     Problem: PAIN - ADULT  Goal: Verbalizes/displays adequate comfort level or baseline comfort level  Description: Interventions:  - Encourage patient to monitor pain and request assistance  - Assess pain using appropriate pain scale  - Administer analgesics based on type and severity of pain and evaluate response  - Implement non-pharmacological measures as appropriate and evaluate response  - Consider cultural and social influences on pain and pain management  - Notify physician/advanced practitioner if interventions unsuccessful or patient reports new pain  Outcome: Progressing     Problem: INFECTION - ADULT  Goal: Absence or prevention of progression during hospitalization  Description: INTERVENTIONS:  - Assess and monitor for signs and symptoms of infection  - Monitor lab/diagnostic results  - Monitor all insertion sites, i e  indwelling lines, tubes, and drains  - Monitor endotracheal if appropriate and nasal secretions for changes in amount and color  - [place] appropriate cooling/warming therapies per order  - Administer medications as ordered  - Instruct and encourage patient and family to use good hand hygiene technique  - Identify and instruct in appropriate isolation precautions for identified infection/condition  Outcome: Progressing  Goal: Absence of fever/infection during neutropenic period  Description: INTERVENTIONS:  - Monitor WBC    Outcome: Progressing     Problem: RESPIRATORY - ADULT  Goal: Achieves optimal ventilation and oxygenation  Description: INTERVENTIONS:  - Assess for changes in respiratory status  - Assess for changes in mentation and behavior  - Position to facilitate oxygenation and minimize respiratory effort  - Oxygen administered by appropriate delivery if ordered  - Initiate smoking cessation education as indicated  - Encourage broncho-pulmonary hygiene including cough, deep breathe, Incentive Spirometry  - Assess the need for suctioning and aspirate as needed  - Assess and instruct to report SOB or any respiratory difficulty  - Respiratory Therapy support as indicated  Outcome: Progressing     Problem: METABOLIC, FLUID AND ELECTROLYTES - ADULT  Goal: Electrolytes maintained within normal limits  Description: INTERVENTIONS:  - Monitor labs and assess patient for signs and symptoms of electrolyte imbalances  - Administer electrolyte replacement as ordered  - Monitor response to electrolyte replacements, including repeat lab results as appropriate  - Instruct patient on fluid and nutrition as appropriate  Outcome: Progressing  Goal: Fluid balance maintained  Description: INTERVENTIONS:  - Monitor labs   - Monitor I/O and WT  - Instruct patient on fluid and nutrition as appropriate  - Assess for signs & symptoms of volume excess or deficit  Outcome: Progressing     Problem: HEMATOLOGIC - ADULT  Goal: Maintains hematologic stability  Description: INTERVENTIONS  - Assess for signs and symptoms of bleeding or hemorrhage  - Monitor labs  - Administer supportive blood products/factors as ordered and appropriate  Outcome: Progressing     Problem: Nutrition/Hydration-ADULT  Goal: Nutrient/Hydration intake appropriate for improving, restoring or maintaining nutritional needs  Description: Monitor and assess patient's nutrition/hydration status for malnutrition   Collaborate with interdisciplinary team and initiate plan and interventions as ordered  Monitor patient's weight and dietary intake as ordered or per policy  Utilize nutrition screening tool and intervene as necessary  Determine patient's food preferences and provide high-protein, high-caloric foods as appropriate       INTERVENTIONS:  - Monitor oral intake, urinary output, labs, and treatment plans  - Assess nutrition and hydration status and recommend course of action  - Evaluate amount of meals eaten  - Assist patient with eating if necessary   - Allow adequate time for meals  - Recommend/ encourage appropriate diets, oral nutritional supplements, and vitamin/mineral supplements  - Order, calculate, and assess calorie counts as needed  - Assess need for intravenous fluids  - Provide nutrition/hydration education as appropriate  - Include patient/family/caregiver in decisions related to nutrition  Outcome: Progressing     Problem: Prexisting or High Potential for Compromised Skin Integrity  Goal: Skin integrity is maintained or improved  Description: INTERVENTIONS:  - Identify patients at risk for skin breakdown  - Assess and monitor skin integrity  - Assess and monitor nutrition and hydration status  - Monitor labs   - Assess for incontinence   - Turn and reposition patient  - Assist with mobility/ambulation  - Relieve pressure over bony prominences  - Avoid friction and shearing  - Provide appropriate hygiene as needed including keeping skin clean and dry  - Evaluate need for skin moisturizer/barrier cream  - Collaborate with interdisciplinary team   - Patient/family teaching  - Consider wound care consult   Outcome: Progressing

## 2021-01-19 NOTE — ASSESSMENT & PLAN NOTE
Patient presented with worsening disorientation  Likely secondary to toxic encephalopathy superimposed on baseline dementia  Periodically confused but currently patient is AAOx3  - Acute encephalopathy resolved  Baseline dementia remains

## 2021-01-20 VITALS
TEMPERATURE: 98.8 F | WEIGHT: 186.29 LBS | DIASTOLIC BLOOD PRESSURE: 64 MMHG | HEART RATE: 70 BPM | BODY MASS INDEX: 33.01 KG/M2 | RESPIRATION RATE: 18 BRPM | OXYGEN SATURATION: 90 % | SYSTOLIC BLOOD PRESSURE: 102 MMHG | HEIGHT: 63 IN

## 2021-01-20 LAB
ANION GAP SERPL CALCULATED.3IONS-SCNC: 8 MMOL/L (ref 4–13)
BASOPHILS # BLD AUTO: 0.01 THOUSANDS/ΜL (ref 0–0.1)
BASOPHILS NFR BLD AUTO: 0 % (ref 0–1)
BUN SERPL-MCNC: 46 MG/DL (ref 5–25)
CALCIUM SERPL-MCNC: 8.9 MG/DL (ref 8.3–10.1)
CHLORIDE SERPL-SCNC: 102 MMOL/L (ref 100–108)
CO2 SERPL-SCNC: 25 MMOL/L (ref 21–32)
CREAT SERPL-MCNC: 1.09 MG/DL (ref 0.6–1.3)
EOSINOPHIL # BLD AUTO: 0.08 THOUSAND/ΜL (ref 0–0.61)
EOSINOPHIL NFR BLD AUTO: 1 % (ref 0–6)
ERYTHROCYTE [DISTWIDTH] IN BLOOD BY AUTOMATED COUNT: 13.1 % (ref 11.6–15.1)
GFR SERPL CREATININE-BSD FRML MDRD: 51 ML/MIN/1.73SQ M
GLUCOSE SERPL-MCNC: 104 MG/DL (ref 65–140)
GLUCOSE SERPL-MCNC: 178 MG/DL (ref 65–140)
GLUCOSE SERPL-MCNC: 187 MG/DL (ref 65–140)
HCT VFR BLD AUTO: 38.7 % (ref 34.8–46.1)
HGB BLD-MCNC: 13 G/DL (ref 11.5–15.4)
IMM GRANULOCYTES # BLD AUTO: 0.09 THOUSAND/UL (ref 0–0.2)
IMM GRANULOCYTES NFR BLD AUTO: 1 % (ref 0–2)
LYMPHOCYTES # BLD AUTO: 1.24 THOUSANDS/ΜL (ref 0.6–4.47)
LYMPHOCYTES NFR BLD AUTO: 13 % (ref 14–44)
MCH RBC QN AUTO: 34.7 PG (ref 26.8–34.3)
MCHC RBC AUTO-ENTMCNC: 33.6 G/DL (ref 31.4–37.4)
MCV RBC AUTO: 103 FL (ref 82–98)
MONOCYTES # BLD AUTO: 0.28 THOUSAND/ΜL (ref 0.17–1.22)
MONOCYTES NFR BLD AUTO: 3 % (ref 4–12)
NEUTROPHILS # BLD AUTO: 7.59 THOUSANDS/ΜL (ref 1.85–7.62)
NEUTS SEG NFR BLD AUTO: 82 % (ref 43–75)
NRBC BLD AUTO-RTO: 0 /100 WBCS
PLATELET # BLD AUTO: 137 THOUSANDS/UL (ref 149–390)
PMV BLD AUTO: 9.3 FL (ref 8.9–12.7)
POTASSIUM SERPL-SCNC: 4.3 MMOL/L (ref 3.5–5.3)
RBC # BLD AUTO: 3.75 MILLION/UL (ref 3.81–5.12)
SODIUM SERPL-SCNC: 135 MMOL/L (ref 136–145)
WBC # BLD AUTO: 9.29 THOUSAND/UL (ref 4.31–10.16)

## 2021-01-20 PROCEDURE — 97110 THERAPEUTIC EXERCISES: CPT

## 2021-01-20 PROCEDURE — 80048 BASIC METABOLIC PNL TOTAL CA: CPT | Performed by: INTERNAL MEDICINE

## 2021-01-20 PROCEDURE — 82948 REAGENT STRIP/BLOOD GLUCOSE: CPT

## 2021-01-20 PROCEDURE — 85025 COMPLETE CBC W/AUTO DIFF WBC: CPT | Performed by: INTERNAL MEDICINE

## 2021-01-20 PROCEDURE — 99239 HOSP IP/OBS DSCHRG MGMT >30: CPT | Performed by: INTERNAL MEDICINE

## 2021-01-20 PROCEDURE — 97530 THERAPEUTIC ACTIVITIES: CPT

## 2021-01-20 RX ORDER — MULTIVITAMIN/IRON/FOLIC ACID 18MG-0.4MG
1 TABLET ORAL DAILY
Qty: 30 TABLET | Refills: 0 | Status: SHIPPED | OUTPATIENT
Start: 2021-01-21

## 2021-01-20 RX ADMIN — DILTIAZEM HYDROCHLORIDE 120 MG: 60 CAPSULE, EXTENDED RELEASE ORAL at 09:07

## 2021-01-20 RX ADMIN — MEMANTINE 10 MG: 10 TABLET ORAL at 09:09

## 2021-01-20 RX ADMIN — LEVOTHYROXINE SODIUM 137 MCG: 25 TABLET ORAL at 05:04

## 2021-01-20 RX ADMIN — TAMOXIFEN CITRATE 20 MG: 10 TABLET ORAL at 09:07

## 2021-01-20 RX ADMIN — Medication 2000 UNITS: at 09:09

## 2021-01-20 RX ADMIN — INSULIN LISPRO 3 UNITS: 100 INJECTION, SOLUTION INTRAVENOUS; SUBCUTANEOUS at 12:24

## 2021-01-20 RX ADMIN — LOSARTAN POTASSIUM: 50 TABLET, FILM COATED ORAL at 09:09

## 2021-01-20 RX ADMIN — Medication 1 TABLET: at 09:09

## 2021-01-20 RX ADMIN — HEPARIN SODIUM 5000 UNITS: 5000 INJECTION INTRAVENOUS; SUBCUTANEOUS at 05:04

## 2021-01-20 RX ADMIN — HYDROCORTISONE 1 APPLICATION: 25 CREAM TOPICAL at 09:08

## 2021-01-20 RX ADMIN — INSULIN GLARGINE 18 UNITS: 100 INJECTION, SOLUTION SUBCUTANEOUS at 12:15

## 2021-01-20 RX ADMIN — ASPIRIN 81 MG: 81 TABLET, COATED ORAL at 09:09

## 2021-01-20 RX ADMIN — INSULIN LISPRO 3 UNITS: 100 INJECTION, SOLUTION INTRAVENOUS; SUBCUTANEOUS at 09:08

## 2021-01-20 RX ADMIN — INSULIN LISPRO 1 UNITS: 100 INJECTION, SOLUTION INTRAVENOUS; SUBCUTANEOUS at 09:08

## 2021-01-20 NOTE — ASSESSMENT & PLAN NOTE
Patient ambulates with walker at baseline  Currently on day 9 of admission, with minimal ambulation  Patient's age, comorbidities duration of convalescence placed her at increased risk for deconditioning     - Home VNA

## 2021-01-20 NOTE — DISCHARGE INSTR - AVS FIRST PAGE
You have been provided with supplemental/portable oxygen due to low oxygen levels, both at rest and with movement  Please obtain a chest x-ray in 6 weeks to assess for resolution of COVID 19 pneumonia  Please continue to take all medications as prescribed  Please follow-up with your primary care physician within one week of discharge

## 2021-01-20 NOTE — CASE MANAGEMENT
CM phoned Rissa to inform [facility] will not clear patient for discharge today because patient is now on 6 liters of oxygen after home oxygen evaluation  Rissa became very upset with this CM stating she spoke to this CM three hours ago and this CM never called back  CM informed Sierra Chacko that this CM is calling now to inform of why patient is no longer being discharge when patient was cleared for discharge earlier  Sierra Chacko said   [employer]  [employer] are poor communicators  Sierra Chacko said the [address]  Is a much better communicator  Sierra Chacko said the staff at [facility] is terrible  Sierra Chacko said she is very upset because her mother's hearing aids are lost and she cannot speak to her mother and she cannot see her mother  Sierra Chacko also states she is very upset and is  taking out her frustrations on this CM  This CM informed Rissa YATES will be contacted immediately to call  her about patient's discharge  CM communicated to SLIM who states she will call Rissa

## 2021-01-20 NOTE — ASSESSMENT & PLAN NOTE
Patient presented with worsening disorientation  Likely secondary to toxic encephalopathy superimposed on baseline dementia  - Acute encephalopathy resolved  Periodically confused

## 2021-01-20 NOTE — DISCHARGE SUMMARY
Discharge- Yomaira Casanova [birth year], 69 y o  female MRN: [id number]    Unit/Bed#: -01 Encounter: [id number]    Primary Care Provider: Dimitri Seay MD   Date and time admitted to hospital: 1/9/2021  3:20 PM        * COVID-19  Assessment & Plan  At the time of discharge, patient appears to be in no respiratory distress and saturating at 90-95% on 3L/min, no significant change from yesterday  S/p COVID 19 mild treatment pathyway  Received remdesivir 5/5 days and dexamethasone for 10 days  Oxygen saturations dropped to the 70s on 3L/min of oxygen while ambulating with PT two days prior, with a home oxygen eval conducted yesterday, which indicated that she would require up to 6L/min  Discussion about SNF placement was rejected by family  - Will d/c patient on 6L/min of portable oxygen  -  IMPROVE score of 2  D- Dimer of 0 41 places her in GROUP 3; no anticoagulation at d/c   - Will d/c with home health care  - Recommend follow-up with PCP within one week  - Repeat CXR in 6 weeks to monitor for resolution of COVID 19 pneumonic infiltrates  Acute respiratory failure with hypoxia [facility])  Assessment & Plan  See assessment and management for COVID-19 infection  Acute encephalopathy  Assessment & Plan  Patient presented with worsening disorientation  Likely secondary to toxic encephalopathy superimposed on baseline dementia  - Acute encephalopathy resolved  Periodically confused  Dementia [facility])  Assessment & Plan  Reported to be periodically disoriented  AAOx3 on today's encounter     - Continue donepezil and memantine routine   - Continue olanzapine prn for agitation   - Redirect as needed  Hypertension  Assessment & Plan  BP stable  - Continue antihypertensives  - Monitor vitals  Q shift      Type 2 diabetes mellitus [facility])  Assessment & Plan  Lab Results   Component Value Date    HGBA1C 5 9 (H) 08/21/2020       Recent Labs     01/19/21  1634 01/19/21  2112 01/20/21  0821 01/20/21  1127   POCGLU 204* 211* 178* 104       Blood Sugar Average: Last 72 hrs:  (P) 187 [id number]     HbA1c within appropriate range  POC glucose slightly elevated since previous assessment  - Will d/c basal-bolus insulin at d/c   - Patient was not on oral glycemic agents prior   - F/u with PCP on d/c  History of breast cancer  Assessment & Plan  - Continue tamoxifen  Immobility  Assessment & Plan  Patient ambulates with walker at baseline  Currently on day 9 of admission, with minimal ambulation  Patient's age, comorbidities duration of convalescence placed her at increased risk for deconditioning     - Home VNA  PCP: Jenniffer Zelaya MD  Admission Date: 1/9/2021  Discharge Date: 01/20/21    Disposition:     Home with VNA Services (Reminder: Complete face to face encounter)    Reason for Admission: Progressive dyspnea and confusion  Consultations During Hospital Stay:  · None  Procedures Performed:     · None  Primary diagnosis:    Acute respiratory failure secondary to COVID 19 viral pneumonia  Secondary diagnosis:   Toxic Encephalopathy  Significant Findings / Test Results:     · None  Incidental Findings:   · None  Test Results Pending at Discharge (will require follow up): · None  Outpatient Tests Requested:  · Repeat CXR in 6 weeks  Complications:  None  Hospital Course:     Minda Muse is a 69 y o  female patient who originally presented to the hospital on 1/9/2021 due to confusion and hypoxia  Patient was initially diagnosed with COVID-19 infection 12/30  Initially infection manifested with mild dyspnea however this progressed in severity, with the patient becoming more confused, and more dyspneic  Patient was noted for a medical history significant for, but not limited to, dementia, and a history of breast cancer on tamoxifen  Portable CXR on this admission showed mild patchy groundglass density in the right mid to lower lung zone   Patient was admitted to [place] [address] and managed with mild COVID-19 pathway, which included a 5 day course of remdesivir and 10 total days of dexamethasone  Patient, however, made slow improvements in respiratory status during her hospitalization; although no episodes of acute respiratory distress, patient required 3L/min of oxygen via nasal cannula to maintain adequate oxygen saturation at rest, and would desaturate on ambulation, with this status remaining after completion of COVID 19 treatment  Patient underwent a home oxygen evaluation which indicated she would need approximately 4L/min at rest and up to 6L/min with ambulation to maintain saturations  Patient underwent PT/OT evaluation, and the recommendation for SNF placement for rehab was discussed with family, however it was declined  On discharge, patient was provided a prescription for portable oxygen at 6L/min and a liaison with home health services was established via case management  No discharge VTE prophylaxis was provided, as patient had a calculated IMPROVE score of 2  Patient was advised to follow-up with PCP within one week, and obtain a chest x-ray in 6 weeks to evaluate for resolution of pulmonary infiltrates  Condition at Discharge: stable     Discharge Day Visit / Exam:     Subjective:  No acute issues reported  Vitals: Blood Pressure: 102/64 (01/20/21 1449)  Pulse: 70 (01/20/21 1449)  Temperature: 98 8 °F (37 1 °C) (01/20/21 1449)  Temp Source: Oral (01/20/21 0444)  Respirations: 18 (01/20/21 1449)  Height: 5' 3" (160 cm) (01/09/21 2353)  Weight - Scale: 84 5 kg (186 lb 4 6 oz) (01/20/21 0600)  SpO2: 90 % (01/20/21 1449)  Exam:   Physical Exam  Constitutional:       Appearance: Normal appearance  HENT:      Head: Normocephalic and atraumatic  Mouth/Throat:      Mouth: Mucous membranes are moist    Eyes:      Conjunctiva/sclera: Conjunctivae normal    Cardiovascular:      Rate and Rhythm: Normal rate and regular rhythm        Heart sounds: S1 normal and S2 normal    Pulmonary:      Effort: No tachypnea, accessory muscle usage, prolonged expiration or respiratory distress  Abdominal:      General: There is no distension  Musculoskeletal:      Right lower leg: No edema  Left lower leg: No edema  Skin:     General: Skin is dry  Neurological:      General: No focal deficit present  Mental Status: She is alert  Psychiatric:         Mood and Affect: Mood normal          Behavior: Behavior normal        Discussion with Family: None  Discharge instructions/Information to patient and family:   See after visit summary for information provided to patient and family  Provisions for Follow-Up Care:  See after visit summary for information related to follow-up care and any pertinent home health orders  Planned Readmission: None  Discharge Medications:  See after visit summary for reconciled discharge medications provided to patient and family        ** Please Note: This note has been constructed using a voice recognition system **

## 2021-01-20 NOTE — ASSESSMENT & PLAN NOTE
Reported to be periodically disoriented  AAOx3 on today's encounter     - Continue donepezil and memantine routine   - Continue olanzapine prn for agitation   - Redirect as needed

## 2021-01-20 NOTE — ASSESSMENT & PLAN NOTE
Lab Results   Component Value Date    HGBA1C 5 9 (H) 08/21/2020       Recent Labs     01/19/21  1634 01/19/21  2112 01/20/21  0612 01/20/21  1127   POCGLU 204* 211* 178* 104       Blood Sugar Average: Last 72 hrs:  (P) 187 [id number]     HbA1c within appropriate range  POC glucose slightly elevated since previous assessment  - Will d/c basal-bolus insulin at d/c   - Patient was not on oral glycemic agents prior   - F/u with PCP on d/c

## 2021-01-20 NOTE — ASSESSMENT & PLAN NOTE
At the time of discharge, patient appears to be in no respiratory distress and saturating at 90-95% on 3L/min, no significant change from yesterday  S/p COVID 19 mild treatment enriqueta  Received remdesivir 5/5 days and dexamethasone for 10 days  Oxygen saturations dropped to the 70s on 3L/min of oxygen while ambulating with PT two days prior, with a home oxygen eval conducted yesterday, which indicated that she would require up to 6L/min  Discussion about SNF placement was rejected by family  - Will d/c patient on 6L/min of portable oxygen  -  IMPROVE score of 2  D- Dimer of 0 41 places her in GROUP 3; no anticoagulation at d/c   - Will d/c with home health care  - Recommend follow-up with PCP within one week  - Repeat CXR in 6 weeks to monitor for resolution of COVID 19 pneumonic infiltrates

## 2021-01-20 NOTE — PLAN OF CARE
Problem: PHYSICAL THERAPY ADULT  Goal: Performs mobility at highest level of function for planned discharge setting  See evaluation for individualized goals  Description: Treatment/Interventions: Functional transfer training, LE strengthening/ROM, Endurance training, Therapeutic exercise, Patient/family training, Equipment eval/education, Bed mobility, Gait training, Spoke to nursing, Spoke to MD  Equipment Recommended: (continue with RW)       See flowsheet documentation for full assessment, interventions and recommendations  Outcome: Progressing  Note: Prognosis: Fair  Problem List: Decreased strength, Decreased endurance, Impaired balance, Decreased mobility, Decreased cognition, Decreased safety awareness  Assessment: Pt seen for PT treatment session this date with interventions consisting of Therapeutic exercise consisting of: AROM 10 reps B LE in sitting position and therapeutic activity consisting of training: supine<>sit transfers, sit<>stand transfers and short gait trial from recliner to EOB  Pt agreeable to PT treatment session upon arrival, pt found seated OOB in recliner, in no apparent distress and responsive  In comparison to previous session, pt with no improvements as evidenced by decreased gait tolerance d/t fatigue; pt able to continue with B LE exercises to tolerance without pain reported  Post session: pt returned back to recliner, chair alarm engaged, all needs in reach and RN notified of session findings/recommendations  Continue to recommend Home PT with family support vs STR at time of d/c in order to maximize pt's functional independence and safety w/ mobility  Pt continues to be functioning below baseline level, and remains limited 2* factors listed above  PT will continue to see pt while here in order to address the deficits listed above and provide interventions consistent w/ POC in effort to achieve STGs    Barriers to Discharge: Inaccessible home environment, Decreased caregiver support     PT Discharge Recommendation: Home with skilled therapy, Return to previous environment with social support(home PT)     PT - OK to Discharge: No    See flowsheet documentation for full assessment

## 2021-01-20 NOTE — PHYSICAL THERAPY NOTE
Physical Therapy Treatment Note       01/20/21 1015   PT Last Visit   PT Visit Date 01/20/21   Note Type   Note Type Treatment   Pain Assessment   Pain Assessment Tool 0-10   Pain Score   (pt did not provide numeric score)   Pain Location/Orientation Location: Buttocks   Restrictions/Precautions   Weight Bearing Precautions Per Order No   Other Precautions Contact/isolation; Airborne/isolation;Cognitive; Chair Alarm; Bed Alarm; Fall Risk;Hard of hearing;Limb alert  (+COVID-19 confirmed  3L O2 NC)   General   Chart Reviewed Yes   Response to Previous Treatment Patient with no complaints from previous session  Family/Caregiver Present No   Cognition   Overall Cognitive Status Impaired   Arousal/Participation Cooperative   Attention Attends with cues to redirect   Orientation Level Oriented to person;Oriented to place; Disoriented to time;Disoriented to situation   Memory Decreased recall of biographical information;Decreased short term memory;Decreased recall of recent events;Decreased recall of precautions   Following Commands Follows one step commands with increased time or repetition   Comments pt agreeable to PT session   Subjective   Subjective "I want to rest"   Bed Mobility   Sit to Supine 5  Supervision   Additional items Assist x 1;HOB elevated; Increased time required;Verbal cues   Additional Comments pt received OOB to recliner upon arrival   Transfers   Sit to Stand 5  Supervision   Additional items Assist x 1; Increased time required;Verbal cues;Armrests   Stand to Sit 5  Supervision   Additional items Assist x 1; Increased time required;Verbal cues;Armrests   Ambulation/Elevation   Gait pattern Decreased foot clearance; Short stride; Step to   Gait Assistance 5  Supervision   Additional items Assist x 1;Verbal cues   Assistive Device Rolling walker   Distance 5' from recliner to bed; PT encouraged to ambulate, pt refusing further distance at this time   Stair Management Assistance Not tested   Balance   Static Sitting Good   Dynamic Sitting Fair +   Static Standing Fair   Dynamic Standing Fair -   Ambulatory Fair -   Endurance Deficit   Endurance Deficit Yes   Endurance Deficit Description vc for PLB/breathing technique  SPO2 down to 87% on 3L O2 NC during ther ex; frequent seated rest breaks c PLB required, increased SPO2 to 90% on 3L O2 NC   Activity Tolerance   Activity Tolerance Patient limited by fatigue; Other (Comment)  (SOB/DUGAN  cognition)   Nurse Made Aware RN Carrie Baker verbalized pt appropriate to see, made aware of session outcome/recs   Exercises   Heelslides Sitting;10 reps;AROM; Bilateral   Hip Abduction Sitting;10 reps;AROM; Bilateral   Hip Adduction Sitting;10 reps;AROM; Bilateral   Knee AROM Long Arc Quad Sitting;10 reps;AROM; Bilateral   Ankle Pumps Sitting;10 reps;AROM; Bilateral   Assessment   Prognosis Fair   Problem List Decreased strength;Decreased endurance; Impaired balance;Decreased mobility; Decreased cognition;Decreased safety awareness   Assessment Pt seen for PT treatment session this date with interventions consisting of Therapeutic exercise consisting of: AROM 10 reps B LE in sitting position and therapeutic activity consisting of training: supine<>sit transfers, sit<>stand transfers and short gait trial from recliner to EOB  Pt agreeable to PT treatment session upon arrival, pt found seated OOB in recliner, in no apparent distress and responsive  In comparison to previous session, pt with no improvements as evidenced by decreased gait tolerance d/t fatigue; pt able to continue with B LE exercises to tolerance without pain reported  Post session: pt returned back to recliner, chair alarm engaged, all needs in reach and RN notified of session findings/recommendations  Continue to recommend Home PT with family support vs STR at time of d/c in order to maximize pt's functional independence and safety w/ mobility   Pt continues to be functioning below baseline level, and remains limited 2* factors listed above  PT will continue to see pt while here in order to address the deficits listed above and provide interventions consistent w/ POC in effort to achieve STGs  Barriers to Discharge Inaccessible home environment;Decreased caregiver support   Goals   Patient Goals to go BTB   STG Expiration Date 01/28/21   Short Term Goal #1 STGs remain appropriate   PT Treatment Day 2   Plan   Treatment/Interventions Functional transfer training;LE strengthening/ROM; Endurance training; Therapeutic exercise;Patient/family training;Equipment eval/education; Bed mobility;Gait training;Spoke to nursing   Progress Slow progress, cognitive deficits   PT Frequency   (3-5x/wk)   Recommendation   PT Discharge Recommendation Home with skilled therapy; Return to previous environment with social support  (home PT)   Equipment Recommended Walker  (RW)   PT - OK to Discharge No   Additional Comments if family able to provide 24/7 A/S, would recommend return home with HHPT services and family support; if unable, will need to consider MARGARETTE Negrete, PT    Time of PT treatment session: 7560-2760

## 2021-01-20 NOTE — CASE MANAGEMENT
Per [facility] patient is cleared for discharge  CM phoned and spoke to montrell who confirmed she will transport patient home  Patient qualifies for oxygen and referral was sent to [facility] for oxygen, walker and commode  Patient is given all above items from the consignment at the request of [facility] [facility]  [facility] confirmed they will service patient and contact Montrell to schedule appointments  Treatment team informed

## 2021-01-20 NOTE — CASE MANAGEMENT
CM phoned and spoke to Sagrario Chris to apologize for the stress she went through yesterday  This CM informed Mignon Lira she thought about how she would feel if her own mother was in the hospital for 11 days and could not see her and how bad she would feel  This CM offered the table facetime option to communicate with patient  Mignon Lira was very pleased with the ability to see patient  Mignon Lira provided e-mail address [email or web address]  [email or web address] spoke to both nurse supervisor and CC and provided the e-mail and phone number to iMgnon Lira to set up facetime with patient  Mignon Soraya is informed a staff member will call her when facetime is scheduled  Treatment team informed

## 2021-01-21 NOTE — PLAN OF CARE
Problem: Potential for Falls  Goal: Patient will remain free of falls  Description: INTERVENTIONS:  - Assess patient frequently for physical needs  -  Identify cognitive and physical deficits and behaviors that affect risk of falls    -  [place] fall precautions as indicated by assessment   - Educate patient/family on patient safety including physical limitations  - Instruct patient to call for assistance with activity based on assessment  - Modify environment to reduce risk of injury  - Consider OT/PT consult to assist with strengthening/mobility  Outcome: Completed     Problem: PAIN - ADULT  Goal: Verbalizes/displays adequate comfort level or baseline comfort level  Description: Interventions:  - Encourage patient to monitor pain and request assistance  - Assess pain using appropriate pain scale  - Administer analgesics based on type and severity of pain and evaluate response  - Implement non-pharmacological measures as appropriate and evaluate response  - Consider cultural and social influences on pain and pain management  - Notify physician/advanced practitioner if interventions unsuccessful or patient reports new pain  Outcome: Completed     Problem: INFECTION - ADULT  Goal: Absence or prevention of progression during hospitalization  Description: INTERVENTIONS:  - Assess and monitor for signs and symptoms of infection  - Monitor lab/diagnostic results  - Monitor all insertion sites, i e  indwelling lines, tubes, and drains  - Monitor endotracheal if appropriate and nasal secretions for changes in amount and color  - [place] appropriate cooling/warming therapies per order  - Administer medications as ordered  - Instruct and encourage patient and family to use good hand hygiene technique  - Identify and instruct in appropriate isolation precautions for identified infection/condition  Outcome: Completed  Goal: Absence of fever/infection during neutropenic period  Description: INTERVENTIONS:  - Monitor WBC    Outcome: Completed     Problem: RESPIRATORY - ADULT  Goal: Achieves optimal ventilation and oxygenation  Description: INTERVENTIONS:  - Assess for changes in respiratory status  - Assess for changes in mentation and behavior  - Position to facilitate oxygenation and minimize respiratory effort  - Oxygen administered by appropriate delivery if ordered  - Initiate smoking cessation education as indicated  - Encourage broncho-pulmonary hygiene including cough, deep breathe, Incentive Spirometry  - Assess the need for suctioning and aspirate as needed  - Assess and instruct to report SOB or any respiratory difficulty  - Respiratory Therapy support as indicated  Outcome: Completed     Problem: METABOLIC, FLUID AND ELECTROLYTES - ADULT  Goal: Electrolytes maintained within normal limits  Description: INTERVENTIONS:  - Monitor labs and assess patient for signs and symptoms of electrolyte imbalances  - Administer electrolyte replacement as ordered  - Monitor response to electrolyte replacements, including repeat lab results as appropriate  - Instruct patient on fluid and nutrition as appropriate  Outcome: Completed  Goal: Fluid balance maintained  Description: INTERVENTIONS:  - Monitor labs   - Monitor I/O and WT  - Instruct patient on fluid and nutrition as appropriate  - Assess for signs & symptoms of volume excess or deficit  Outcome: Completed     Problem: HEMATOLOGIC - ADULT  Goal: Maintains hematologic stability  Description: INTERVENTIONS  - Assess for signs and symptoms of bleeding or hemorrhage  - Monitor labs  - Administer supportive blood products/factors as ordered and appropriate  Outcome: Completed     Problem: Nutrition/Hydration-ADULT  Goal: Nutrient/Hydration intake appropriate for improving, restoring or maintaining nutritional needs  Description: Monitor and assess patient's nutrition/hydration status for malnutrition   Collaborate with interdisciplinary team and initiate plan and interventions as ordered  Monitor patient's weight and dietary intake as ordered or per policy  Utilize nutrition screening tool and intervene as necessary  Determine patient's food preferences and provide high-protein, high-caloric foods as appropriate  INTERVENTIONS:  - Monitor oral intake, urinary output, labs, and treatment plans  - Assess nutrition and hydration status and recommend course of action  - Evaluate amount of meals eaten  - Assist patient with eating if necessary   - Allow adequate time for meals  - Recommend/ encourage appropriate diets, oral nutritional supplements, and vitamin/mineral supplements  - Order, calculate, and assess calorie counts as needed  - Assess need for intravenous fluids  - Provide nutrition/hydration education as appropriate  - Include patient/family/caregiver in decisions related to nutrition  Outcome: Completed     Problem: Prexisting or High Potential for Compromised Skin Integrity  Goal: Skin integrity is maintained or improved  Description: INTERVENTIONS:  - Identify patients at risk for skin breakdown  - Assess and monitor skin integrity  - Assess and monitor nutrition and hydration status  - Monitor labs   - Assess for incontinence   - Turn and reposition patient  - Assist with mobility/ambulation  - Relieve pressure over bony prominences  - Avoid friction and shearing  - Provide appropriate hygiene as needed including keeping skin clean and dry  - Evaluate need for skin moisturizer/barrier cream  - Collaborate with interdisciplinary team   - Patient/family teaching  - Consider wound care consult   Outcome: Completed     Problem: PHYSICAL THERAPY ADULT  Goal: Performs mobility at highest level of function for planned discharge setting  See evaluation for individualized goals    Description: Treatment/Interventions: Functional transfer training, LE strengthening/ROM, Endurance training, Therapeutic exercise, Patient/family training, Equipment eval/education, Bed mobility, Gait training, Spoke to nursing, Spoke to MD  Equipment Recommended: (continue with RW)       See flowsheet documentation for full assessment, interventions and recommendations  Outcome: Completed     Problem: OCCUPATIONAL THERAPY ADULT  Goal: Performs self-care activities at highest level of function for planned discharge setting  See evaluation for individualized goals  Description: Treatment Interventions: ADL retraining, Functional transfer training, UE strengthening/ROM, Endurance training, Patient/family training, Cognitive reorientation, Energy conservation, Activityengagement, Compensatory technique education          See flowsheet documentation for full assessment, interventions and recommendations  Outcome: Completed     Problem: Potential for Falls  Goal: Patient will remain free of falls  Description: INTERVENTIONS:  - Assess patient frequently for physical needs  -  Identify cognitive and physical deficits and behaviors that affect risk of falls    -  [place] fall precautions as indicated by assessment   - Educate patient/family on patient safety including physical limitations  - Instruct patient to call for assistance with activity based on assessment  - Modify environment to reduce risk of injury  - Consider OT/PT consult to assist with strengthening/mobility  Outcome: Completed     Problem: PAIN - ADULT  Goal: Verbalizes/displays adequate comfort level or baseline comfort level  Description: Interventions:  - Encourage patient to monitor pain and request assistance  - Assess pain using appropriate pain scale  - Administer analgesics based on type and severity of pain and evaluate response  - Implement non-pharmacological measures as appropriate and evaluate response  - Consider cultural and social influences on pain and pain management  - Notify physician/advanced practitioner if interventions unsuccessful or patient reports new pain  Outcome: Completed     Problem: INFECTION - ADULT  Goal: Absence or prevention of progression during hospitalization  Description: INTERVENTIONS:  - Assess and monitor for signs and symptoms of infection  - Monitor lab/diagnostic results  - Monitor all insertion sites, i e  indwelling lines, tubes, and drains  - Monitor endotracheal if appropriate and nasal secretions for changes in amount and color  - [place] appropriate cooling/warming therapies per order  - Administer medications as ordered  - Instruct and encourage patient and family to use good hand hygiene technique  - Identify and instruct in appropriate isolation precautions for identified infection/condition  Outcome: Completed  Goal: Absence of fever/infection during neutropenic period  Description: INTERVENTIONS:  - Monitor WBC    Outcome: Completed     Problem: RESPIRATORY - ADULT  Goal: Achieves optimal ventilation and oxygenation  Description: INTERVENTIONS:  - Assess for changes in respiratory status  - Assess for changes in mentation and behavior  - Position to facilitate oxygenation and minimize respiratory effort  - Oxygen administered by appropriate delivery if ordered  - Initiate smoking cessation education as indicated  - Encourage broncho-pulmonary hygiene including cough, deep breathe, Incentive Spirometry  - Assess the need for suctioning and aspirate as needed  - Assess and instruct to report SOB or any respiratory difficulty  - Respiratory Therapy support as indicated  Outcome: Completed     Problem: METABOLIC, FLUID AND ELECTROLYTES - ADULT  Goal: Electrolytes maintained within normal limits  Description: INTERVENTIONS:  - Monitor labs and assess patient for signs and symptoms of electrolyte imbalances  - Administer electrolyte replacement as ordered  - Monitor response to electrolyte replacements, including repeat lab results as appropriate  - Instruct patient on fluid and nutrition as appropriate  Outcome: Completed  Goal: Fluid balance maintained  Description: INTERVENTIONS:  - Monitor labs   - Monitor I/O and WT  - Instruct patient on fluid and nutrition as appropriate  - Assess for signs & symptoms of volume excess or deficit  Outcome: Completed     Problem: HEMATOLOGIC - ADULT  Goal: Maintains hematologic stability  Description: INTERVENTIONS  - Assess for signs and symptoms of bleeding or hemorrhage  - Monitor labs  - Administer supportive blood products/factors as ordered and appropriate  Outcome: Completed     Problem: Nutrition/Hydration-ADULT  Goal: Nutrient/Hydration intake appropriate for improving, restoring or maintaining nutritional needs  Description: Monitor and assess patient's nutrition/hydration status for malnutrition  Collaborate with interdisciplinary team and initiate plan and interventions as ordered  Monitor patient's weight and dietary intake as ordered or per policy  Utilize nutrition screening tool and intervene as necessary  Determine patient's food preferences and provide high-protein, high-caloric foods as appropriate       INTERVENTIONS:  - Monitor oral intake, urinary output, labs, and treatment plans  - Assess nutrition and hydration status and recommend course of action  - Evaluate amount of meals eaten  - Assist patient with eating if necessary   - Allow adequate time for meals  - Recommend/ encourage appropriate diets, oral nutritional supplements, and vitamin/mineral supplements  - Order, calculate, and assess calorie counts as needed  - Assess need for intravenous fluids  - Provide nutrition/hydration education as appropriate  - Include patient/family/caregiver in decisions related to nutrition  Outcome: Completed     Problem: Prexisting or High Potential for Compromised Skin Integrity  Goal: Skin integrity is maintained or improved  Description: INTERVENTIONS:  - Identify patients at risk for skin breakdown  - Assess and monitor skin integrity  - Assess and monitor nutrition and hydration status  - Monitor labs   - Assess for incontinence   - Turn and reposition patient  - Assist with mobility/ambulation  - Relieve pressure over bony prominences  - Avoid friction and shearing  - Provide appropriate hygiene as needed including keeping skin clean and dry  - Evaluate need for skin moisturizer/barrier cream  - Collaborate with interdisciplinary team   - Patient/family teaching  - Consider wound care consult   Outcome: Completed     Problem: PHYSICAL THERAPY ADULT  Goal: Performs mobility at highest level of function for planned discharge setting  See evaluation for individualized goals  Description: Treatment/Interventions: Functional transfer training, LE strengthening/ROM, Endurance training, Therapeutic exercise, Patient/family training, Equipment eval/education, Bed mobility, Gait training, Spoke to nursing, Spoke to MD  Equipment Recommended: (continue with RW)       See flowsheet documentation for full assessment, interventions and recommendations  Outcome: Completed     Problem: OCCUPATIONAL THERAPY ADULT  Goal: Performs self-care activities at highest level of function for planned discharge setting  See evaluation for individualized goals  Description: Treatment Interventions: ADL retraining, Functional transfer training, UE strengthening/ROM, Endurance training, Patient/family training, Cognitive reorientation, Energy conservation, Activityengagement, Compensatory technique education          See flowsheet documentation for full assessment, interventions and recommendations     Outcome: Completed

## 2021-03-01 ENCOUNTER — HOSPITAL ENCOUNTER (EMERGENCY)
Facility: HOSPITAL | Age: 71
Discharge: HOME | End: 2021-03-01
Attending: EMERGENCY MEDICINE | Admitting: EMERGENCY MEDICINE
Payer: MEDICARE

## 2021-03-01 ENCOUNTER — APPOINTMENT (EMERGENCY)
Dept: RADIOLOGY | Facility: HOSPITAL | Age: 71
End: 2021-03-01
Payer: MEDICARE

## 2021-03-01 ENCOUNTER — APPOINTMENT (EMERGENCY)
Dept: CT IMAGING | Facility: HOSPITAL | Age: 71
End: 2021-03-01
Payer: MEDICARE

## 2021-03-01 VITALS
BODY MASS INDEX: 33.19 KG/M2 | RESPIRATION RATE: 18 BRPM | OXYGEN SATURATION: 96 % | DIASTOLIC BLOOD PRESSURE: 72 MMHG | HEART RATE: 81 BPM | TEMPERATURE: 98.3 F | WEIGHT: 187.39 LBS | SYSTOLIC BLOOD PRESSURE: 155 MMHG

## 2021-03-01 DIAGNOSIS — M25.552 ACUTE HIP PAIN, LEFT: ICD-10-CM
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
## 2021-03-01 PROCEDURE — 99284 EMERGENCY DEPT VISIT MOD MDM: CPT

## 2021-03-01 PROCEDURE — 93005 ELECTROCARDIOGRAM TRACING: CPT

## 2021-03-01 PROCEDURE — 99285 EMERGENCY DEPT VISIT HI MDM: CPT | Performed by: EMERGENCY MEDICINE

## 2021-03-01 PROCEDURE — G1004 CDSM NDSC: HCPCS

## 2021-03-01 PROCEDURE — 73502 X-RAY EXAM HIP UNI 2-3 VIEWS: CPT

## 2021-03-01 PROCEDURE — 70450 CT HEAD/BRAIN W/O DYE: CPT

## 2021-03-01 NOTE — ED NOTES
Patient was complaining she was having pain walking to the restroom with asset and a walker       Mary Ann Frazier  03/01/21 [id number]

## 2021-03-01 NOTE — ED PROVIDER NOTES
Emergency Department Trauma Note  Jerica Aranda 69 y o  female MRN: [id number]  Unit/Bed#: [facility] [facility] Encounter: [id number]      Trauma Alert: Trauma Acuity: C  Model of Arrival: Mode of Arrival: ALS via    Trauma Team: Current Providers  Attending Provider: Ajith Epperson DO  Charge Nurse: Abdulaziz Ac RN  Registered Nurse: Aislinn Velez RN  Consultants: None      History of Present Illness     Chief Complaint:   Chief Complaint   Patient presents with   Marta Clonts     from home via EMS  PT fell at home on left hip  PT takes ASA     HPI:  Jerica Aranda is a 69 y o  female who presents with pain in the left hip  The patient was noted to have a fall while on the home monitoring system  She fell onto left hip and is reporting left hip pain  No reported head strike or loss of consciousness  Patient has dementia and is unable to provide much history  EMS report that the patient fell shortly prior to their arrival and did not have a prolonged down time  Patient is pleasant, confused  She does recall having a fall but does not recall what occurred prior to falling    Mechanism:Details of Incident: fell on left hip Injury Date: 03/01/21          History provided by:  Patient, medical records and EMS personnel  History limited by:  Dementia   used: No    Fall  Mechanism of injury: fall    Injury location:  Pelvis  Pelvic injury location:  L hip  Incident location:  Home  Arrived directly from scene: yes    Fall:     Fall occurred:  Tripped and walking    Impact surface:  Hard floor    Point of impact:  Unable to specify  Suspicion of alcohol use: no    Suspicion of drug use: no    Prior to arrival data:     Loss of consciousness: no    Associated symptoms: no back pain, no headaches, no nausea, no seizures and no vomiting    Risk factors: no anticoagulation therapy    Risk factors comment:  ASA    Review of Systems   Unable to perform ROS: Dementia   Gastrointestinal: Negative for nausea and vomiting  Musculoskeletal: Negative for back pain  Neurological: Negative for seizures and headaches  unable to obtain due to dementia    Historical Information     Immunizations: There is no immunization history on file for this patient  Past Medical History:   Diagnosis Date    Arthritis     [facility] cancer [facility] [address] [facility]     Cancer ([facility] )     breast    Coronary artery disease     Diabetes mellitus ([facility] )     GERD (gastroesophageal reflux disease)     History of transfusion     Hyperlipidemia     Hypertension     Hypothyroidism     Memory difficulties     Psychiatric disorder     Shortness of breath      History reviewed  No pertinent family history  Past Surgical History:   Procedure Laterality Date    APPENDECTOMY      BREAST SURGERY       SECTION      HYSTERECTOMY      MASTECTOMY Left     WI ESOPHAGOGASTRODUODENOSCOPY TRANSORAL DIAGNOSTIC N/A 5/10/2017    Procedure: ESOPHAGOGASTRODUODENOSCOPY (EGD); Surgeon: Freddie Guy MD;  Location: [facility]; Service: Gastroenterology    TONSILLECTOMY       Social History     Tobacco Use    Smoking status: Never Smoker    Smokeless tobacco: Never Used   Substance Use Topics    Alcohol use: Never     Frequency: Never    Drug use: Never     E-Cigarette/Vaping    E-Cigarette Use Never User      E-Cigarette/Vaping Substances       Family History: non-contributory    Meds/Allergies   Prior to Admission Medications   Prescriptions Last Dose Informant Patient Reported? Taking?    Cholecalciferol (VITAMIN D3) 65847 units CAPS   Yes No   Sig: Take by mouth   DULoxetine (CYMBALTA) 60 mg delayed release capsule   Yes No   Sig: Take 60 mg by mouth daily   Levothyroxine Sodium 137 MCG CAPS   Yes No   Sig: Take by mouth   aspirin (ECOTRIN LOW STRENGTH) 81 mg EC tablet   Yes No   Sig: Take 81 mg by mouth daily   diltiazem (DILACOR XR) 240 MG 24 hr capsule   Yes No   Sig: Take 240 mg by mouth daily   donepezil (ARICEPT) 10 mg tablet   Yes No Sig: Take 10 mg by mouth daily at bedtime   losartan 50 mg TABS 100 mg, hydrochlorothiazide 12 5 mg TABS 12 5 mg   Yes No   Sig: Take by mouth daily   meclizine (ANTIVERT) 25 mg tablet   No No   Sig: Take 1 tablet (25 mg total) by mouth 3 (three) times a day as needed for dizziness   memantine (NAMENDA) 10 mg tablet   Yes No   Sig: Take 10 mg by mouth 2 (two) times a day   multivitamin (THERAGRAN) TABS   Yes No   Sig: Take 1 tablet by mouth daily   multivitamin-minerals (CENTRUM ADULTS) tablet   No No   Sig: Take 1 tablet by mouth daily   nabumetone (RELAFEN) 750 mg tablet   Yes No   Sig: Take 750 mg by mouth 2 (two) times a day   simvastatin (ZOCOR) 20 mg tablet   Yes No   Sig: Take 20 mg by mouth daily at bedtime   tamoxifen (NOLVADEX) 20 mg tablet   Yes No   Sig: Take 20 mg by mouth 2 (two) times a day      Facility-Administered Medications: None       Allergies   Allergen Reactions    Erythromycin GI Intolerance       PHYSICAL EXAM    PE limited by: N/A    Objective   Vitals:   First set: Temperature: 98 3 °F (36 8 °C) (03/01/21 1622)  Pulse: 78 (03/01/21 1622)  Respirations: 20 (03/01/21 1622)  Blood Pressure: 170/79 (03/01/21 1622)  SpO2: 96 % (03/01/21 1622)    Primary Survey:   (A) Airway:  Intact  (B) Breathing:  Equal bilateral breath sounds  (C) Circulation: Pulses:   normal  (D) Disabliity:  GCS Total:  15  (E) Expose:  Completed    Secondary Survey: (Click on Physical Exam tab above)  Physical Exam  Vitals signs and nursing note reviewed  Constitutional:       General: She is not in acute distress  Appearance: Normal appearance  She is well-developed  She is not ill-appearing or toxic-appearing  HENT:      Head: Normocephalic and atraumatic  Jaw: There is normal jaw occlusion  Nose: Nose normal       Mouth/Throat:      Pharynx: No oropharyngeal exudate  Eyes:      Conjunctiva/sclera: Conjunctivae normal       Pupils: Pupils are equal, round, and reactive to light     Neck: Musculoskeletal: Normal range of motion and neck supple  Cardiovascular:      Rate and Rhythm: Normal rate and regular rhythm  Heart sounds: Normal heart sounds  Pulmonary:      Effort: Pulmonary effort is normal       Breath sounds: Normal breath sounds  Abdominal:      General: Bowel sounds are normal  There is no distension  Palpations: Abdomen is soft  Tenderness: There is no abdominal tenderness  There is no guarding or rebound  Musculoskeletal: Normal range of motion  General: No deformity  Left hip: She exhibits tenderness and bony tenderness  Legs:    Lymphadenopathy:      Cervical: No cervical adenopathy  Skin:     General: Skin is warm and dry  Findings: No rash  Neurological:      General: No focal deficit present  Mental Status: She is alert and oriented to person, place, and time  Cranial Nerves: No cranial nerve deficit  Sensory: No sensory deficit  Motor: No abnormal muscle tone  Coordination: Coordination normal       Gait: Gait normal       Deep Tendon Reflexes: Reflexes are normal and symmetric  Psychiatric:         Attention and Perception: Attention normal          Mood and Affect: Mood normal          Speech: Speech normal          Behavior: Behavior normal  Behavior is cooperative  Cognition and Memory: Cognition is impaired  Memory is impaired  She exhibits impaired recent memory and impaired remote memory  Judgment: Judgment normal          Cervical spine cleared by clinical criteria? Yes     Invasive Devices     Drain            External Urinary Catheter 42 days                Lab Results:   Results Reviewed     None                 Imaging Studies:   Direct to CT: No  XR hip/pelv 2-3 vws left   Final Result by Daily Gleason DO (03/01 1654)      Mild degenerative change with no acute osseous abnormality              Workstation performed: [id number]         CT head without contrast   Final Result by Kiesha Mgiuel MD (03/01 7258)      No acute intracranial abnormality  The study was marked in [facility] for immediate notification  Workstation performed: [id number]               Procedures  ECG 12 Lead Documentation Only    Date/Time: 3/1/2021 4:56 PM  Performed by: Charlotte Montano DO  Authorized by: Charlotte Montano DO     Indications / Diagnosis:  Trauma level C  ECG reviewed by me, the ED Provider: yes    Patient location:  ED  Previous ECG:     Previous ECG:  Compared to current    Comparison ECG info:  9/19    Similarity:  No change  Interpretation:     Interpretation: abnormal    Quality:     Tracing quality:  Limited by artifact  Rate:     ECG rate:  73    ECG rate assessment: normal    Rhythm:     Rhythm: sinus rhythm    Ectopy:     Ectopy: none    QRS:     QRS axis:  Left  Conduction:     Conduction: normal    ST segments:     ST segments:  Normal  T waves:     T waves: normal               ED Course  ED Course as of Mar 01 1830   Mon Mar 01, 2021   1636 I contacted the patient's daughter Luiz Prasad  She states that they saw the patient fall on monitor  They noted that she was unsteady and seemed off balance, she then fell onto the left hip near a bookshelf  Patient's daughter said that they are unable to see on the video if she did strike her head on the book case but she did not complain of any pain in the head at that time  She reports the patient does have Alzheimer's dementia and tends to get agitated, she also reports that they kept her hearing aids at home and she may have difficulty hearing  1701 Patient's head CT negative for acute pathology  Hip demonstrates degenerative changes but no fracture  Patient is ambulating to bathroom  Will contact her daughter Luiz Prasad to provide transportation home                MDM  Number of Diagnoses or Management Options  Acute hip pain, left: new and requires workup  Fall, initial encounter: new and requires workup     Amount and/or Complexity of Data Reviewed  Tests in the radiology section of CPT®: ordered and reviewed  Decide to obtain previous medical records or to obtain history from someone other than the patient: yes  Independent visualization of images, tracings, or specimens: yes    Risk of Complications, Morbidity, and/or Mortality  General comments: PATIENT AMBULATES TO THE BATHROOM WITHOUT DIFFICULTY  PATIENT'S DAUGHTER CONTACTED AND WILL TRANSPORT THE PATIENT HOME  DISCUSSED SIGNS AND SYMPTOMS RETURN TO THE EMERGENCY DEPARTMENT    Patient Progress  Patient progress: stable          Disposition  [facility] Transfer: No  Final diagnoses:   Fall, initial encounter   Acute hip pain, left     Time reflects when diagnosis was documented in both MDM as applicable and the Disposition within this note     Time User Action Codes Description Comment    3/1/2021  5:03 PM Fariha Adrian Add [W19  XXXA] Fall, initial encounter     3/1/2021  5:04 PM Fariha Adrian Add [M25 [id number]] Acute hip pain, left       ED Disposition     ED Disposition Condition Date/Time Comment    Discharge Stable Mon Mar 1, 4685  8:65 PM Champion Tiarra discharge to home/self care  Follow-up Information     Follow up With Specialties Details Why Contact Info    Danis Cerda MD Internal Medicine Schedule an appointment as soon as possible for a visit in 2 days As needed [address] [facility]  [address]  [address] [place] [address]  [phone]          Discharge Medication List as of 3/1/2021  5:05 PM      CONTINUE these medications which have NOT CHANGED    Details   aspirin (ECOTRIN LOW STRENGTH) 81 mg EC tablet Take 81 mg by mouth daily, Until Discontinued, Historical Med      Cholecalciferol (VITAMIN D3) 03902 units CAPS Take by mouth, Until Discontinued, Historical Med      diltiazem (DILACOR XR) 240 MG 24 hr capsule Take 240 mg by mouth daily, Until Discontinued, Historical Med      donepezil (ARICEPT) 10 mg tablet Take 10 mg by mouth daily at bedtime, Until Discontinued, Historical Med      DULoxetine (CYMBALTA) 60 mg delayed release capsule Take 60 mg by mouth daily, Until Discontinued, Historical Med      Levothyroxine Sodium 137 MCG CAPS Take by mouth, Until Discontinued, Historical Med      losartan 50 mg TABS 100 mg, hydrochlorothiazide 12 5 mg TABS 12 5 mg Take by mouth daily, Until Discontinued, Historical Med      meclizine (ANTIVERT) 25 mg tablet Take 1 tablet (25 mg total) by mouth 3 (three) times a day as needed for dizziness, Starting Mon 9/30/2019, Normal      memantine (NAMENDA) 10 mg tablet Take 10 mg by mouth 2 (two) times a day, Until Discontinued, Historical Med      multivitamin (THERAGRAN) TABS Take 1 tablet by mouth daily, Until Discontinued, Historical Med      multivitamin-minerals (CENTRUM ADULTS) tablet Take 1 tablet by mouth daily, Starting Thu 1/21/2021, Normal      nabumetone (RELAFEN) 750 mg tablet Take 750 mg by mouth 2 (two) times a day, Until Discontinued, Historical Med      simvastatin (ZOCOR) 20 mg tablet Take 20 mg by mouth daily at bedtime, Until Discontinued, Historical Med      tamoxifen (NOLVADEX) 20 mg tablet Take 20 mg by mouth 2 (two) times a day, Until Discontinued, Historical Med           No discharge procedures on file      PDMP Review       Value Time User    PDMP Reviewed  Yes 1/20/2021  3:51 PM Cristina Adkins MD          ED Provider  Electronically Signed by         Marisol Padilla DO  03/01/21 8399

## 2021-03-02 LAB
ATRIAL RATE: 73 BPM
P AXIS: 40 DEGREES
PR INTERVAL: 118 MS
QRS AXIS: -32 DEGREES
QRSD INTERVAL: 98 MS
QT INTERVAL: 436 MS
QTC INTERVAL: 480 MS
T WAVE AXIS: 43 DEGREES
VENTRICULAR RATE: 73 BPM

## 2021-03-02 PROCEDURE — 93010 ELECTROCARDIOGRAM REPORT: CPT | Performed by: INTERNAL MEDICINE

## 2021-03-30 DIAGNOSIS — Z23 ENCOUNTER FOR IMMUNIZATION: ICD-10-CM

## 2021-04-08 ENCOUNTER — IMMUNIZATIONS (OUTPATIENT)
Dept: FAMILY MEDICINE CLINIC | Facility: HOSPITAL | Age: 71
End: 2021-04-08

## 2021-04-08 DIAGNOSIS — Z23 ENCOUNTER FOR IMMUNIZATION: Primary | ICD-10-CM

## 2021-04-08 PROCEDURE — 91300 SARS-COV-2 / COVID-19 MRNA VACCINE (PFIZER-BIONTECH) 30 MCG: CPT

## 2021-04-08 PROCEDURE — 0001A SARS-COV-2 / COVID-19 MRNA VACCINE (PFIZER-BIONTECH) 30 MCG: CPT

## 2021-04-30 ENCOUNTER — IMMUNIZATIONS (OUTPATIENT)
Dept: FAMILY MEDICINE CLINIC | Facility: HOSPITAL | Age: 71
End: 2021-04-30

## 2021-04-30 DIAGNOSIS — Z23 ENCOUNTER FOR IMMUNIZATION: Primary | ICD-10-CM

## 2021-04-30 PROCEDURE — 91300 SARS-COV-2 / COVID-19 MRNA VACCINE (PFIZER-BIONTECH) 30 MCG: CPT

## 2021-04-30 PROCEDURE — 0002A SARS-COV-2 / COVID-19 MRNA VACCINE (PFIZER-BIONTECH) 30 MCG: CPT

## 2022-03-07 NOTE — ASSESSMENT & PLAN NOTE
Continue home medications Aricept and Namenda  Earlier patient had episode of agitated behavior requiring restrain and close one-to-one observation  Currently awake, alert, oriented self, place, time  Intermittent forgetfulness requiring reorientation  Discontinue restrain and close observation for now  Encouraged good sleep hygiene  Admission

## 2023-01-26 NOTE — ASSESSMENT & PLAN NOTE
Patient presented with worsening disorientation  Likely secondary to toxic encephalopathy superimposed on baseline dementia  Periodically confused but currently patient is AAOx3  - Acute encephalopathy resolved  Baseline dementia remains  No

## 2023-05-18 LAB — HBA1C MFR BLD HPLC: 6.5 %
